# Patient Record
Sex: MALE | Race: WHITE | NOT HISPANIC OR LATINO | Employment: OTHER | URBAN - METROPOLITAN AREA
[De-identification: names, ages, dates, MRNs, and addresses within clinical notes are randomized per-mention and may not be internally consistent; named-entity substitution may affect disease eponyms.]

---

## 2022-09-23 ENCOUNTER — HOSPITAL ENCOUNTER (OUTPATIENT)
Facility: HOSPITAL | Age: 72
Discharge: HOME | End: 2022-09-27
Attending: EMERGENCY MEDICINE | Admitting: INTERNAL MEDICINE
Payer: MEDICARE

## 2022-09-23 DIAGNOSIS — R07.9 CHEST PAIN: ICD-10-CM

## 2022-09-23 DIAGNOSIS — D61.818 PANCYTOPENIA: ICD-10-CM

## 2022-09-23 DIAGNOSIS — R06.02 SOB (SHORTNESS OF BREATH): Primary | ICD-10-CM

## 2022-09-23 DIAGNOSIS — R59.9 ENLARGEMENT OF LYMPH NODE: ICD-10-CM

## 2022-09-23 DIAGNOSIS — D64.9 ANEMIA, UNSPECIFIED TYPE: ICD-10-CM

## 2022-09-23 DIAGNOSIS — R60.9 1+ PITTING EDEMA: ICD-10-CM

## 2022-09-23 DIAGNOSIS — R06.02 SHORTNESS OF BREATH: ICD-10-CM

## 2022-09-23 LAB
ALBUMIN SERPL BCP-MCNC: 2.6 G/DL (ref 3.5–5.2)
ALP SERPL-CCNC: 141 U/L (ref 55–135)
ALT SERPL W/O P-5'-P-CCNC: 56 U/L (ref 10–44)
ANION GAP SERPL CALC-SCNC: 9 MMOL/L (ref 8–16)
ANISOCYTOSIS BLD QL SMEAR: SLIGHT
AST SERPL-CCNC: 43 U/L (ref 10–40)
BASOPHILS # BLD AUTO: 0 K/UL (ref 0–0.2)
BASOPHILS NFR BLD: 0 % (ref 0–1.9)
BILIRUB SERPL-MCNC: 0.8 MG/DL (ref 0.1–1)
BNP SERPL-MCNC: 161 PG/ML (ref 0–99)
BUN SERPL-MCNC: 21 MG/DL (ref 8–23)
CALCIUM SERPL-MCNC: 9.1 MG/DL (ref 8.7–10.5)
CHLORIDE SERPL-SCNC: 100 MMOL/L (ref 95–110)
CO2 SERPL-SCNC: 21 MMOL/L (ref 23–29)
CREAT SERPL-MCNC: 1.1 MG/DL (ref 0.5–1.4)
D DIMER PPP IA.FEU-MCNC: 9.38 MG/L FEU
DIFFERENTIAL METHOD: ABNORMAL
EOSINOPHIL # BLD AUTO: 0 K/UL (ref 0–0.5)
EOSINOPHIL NFR BLD: 0.3 % (ref 0–8)
ERYTHROCYTE [DISTWIDTH] IN BLOOD BY AUTOMATED COUNT: 16.1 % (ref 11.5–14.5)
EST. GFR  (NO RACE VARIABLE): >60 ML/MIN/1.73 M^2
GLUCOSE SERPL-MCNC: 95 MG/DL (ref 70–110)
HCT VFR BLD AUTO: 21.1 % (ref 40–54)
HGB BLD-MCNC: 7.2 G/DL (ref 14–18)
IMM GRANULOCYTES # BLD AUTO: 0.02 K/UL (ref 0–0.04)
IMM GRANULOCYTES NFR BLD AUTO: 0.5 % (ref 0–0.5)
INFLUENZA A, MOLECULAR: NEGATIVE
INFLUENZA B, MOLECULAR: NEGATIVE
LYMPHOCYTES # BLD AUTO: 0.7 K/UL (ref 1–4.8)
LYMPHOCYTES NFR BLD: 16.6 % (ref 18–48)
MCH RBC QN AUTO: 29 PG (ref 27–31)
MCHC RBC AUTO-ENTMCNC: 34.1 G/DL (ref 32–36)
MCV RBC AUTO: 85 FL (ref 82–98)
MONOCYTES # BLD AUTO: 0.2 K/UL (ref 0.3–1)
MONOCYTES NFR BLD: 6.1 % (ref 4–15)
NEUTROPHILS # BLD AUTO: 3 K/UL (ref 1.8–7.7)
NEUTROPHILS NFR BLD: 76.5 % (ref 38–73)
NRBC BLD-RTO: 0 /100 WBC
PLATELET # BLD AUTO: 100 K/UL (ref 150–450)
PLATELET BLD QL SMEAR: ABNORMAL
PMV BLD AUTO: 12.4 FL (ref 9.2–12.9)
POTASSIUM SERPL-SCNC: 3.9 MMOL/L (ref 3.5–5.1)
PROT SERPL-MCNC: 7.6 G/DL (ref 6–8.4)
RBC # BLD AUTO: 2.48 M/UL (ref 4.6–6.2)
SARS-COV-2 RDRP RESP QL NAA+PROBE: NEGATIVE
SODIUM SERPL-SCNC: 130 MMOL/L (ref 136–145)
SPECIMEN SOURCE: NORMAL
TROPONIN I SERPL DL<=0.01 NG/ML-MCNC: 0.04 NG/ML (ref 0–0.03)
WBC # BLD AUTO: 3.91 K/UL (ref 3.9–12.7)

## 2022-09-23 PROCEDURE — 93010 EKG 12-LEAD: ICD-10-PCS | Mod: 76,,, | Performed by: INTERNAL MEDICINE

## 2022-09-23 PROCEDURE — 85379 FIBRIN DEGRADATION QUANT: CPT | Performed by: EMERGENCY MEDICINE

## 2022-09-23 PROCEDURE — 93010 ELECTROCARDIOGRAM REPORT: CPT | Mod: ,,, | Performed by: INTERNAL MEDICINE

## 2022-09-23 PROCEDURE — 25000003 PHARM REV CODE 250: Performed by: NURSE PRACTITIONER

## 2022-09-23 PROCEDURE — 96374 THER/PROPH/DIAG INJ IV PUSH: CPT

## 2022-09-23 PROCEDURE — 83880 ASSAY OF NATRIURETIC PEPTIDE: CPT | Performed by: EMERGENCY MEDICINE

## 2022-09-23 PROCEDURE — 99285 EMERGENCY DEPT VISIT HI MDM: CPT | Mod: 25

## 2022-09-23 PROCEDURE — 84484 ASSAY OF TROPONIN QUANT: CPT | Performed by: EMERGENCY MEDICINE

## 2022-09-23 PROCEDURE — 96372 THER/PROPH/DIAG INJ SC/IM: CPT | Performed by: NURSE PRACTITIONER

## 2022-09-23 PROCEDURE — 99900035 HC TECH TIME PER 15 MIN (STAT)

## 2022-09-23 PROCEDURE — 93005 ELECTROCARDIOGRAM TRACING: CPT

## 2022-09-23 PROCEDURE — 63600175 PHARM REV CODE 636 W HCPCS: Performed by: EMERGENCY MEDICINE

## 2022-09-23 PROCEDURE — 94761 N-INVAS EAR/PLS OXIMETRY MLT: CPT

## 2022-09-23 PROCEDURE — G0378 HOSPITAL OBSERVATION PER HR: HCPCS

## 2022-09-23 PROCEDURE — 80053 COMPREHEN METABOLIC PANEL: CPT | Performed by: EMERGENCY MEDICINE

## 2022-09-23 PROCEDURE — 25500020 PHARM REV CODE 255: Performed by: INTERNAL MEDICINE

## 2022-09-23 PROCEDURE — 87502 INFLUENZA DNA AMP PROBE: CPT | Performed by: EMERGENCY MEDICINE

## 2022-09-23 PROCEDURE — 85025 COMPLETE CBC W/AUTO DIFF WBC: CPT | Performed by: EMERGENCY MEDICINE

## 2022-09-23 PROCEDURE — 25000003 PHARM REV CODE 250: Performed by: EMERGENCY MEDICINE

## 2022-09-23 PROCEDURE — 63600175 PHARM REV CODE 636 W HCPCS: Performed by: NURSE PRACTITIONER

## 2022-09-23 PROCEDURE — U0002 COVID-19 LAB TEST NON-CDC: HCPCS | Performed by: EMERGENCY MEDICINE

## 2022-09-23 RX ORDER — SODIUM CHLORIDE 0.9 % (FLUSH) 0.9 %
10 SYRINGE (ML) INJECTION
Status: DISCONTINUED | OUTPATIENT
Start: 2022-09-23 | End: 2022-09-27 | Stop reason: HOSPADM

## 2022-09-23 RX ORDER — TALC
6 POWDER (GRAM) TOPICAL NIGHTLY PRN
Status: DISCONTINUED | OUTPATIENT
Start: 2022-09-23 | End: 2022-09-27 | Stop reason: HOSPADM

## 2022-09-23 RX ORDER — ENOXAPARIN SODIUM 100 MG/ML
40 INJECTION SUBCUTANEOUS EVERY 24 HOURS
Status: DISCONTINUED | OUTPATIENT
Start: 2022-09-23 | End: 2022-09-27 | Stop reason: HOSPADM

## 2022-09-23 RX ORDER — CARVEDILOL 3.12 MG/1
3.12 TABLET ORAL
Status: COMPLETED | OUTPATIENT
Start: 2022-09-23 | End: 2022-09-23

## 2022-09-23 RX ORDER — ACETAMINOPHEN 325 MG/1
650 TABLET ORAL EVERY 4 HOURS PRN
Status: DISCONTINUED | OUTPATIENT
Start: 2022-09-23 | End: 2022-09-27 | Stop reason: HOSPADM

## 2022-09-23 RX ORDER — FUROSEMIDE 10 MG/ML
40 INJECTION INTRAMUSCULAR; INTRAVENOUS
Status: COMPLETED | OUTPATIENT
Start: 2022-09-23 | End: 2022-09-23

## 2022-09-23 RX ORDER — ONDANSETRON 2 MG/ML
4 INJECTION INTRAMUSCULAR; INTRAVENOUS EVERY 8 HOURS PRN
Status: DISCONTINUED | OUTPATIENT
Start: 2022-09-23 | End: 2022-09-27 | Stop reason: HOSPADM

## 2022-09-23 RX ADMIN — FUROSEMIDE 40 MG: 10 INJECTION, SOLUTION INTRAVENOUS at 02:09

## 2022-09-23 RX ADMIN — ENOXAPARIN SODIUM 40 MG: 100 INJECTION SUBCUTANEOUS at 11:09

## 2022-09-23 RX ADMIN — Medication 6 MG: at 10:09

## 2022-09-23 RX ADMIN — CARVEDILOL 3.12 MG: 3.12 TABLET, FILM COATED ORAL at 02:09

## 2022-09-23 RX ADMIN — ACETAMINOPHEN 650 MG: 325 TABLET, FILM COATED ORAL at 10:09

## 2022-09-23 RX ADMIN — IOHEXOL 100 ML: 350 INJECTION, SOLUTION INTRAVENOUS at 05:09

## 2022-09-23 NOTE — PHARMACY MED REC
"    [facility]           Pharmacy  Admission Medication History     The home medication history was taken by Maranda Hinton.      Medication history obtained from Medications listed below were obtained from: Patient/family PATIENT STATES HE IS NOT TAKING ANY MEDICATIONS    Based on information gathered for medication list, you may go to "Admission" then "Reconcile Home Medications" tabs to review and/or act upon those items.     The home medication list has been updated by the Pharmacy department.   Please read ALL comments highlighted in yellow.   Please address this information as you see fit.    Feel free to contact us if you have any questions or require assistance.      No current facility-administered medications on file prior to encounter.     No current outpatient medications on file prior to encounter.       Please address this information as you see fit.  Feel free to contact us if you have any questions or require assistance.    Maranda Hinton  [phone]              .        "

## 2022-09-23 NOTE — ED NOTES
Patient reports he feels less weak and was able to transfer self to bedside commode and then walked to sink to wash hands and back to bed. Patient informed to please call nurse whenever attempting to transfer due to safety precautions. Patient verbalized understanding. Patient back in bed which is locked and in lowest position. Side rails upx1 per patient request and call light within reach.

## 2022-09-23 NOTE — NURSING
Patient admitted to room 479 from the ED for SOB. Report given to RN charge nurse from [facility] ED RN. Vitals stable, on room air. Safety maintained, bed in lowest position, bed alarm on, bed wheels locked, call light within reach. Will continue to monitor.

## 2022-09-23 NOTE — ED NOTES
Dr. Phillips notified of elevated D-Dimer. Dr. Linares also notified of elevated D-Dimer and PE study ordered by fredo COPELAND.

## 2022-09-23 NOTE — ED NOTES
Called to give report and no answer after call transferred. On hold for 5 minutes. ED charge nurse notified.

## 2022-09-23 NOTE — ED PROVIDER NOTES
"Encounter Date: 9/23/2022    SCRIBE #1 NOTE: I, Olu Green, am scribing for, and in the presence of,  Gerber Rebolledo MD. I have scribed the following portions of the note - Other sections scribed: HPI, ROS, Physical Exam.     History     Chief Complaint   Patient presents with    Shortness of Breath     C/o SOB x few days, decrease in appetite, increased urination, severe dyspnea on exertion, reports hasn't been to doctor in 40 years so unknown medical history, denies chest pain, states "feels like I got feathers in my throat"     This is a 71 y.o. male who has no past medical history on file.     The patient presents to the Emergency Department with worsening shortness of breath.   Pt reports symptoms started "a few days ago".  Symptoms are associated with leg swelling and fatigue.  Pt denies abdominal pain.   No aggravating or relieving factors reported.  Patient reports that he has not been to a doctor in 40 years.      The history is provided by the patient. No  was used.   Review of patient's allergies indicates:  No Known Allergies  No past medical history on file.  No past surgical history on file.  No family history on file.     Review of Systems   Constitutional:  Positive for fatigue. Negative for chills and fever.   HENT:  Negative for sore throat.    Eyes:  Negative for redness.   Respiratory:  Positive for shortness of breath.    Cardiovascular:  Positive for leg swelling. Negative for chest pain.   Gastrointestinal:  Negative for abdominal pain, diarrhea, nausea and vomiting.   Genitourinary:  Negative for dysuria and hematuria.   Musculoskeletal:  Negative for back pain.   Skin:  Negative for rash.   Neurological:  Negative for headaches.     Physical Exam     Initial Vitals [09/23/22 1251]   BP Pulse Resp Temp SpO2   (!) 173/67 (!) 113 (!) 25 99 °F (37.2 °C) 100 %      MAP       --         Physical Exam    Nursing note and vitals reviewed.  Constitutional: He appears " well-developed and well-nourished. He is not diaphoretic. He is Obese . No distress.   HENT:   Head: Normocephalic and atraumatic.   Mouth/Throat: Oropharynx is clear and moist.   Eyes: Conjunctivae are normal.   Neck: No hepatojugular reflux and no JVD present.   Cardiovascular:  An irregular rhythm present.   Tachycardia present.         Pulmonary/Chest: No respiratory distress. He has rales (bilateral bases).   Coarse breath sounds bilateral upper lung fields.   Abdominal: Abdomen is soft. There is no abdominal tenderness.   Musculoskeletal:      Right lower le+ Edema present.      Left lower le+ Edema present.      Comments: Pitting edema bilateral lower extremities up to tibial tuberosity.      Neurological: He is alert and oriented to person, place, and time.   Skin: Skin is warm and dry. Capillary refill takes less than 2 seconds. No rash noted. No pallor.   Psychiatric: He has a normal mood and affect.       ED Course   Procedures  Labs Reviewed   CBC W/ AUTO DIFFERENTIAL - Abnormal; Notable for the following components:       Result Value    RBC 2.48 (*)     Hemoglobin 7.2 (*)     Hematocrit 21.1 (*)     RDW 16.1 (*)     Platelets 100 (*)     Lymph # 0.7 (*)     Mono # 0.2 (*)     Gran % 76.5 (*)     Lymph % 16.6 (*)     Platelet Estimate Decreased (*)     All other components within normal limits   COMPREHENSIVE METABOLIC PANEL - Abnormal; Notable for the following components:    Sodium 130 (*)     CO2 21 (*)     Albumin 2.6 (*)     Alkaline Phosphatase 141 (*)     AST 43 (*)     ALT 56 (*)     All other components within normal limits   TROPONIN I - Abnormal; Notable for the following components:    Troponin I 0.045 (*)     All other components within normal limits   B-TYPE NATRIURETIC PEPTIDE - Abnormal; Notable for the following components:     (*)     All other components within normal limits   D DIMER, QUANTITATIVE - Abnormal; Notable for the following components:    D-Dimer 9.38 (*)      All other components within normal limits   INFLUENZA A & B BY MOLECULAR   SARS-COV-2 RNA AMPLIFICATION, QUAL     EKG Readings: (Independently Interpreted)   Previous EKG Date: N/A.   EKG: Sinus tachycardia with PAC at 109 bpm, left axis deviation, right bundle branch block, no ST-T changes as read by me (Dr. Rebolledo).   No prior EKG available.  Impression: abnormal.   ECG Results              EKG 12-lead (In process)  Result time 09/23/22 15:07:10      In process by Interface, Lab In [facility] (09/23/22 15:07:10)                   Narrative:    Test Reason : R06.02,    Vent. Rate : 101 BPM     Atrial Rate : 101 BPM     P-R Int : 178 ms          QRS Dur : 136 ms      QT Int : 374 ms       P-R-T Axes : 062 -77 035 degrees     QTc Int : 484 ms    Sinus tachycardia with Premature atrial complexes  Left axis deviation  Right bundle branch block  Possible Lateral infarct ,age undetermined  Abnormal ECG  No previous ECGs available    Referred By: [email or web address]   SELF           Confirmed By:                                      EKG 12-lead (In process)  Result time 09/23/22 15:07:24      In process by Interface, Lab In [facility] (09/23/22 15:07:24)                   Narrative:    Test Reason : R06.02,    Vent. Rate : 109 BPM     Atrial Rate : 109 BPM     P-R Int : 166 ms          QRS Dur : 144 ms      QT Int : 388 ms       P-R-T Axes : 062 -81 054 degrees     QTc Int : 522 ms    Sinus tachycardia  Left axis deviation  Right bundle branch block  Abnormal ECG  No previous ECGs available    Referred By: [email or web address]   SELF           Confirmed By:                                   Imaging Results               CTA Chest Non-Coronary (PE Studies) (Final result)  Result time 09/23/22 17:43:53      Final result by Tobin Seymour MD (09/23/22 17:43:53)                   Impression:      1. No evidence of pulmonary embolism  2. Mild nonspecific adenopathy involving the mediastinum, bilateral hilum, bilateral axilla and upper abdomen.   Lymphoma or metastatic disease would be a consideration.  Follow-up recommended.  3. Hepatosplenomegaly.  4.  This report was flagged in Epic as abnormal.      Electronically signed by: Tobin Stevens  Date:    09/23/2022  Time:    17:43               Narrative:    EXAMINATION:  CTA CHEST NON CORONARY (PE STUDIES)    CLINICAL HISTORY:  Pulmonary embolism (PE) suspected, high prob;    TECHNIQUE:  Low dose axial images, sagittal and coronal reformations were obtained from the thoracic inlet to the lung bases following the IV administration of 100 mL of Omnipaque 350.  Contrast timing was optimized to evaluate the pulmonary arteries.  MIP images were performed.    COMPARISON:  None    FINDINGS:  Pulmonary arteries:Pulmonary arteries are adequately enhanced.No filling defects to indicate pulmonary embolism.    Thoracic soft tissues: Unremarkable.    Aorta: Left-sided aortic arch.  No aneurysm or dissection.    Heart: Normal size. No effusion.    Stephanie/Mediastinum: Mild mediastinal adenopathy the paratracheal region, AP window and subcarinal regions.  Mild bilateral hilar adenopathy.    Bilateral mild axillary adenopathy.    Airways: Patent.    Lungs/Pleura: Clear lungs. No pleural effusion or thickening.  Few small scattered emphysematous blebs.    Esophagus: Unremarkable.    Upper Abdomen: The spleen is enlarged measuring greater than 18 cm.  The liver is likely enlarged though incompletely visualized.    Mild upper mesenteric adenopathy    Bones: No acute fracture. No suspicious lytic or sclerotic lesions.                                       X-Ray Chest AP Portable (Final result)  Result time 09/23/22 14:42:34      Final result by Brooks Maddox MD (09/23/22 14:42:34)                   Impression:      No acute chest disease identified.      Electronically signed by: Brooks Maddox MD  Date:    09/23/2022  Time:    14:42               Narrative:    EXAMINATION:  XR CHEST AP PORTABLE    CLINICAL  HISTORY:  CHF;    TECHNIQUE:  Single frontal view of the chest was performed.    COMPARISON:  None    FINDINGS:  The heart is not enlarged.  Atherosclerotic calcification is present within the aortic arch.  Superior mediastinal structures are unremarkable.  Pulmonary vasculature is within normal limits.  The lungs are free of focal consolidations.  There is no evidence for pneumothorax or large pleural effusions.  Bony structures appear intact.                                    X-Rays:   Independently Interpreted Readings:   Chest X-Ray: Normal heart size.  No infiltrates.  No acute abnormalities.   Medications   sodium chloride 0.9% flush 10 mL (has no administration in time range)   acetaminophen tablet 650 mg (650 mg Oral Given 9/24/22 0832)   melatonin tablet 6 mg (6 mg Oral Given 9/23/22 2217)   ondansetron injection 4 mg (has no administration in time range)   enoxaparin injection 40 mg (40 mg Subcutaneous Given 9/23/22 2339)   0.9%  NaCl infusion (for blood administration) (has no administration in time range)   furosemide injection 40 mg (40 mg Intravenous Given 9/23/22 1400)   carvediloL tablet 3.125 mg (3.125 mg Oral Given 9/23/22 1400)   iohexoL (OMNIPAQUE 350) injection 100 mL (100 mLs Intravenous Given 9/23/22 1705)     Medical Decision Making:   Clinical Tests:   Lab Tests: Ordered and Reviewed  Radiological Study: Ordered and Reviewed  Medical Tests: Ordered and Reviewed        Scribe Attestation:   Scribe #1: I performed the above scribed service and the documentation accurately describes the services I performed. I attest to the accuracy of the note.    Attending Attestation:             Attending ED Notes:   Unclear etiology of patient's dyspnea at this time, however patient's overall status is fair, with significant tachypnea.  Patient does not appear comfortable a and I do not believe presents as a stable discharge plan. Other considerations include lung mass/cancer, PE and further workup may be  warranted including advanced imaging of the chest, ECHO. I have discussed with Hospital Medicine for admission and my MDM.      ED Course as of 09/24/22 1131   Fri Sep 23, 2022   1345 I, Dr. Gerber Rebolledo, personally performed the services described in this documentation. All medical record entries made by the scribe were at my direction and in my presence. I have reviewed the chart and agree that the record is accurate and complete.   Gerber Rebolledo MD.   [NP]   1345 This is an emergent evaluation of a 71 y.o.male patient with presentation of shortness of breath.  Says noted elevated blood pressure, tachypnea, tachycardia, irregular heartbeat with PACs, rales on exam, lower extremity edema.     Initial differentials include but are not limited to:  CHF, pneumonia, COPD, pleural effusion, less likely consider PE, pneumothorax.     Plan:  CBC, CMP, BNP, troponin, chest x-ray, EKG, cardiac monitoring, oxygen as needed for comfort, Lasix, carvedilol  [NP]   1429 Influenza A, Molecular: Negative [NP]   1429 Influenza B, Molecular: Negative [NP]   1429 SARS-CoV-2 RNA, Amplification, Qual: Negative [NP]   1432 Sodium(!): 130 [NP]   1432 CO2(!): 21 [NP]   1433 Potassium: 3.9 [NP]   1433 Albumin(!): 2.6 [NP]   1503 Troponin I(!): 0.045 [NP]   1521 Spoke with hospital medicine and they will admit to their service. [GS]      ED Course User Index  [GS] Olu Green  [NP] Gerber Rebolledo MD                 Clinical Impression:   Final diagnoses:  [R06.02] SOB (shortness of breath) (Primary)  [R06.02] Shortness of breath        ED Disposition Condition    Observation                 Gerber Rebolledo MD  09/24/22 1131

## 2022-09-24 PROBLEM — R59.1 LYMPHADENOPATHY: Status: ACTIVE | Noted: 2022-09-24

## 2022-09-24 PROBLEM — R59.9 ENLARGEMENT OF LYMPH NODE: Status: ACTIVE | Noted: 2022-09-24

## 2022-09-24 PROBLEM — D61.818 PANCYTOPENIA: Status: ACTIVE | Noted: 2022-09-24

## 2022-09-24 PROBLEM — D64.9 ANEMIA: Status: ACTIVE | Noted: 2022-09-24

## 2022-09-24 LAB
ABO GROUP BLD: NORMAL
ALBUMIN SERPL BCP-MCNC: 2.4 G/DL (ref 3.5–5.2)
ALP SERPL-CCNC: 120 U/L (ref 55–135)
ALT SERPL W/O P-5'-P-CCNC: 45 U/L (ref 10–44)
ANION GAP SERPL CALC-SCNC: 10 MMOL/L (ref 8–16)
ANISOCYTOSIS BLD QL SMEAR: SLIGHT
AST SERPL-CCNC: 35 U/L (ref 10–40)
BASOPHILS NFR BLD: 0 % (ref 0–1.9)
BILIRUB SERPL-MCNC: 0.6 MG/DL (ref 0.1–1)
BLD GP AB SCN CELLS X3 SERPL QL: NORMAL
BLD PROD TYP BPU: NORMAL
BLOOD UNIT EXPIRATION DATE: NORMAL
BLOOD UNIT TYPE CODE: 8400
BLOOD UNIT TYPE: NORMAL
BUN SERPL-MCNC: 23 MG/DL (ref 8–23)
CALCIUM SERPL-MCNC: 9 MG/DL (ref 8.7–10.5)
CHLORIDE SERPL-SCNC: 102 MMOL/L (ref 95–110)
CO2 SERPL-SCNC: 22 MMOL/L (ref 23–29)
CODING SYSTEM: NORMAL
CREAT SERPL-MCNC: 1.1 MG/DL (ref 0.5–1.4)
DIFFERENTIAL METHOD: ABNORMAL
DISPENSE STATUS: NORMAL
EOSINOPHIL NFR BLD: 0 % (ref 0–8)
ERYTHROCYTE [DISTWIDTH] IN BLOOD BY AUTOMATED COUNT: 16 % (ref 11.5–14.5)
EST. GFR  (NO RACE VARIABLE): >60 ML/MIN/1.73 M^2
ESTIMATED AVG GLUCOSE: 108 MG/DL (ref 68–131)
FERRITIN SERPL-MCNC: 866 NG/ML (ref 20–300)
FOLATE SERPL-MCNC: 12.6 NG/ML (ref 4–24)
GLUCOSE SERPL-MCNC: 115 MG/DL (ref 70–110)
HAPTOGLOB SERPL-MCNC: 61 MG/DL (ref 30–250)
HBA1C MFR BLD: 5.4 % (ref 4–5.6)
HCT VFR BLD AUTO: 19.2 % (ref 40–54)
HGB BLD-MCNC: 6.5 G/DL (ref 14–18)
HGB BLD-MCNC: 6.6 G/DL (ref 14–18)
HYPOCHROMIA BLD QL SMEAR: ABNORMAL
IMM GRANULOCYTES # BLD AUTO: ABNORMAL K/UL (ref 0–0.04)
IMM GRANULOCYTES NFR BLD AUTO: ABNORMAL % (ref 0–0.5)
IRON SERPL-MCNC: 137 UG/DL (ref 45–160)
LYMPHOCYTES NFR BLD: 13 % (ref 18–48)
MAGNESIUM SERPL-MCNC: 2 MG/DL (ref 1.6–2.6)
MCH RBC QN AUTO: 29.3 PG (ref 27–31)
MCHC RBC AUTO-ENTMCNC: 34.4 G/DL (ref 32–36)
MCV RBC AUTO: 85 FL (ref 82–98)
MONOCYTES NFR BLD: 6 % (ref 4–15)
NEUTROPHILS NFR BLD: 80 % (ref 38–73)
NEUTS BAND NFR BLD MANUAL: 1 %
NRBC BLD-RTO: 0 /100 WBC
PLATELET # BLD AUTO: 74 K/UL (ref 150–450)
PLATELET BLD QL SMEAR: ABNORMAL
PMV BLD AUTO: 12.4 FL (ref 9.2–12.9)
POTASSIUM SERPL-SCNC: 3.6 MMOL/L (ref 3.5–5.1)
PROT SERPL-MCNC: 6.9 G/DL (ref 6–8.4)
RBC # BLD AUTO: 2.25 M/UL (ref 4.6–6.2)
RETICS/RBC NFR AUTO: 0.3 % (ref 0.4–2)
RH BLD: NORMAL
SATURATED IRON: 71 % (ref 20–50)
SODIUM SERPL-SCNC: 134 MMOL/L (ref 136–145)
TOTAL IRON BINDING CAPACITY: 192 UG/DL (ref 250–450)
TRANS ERYTHROCYTES VOL PATIENT: NORMAL ML
TRANSFERRIN SERPL-MCNC: 130 MG/DL (ref 200–375)
TROPONIN I SERPL DL<=0.01 NG/ML-MCNC: 0.04 NG/ML (ref 0–0.03)
VIT B12 SERPL-MCNC: 344 PG/ML (ref 210–950)
WBC # BLD AUTO: 2.96 K/UL (ref 3.9–12.7)

## 2022-09-24 PROCEDURE — 86901 BLOOD TYPING SEROLOGIC RH(D): CPT | Performed by: INTERNAL MEDICINE

## 2022-09-24 PROCEDURE — 86870 RBC ANTIBODY IDENTIFICATION: CPT | Performed by: INTERNAL MEDICINE

## 2022-09-24 PROCEDURE — 85007 BL SMEAR W/DIFF WBC COUNT: CPT | Mod: NCS | Performed by: NURSE PRACTITIONER

## 2022-09-24 PROCEDURE — 85018 HEMOGLOBIN: CPT | Mod: 59 | Performed by: INTERNAL MEDICINE

## 2022-09-24 PROCEDURE — 85045 AUTOMATED RETICULOCYTE COUNT: CPT | Performed by: INTERNAL MEDICINE

## 2022-09-24 PROCEDURE — 85027 COMPLETE CBC AUTOMATED: CPT | Performed by: NURSE PRACTITIONER

## 2022-09-24 PROCEDURE — 82728 ASSAY OF FERRITIN: CPT | Performed by: INTERNAL MEDICINE

## 2022-09-24 PROCEDURE — 86920 COMPATIBILITY TEST SPIN: CPT | Mod: 59 | Performed by: INTERNAL MEDICINE

## 2022-09-24 PROCEDURE — 99204 OFFICE O/P NEW MOD 45 MIN: CPT | Mod: ,,, | Performed by: INTERNAL MEDICINE

## 2022-09-24 PROCEDURE — 99220 PR INITIAL OBSERVATION CARE,LEVL III: ICD-10-PCS | Mod: 25,,, | Performed by: INTERNAL MEDICINE

## 2022-09-24 PROCEDURE — G0378 HOSPITAL OBSERVATION PER HR: HCPCS

## 2022-09-24 PROCEDURE — 99900035 HC TECH TIME PER 15 MIN (STAT)

## 2022-09-24 PROCEDURE — 63600175 PHARM REV CODE 636 W HCPCS: Performed by: NURSE PRACTITIONER

## 2022-09-24 PROCEDURE — 99204 PR OFFICE/OUTPT VISIT, NEW, LEVL IV, 45-59 MIN: ICD-10-PCS | Mod: ,,, | Performed by: INTERNAL MEDICINE

## 2022-09-24 PROCEDURE — 86850 RBC ANTIBODY SCREEN: CPT | Performed by: INTERNAL MEDICINE

## 2022-09-24 PROCEDURE — 96372 THER/PROPH/DIAG INJ SC/IM: CPT | Performed by: NURSE PRACTITIONER

## 2022-09-24 PROCEDURE — 86900 BLOOD TYPING SEROLOGIC ABO: CPT | Performed by: INTERNAL MEDICINE

## 2022-09-24 PROCEDURE — 36415 COLL VENOUS BLD VENIPUNCTURE: CPT | Performed by: NURSE PRACTITIONER

## 2022-09-24 PROCEDURE — 36415 COLL VENOUS BLD VENIPUNCTURE: CPT | Performed by: INTERNAL MEDICINE

## 2022-09-24 PROCEDURE — P9021 RED BLOOD CELLS UNIT: HCPCS | Performed by: INTERNAL MEDICINE

## 2022-09-24 PROCEDURE — 84484 ASSAY OF TROPONIN QUANT: CPT | Performed by: INTERNAL MEDICINE

## 2022-09-24 PROCEDURE — 83010 ASSAY OF HAPTOGLOBIN QUANT: CPT | Performed by: INTERNAL MEDICINE

## 2022-09-24 PROCEDURE — 63600175 PHARM REV CODE 636 W HCPCS: Performed by: INTERNAL MEDICINE

## 2022-09-24 PROCEDURE — 82607 VITAMIN B-12: CPT | Performed by: INTERNAL MEDICINE

## 2022-09-24 PROCEDURE — 80053 COMPREHEN METABOLIC PANEL: CPT | Performed by: NURSE PRACTITIONER

## 2022-09-24 PROCEDURE — 36430 TRANSFUSION BLD/BLD COMPNT: CPT

## 2022-09-24 PROCEDURE — 94761 N-INVAS EAR/PLS OXIMETRY MLT: CPT

## 2022-09-24 PROCEDURE — 83735 ASSAY OF MAGNESIUM: CPT | Performed by: NURSE PRACTITIONER

## 2022-09-24 PROCEDURE — 86922 COMPATIBILITY TEST ANTIGLOB: CPT | Performed by: INTERNAL MEDICINE

## 2022-09-24 PROCEDURE — 25000003 PHARM REV CODE 250: Performed by: NURSE PRACTITIONER

## 2022-09-24 PROCEDURE — 82746 ASSAY OF FOLIC ACID SERUM: CPT | Performed by: INTERNAL MEDICINE

## 2022-09-24 PROCEDURE — 84466 ASSAY OF TRANSFERRIN: CPT | Performed by: INTERNAL MEDICINE

## 2022-09-24 PROCEDURE — 99220 PR INITIAL OBSERVATION CARE,LEVL III: CPT | Mod: 25,,, | Performed by: INTERNAL MEDICINE

## 2022-09-24 PROCEDURE — 96376 TX/PRO/DX INJ SAME DRUG ADON: CPT | Mod: 59

## 2022-09-24 PROCEDURE — 83036 HEMOGLOBIN GLYCOSYLATED A1C: CPT | Performed by: NURSE PRACTITIONER

## 2022-09-24 RX ORDER — HYDROCODONE BITARTRATE AND ACETAMINOPHEN 500; 5 MG/1; MG/1
TABLET ORAL
Status: DISCONTINUED | OUTPATIENT
Start: 2022-09-24 | End: 2022-09-26 | Stop reason: SDUPTHER

## 2022-09-24 RX ORDER — FUROSEMIDE 10 MG/ML
20 INJECTION INTRAMUSCULAR; INTRAVENOUS ONCE
Status: COMPLETED | OUTPATIENT
Start: 2022-09-24 | End: 2022-09-24

## 2022-09-24 RX ADMIN — ACETAMINOPHEN 650 MG: 325 TABLET, FILM COATED ORAL at 08:09

## 2022-09-24 RX ADMIN — FUROSEMIDE 20 MG: 10 INJECTION, SOLUTION INTRAMUSCULAR; INTRAVENOUS at 09:09

## 2022-09-24 RX ADMIN — Medication 6 MG: at 08:09

## 2022-09-24 RX ADMIN — ENOXAPARIN SODIUM 40 MG: 100 INJECTION SUBCUTANEOUS at 05:09

## 2022-09-24 NOTE — PLAN OF CARE
VN note: Patient chart, labs, and vitals reviewed. VN to continue to be available as needed.   Problem: Adult Inpatient Plan of Care  Goal: Plan of Care Review  Outcome: Ongoing, Progressing  Goal: Patient-Specific Goal (Individualized)  Outcome: Ongoing, Progressing  Goal: Absence of Hospital-Acquired Illness or Injury  Outcome: Ongoing, Progressing  Goal: Optimal Comfort and Wellbeing  Outcome: Ongoing, Progressing  Goal: Readiness for Transition of Care  Outcome: Ongoing, Progressing

## 2022-09-24 NOTE — SUBJECTIVE & OBJECTIVE
Interval History: Drop in H/H with no obvious source of bleeding. CTA chest showed mild nonspecific adenopathy involving the mediastinum, bilateral hilum, bilateral axilla and upper abdomen concerning for lymphoma or metastatic disease. Pt has noticed recent night sweats x 3 weeks. Denies unintentional weight loss.     Review of Systems   Constitutional:  Positive for fatigue. Negative for unexpected weight change.   HENT:  Negative for congestion.    Eyes:  Negative for visual disturbance.   Respiratory:  Negative for shortness of breath.    Cardiovascular:  Positive for leg swelling. Negative for chest pain and palpitations.   Gastrointestinal:  Negative for vomiting.   Endocrine: Negative for polydipsia and polyuria.   Genitourinary:  Negative for difficulty urinating.   Musculoskeletal:  Negative for arthralgias and gait problem.   Skin:  Negative for color change and rash.   Allergic/Immunologic: Negative for food allergies.   Neurological:  Positive for weakness. Negative for dizziness.   Psychiatric/Behavioral:  Negative for agitation.    Objective:     Vital Signs (Most Recent):  Temp: 98 °F (36.7 °C) (09/24/22 1702)  Pulse: 91 (09/24/22 1702)  Resp: 20 (09/24/22 1702)  BP: (!) 131/58 (09/24/22 1702)  SpO2: 96 % (09/24/22 1702)   Vital Signs (24h Range):  Temp:  [98 °F (36.7 °C)-99 °F (37.2 °C)] 98 °F (36.7 °C)  Pulse:  [78-94] 91  Resp:  [16-20] 20  SpO2:  [94 %-100 %] 96 %  BP: ()/(48-62) 131/58     Weight: 112.5 kg (248 lb)  Body mass index is 32.72 kg/m².  No intake or output data in the 24 hours ending 09/24/22 1705   Physical Exam  HENT:      Head: Atraumatic.      Mouth/Throat:      Mouth: Mucous membranes are moist.   Cardiovascular:      Rate and Rhythm: Normal rate. Rhythm irregular.   Pulmonary:      Breath sounds: Rales present.   Abdominal:      General: Abdomen is flat.   Musculoskeletal:      Right lower leg: Edema present.      Left lower leg: Edema present.      Comments: Pitting edema  bilateral lower extremities up to tibial tuberosity.     Skin:     General: Skin is warm and dry.      Capillary Refill: Capillary refill takes less than 2 seconds.   Neurological:      General: No focal deficit present.      Mental Status: He is alert and oriented to person, place, and time. Mental status is at baseline.   Psychiatric:         Mood and Affect: Mood normal.       Significant Labs: All pertinent labs within the past 24 hours have been reviewed.    Significant Imaging: I have reviewed all pertinent imaging results/findings within the past 24 hours.

## 2022-09-24 NOTE — PROGRESS NOTES
"Subjective:    [facility]  HEMATOLOGY/ONCOLOGY  Progress Note   Patient ID: Author Rafael Lawson is a 71 y.o. male.  REASON FOR CONSULT: Pancytopenia, enlarged lymph nodes  This is a Televisit   Chief Complaint: Shortness of Breath (C/o SOB x few days, decrease in appetite, increased urination, severe dyspnea on exertion, reports hasn't been to doctor in 40 years so unknown medical history, denies chest pain, states "feels like I got feathers in my throat")    HPIHe presented to the ED [facility] with a shortness of breath X 2 months that worsened over the past few days. Associated symptoms include leg swelling and fatigue  EKG revealed sinus tachycardia with no ST-T  CTA chest on 9/23/2022 revealed "No evidence of pulmonary embolism. Mild nonspecific adenopathy involving the mediastinum, bilateral hilum, bilateral axilla and upper abdomen.  Lymphoma or metastatic disease would be a consideration.  Follow-up recommended. Hepatosplenomegaly.    CT abdomen/pelvis today reveals Splenomegaly with prominent retroperitoneal and upper abdominal adenopathy.  Prominent periaortic and bilateral iliac and inguinal lymph nodes visualized.  Findings are concerning for lymphoma.  He notes that he has been been very weak and has not been eating well and has been dizzy with daily fever X 2 months, He has also not been sleeping well. He lives by himself.   He notes that he has not taken care of himself and has smoked all his life, and also drinks.   He keeps sleeping during the visit.  Currently receiving blood transfusion in his hospital room    Review of Systems   Constitutional:  Positive for activity change, appetite change, fatigue and fever.   Respiratory:  Positive for shortness of breath and wheezing.    Gastrointestinal:  Positive for nausea.   Neurological:  Positive for weakness and headaches.   Psychiatric/Behavioral:  Positive for sleep disturbance.        Objective:      Physical Exam  not done as televist "     LABS:  WBC   Date Value Ref Range Status   09/24/2022 2.96 (L) 3.90 - 12.70 K/uL Final     Hemoglobin   Date Value Ref Range Status   09/24/2022 6.5 (L) 14.0 - 18.0 g/dL Final     Hematocrit   Date Value Ref Range Status   09/24/2022 19.2 (LL) 40.0 - 54.0 % Final     Comment:     H&H critical result(s) called and verbal readback obtained from   Oriana Thomas RN by BM6 09/24/2022 10:33       Platelets   Date Value Ref Range Status   09/24/2022 74 (L) 150 - 450 K/uL Final     Gran # (ANC)   Date Value Ref Range Status   09/23/2022 3.0 1.8 - 7.7 K/uL Final     Gran %   Date Value Ref Range Status   09/24/2022 80.0 (H) 38.0 - 73.0 % Corrected     Comment:     CORRECTED RESULT; previously reported as 68.6 on 09/24/2022 at 10:14.     CMP: Albumin 2.4, rest normal   Vitamin B12: 344  Folate: 12.6  Iron   Date Value Ref Range Status   09/24/2022 137 45 - 160 ug/dL Final     Ferritin   Date Value Ref Range Status   09/24/2022 866 (H) 20.0 - 300.0 ng/mL Final      D-Dimer: 9.38  Haptoglobin: 61  Reticulocytes: 0.3    Assessment:       Problem List Items Addressed This Visit       * (Principal) SOB (shortness of breath) - Primary    Relevant Orders    EKG 12-lead (Completed)    Place in Observation (Completed)    Vital signs    Notify Physician    Insert saline lock    Pulse Oximetry Q4H    Full code    Comprehensive metabolic panel (Completed)    Magnesium (Completed)    CBC auto differential (Completed)    Hemoglobin A1c (Completed)     Other Visit Diagnoses       Shortness of breath        Relevant Orders    EKG 12-lead (Completed)    1+ pitting edema        Relevant Orders    Echo              PANCYTOPENIA, GENERALIZED LAD  Plan:         71 year old make presents with weakness, LAD, pancytopenia, fever,   Reviewed with patient that he has general lymphadenopathy noted on CT scans  Symptoms and scansare concerning for lymphoma, reviewed he needs a biopsy (excisional biopsy) of the lymph node for further evaluation. He  does not want any surgeries or procedures done on him.   He understands the reason we are recommending but he does not want to the biopsy  Check LDH, Uric acid as well   Will benefit from palliative consult     Above care plan was discussed with patient and all questions were addressed to their satisfaction

## 2022-09-24 NOTE — H&P
"[facility] Medicine  History & Physical    Patient Name: Author Rafael Lawson  MRN: [id number]  Patient Class: OP- Observation  Admission Date: 9/23/2022  Attending Physician: Lukas Linares MD   Primary Care Provider: No primary care provider on file.         Patient information was obtained from past medical records and ER records.     Subjective:     Principal Problem:SOB (shortness of breath)    Chief Complaint:   Chief Complaint   Patient presents with    Shortness of Breath     C/o SOB x few days, decrease in appetite, increased urination, severe dyspnea on exertion, reports hasn't been to doctor in 40 years so unknown medical history, denies chest pain, states "feels like I got feathers in my throat"        HPI: Author Hall is a 72 yo male with an unremarkable PMHx. He presented to the ED at [facility] with a cc of shortness of breath that worsened over the past few days. Associated symptoms include leg swelling and fatigue. Patient denies chest pain, abdominal pain, nausea, and vomiting.     ED workup revealed /67, , R25, EKG revealed sinus tachycardia with no ST-T changes. Na 130, CO2 21, Albumin 2.6, Troponin 0.045, D Dimer 9.38, , Hgb 7.2, Hct 21.1, Platelets 100, CXR unremarkable, CTA with no evidence of pulmonary embolism. Admitted to hospital medicine for further evaluation and treatment.       No past medical history on file.    No past surgical history on file.    Review of patient's allergies indicates:  No Known Allergies    No current facility-administered medications on file prior to encounter.     No current outpatient medications on file prior to encounter.     Family History    None       Tobacco Use    Smoking status: Not on file    Smokeless tobacco: Not on file   Substance and Sexual Activity    Alcohol use: Not on file    Drug use: Not on file    Sexual activity: Not on file     Review of Systems   Constitutional:  Positive for fatigue. Negative " for unexpected weight change.   HENT:  Negative for congestion.    Eyes:  Negative for visual disturbance.   Respiratory:  Positive for shortness of breath.    Cardiovascular:  Positive for leg swelling. Negative for chest pain and palpitations.   Gastrointestinal:  Negative for vomiting.   Endocrine: Negative for polydipsia and polyuria.   Genitourinary:  Negative for difficulty urinating.   Musculoskeletal:  Negative for arthralgias and gait problem.   Skin:  Negative for color change and rash.   Allergic/Immunologic: Negative for food allergies.   Neurological:  Positive for weakness. Negative for dizziness.   Psychiatric/Behavioral:  Negative for agitation.    Objective:     Vital Signs (Most Recent):  Temp: 98.2 °F (36.8 °C) (09/23/22 2032)  Pulse: 82 (09/23/22 2032)  Resp: 16 (09/23/22 2032)  BP: (!) 101/48 (09/23/22 2032)  SpO2: 96 % (09/23/22 2032)   Vital Signs (24h Range):  Temp:  [98 °F (36.7 °C)-99 °F (37.2 °C)] 98.2 °F (36.8 °C)  Pulse:  [] 82  Resp:  [16-31] 16  SpO2:  [94 %-100 %] 96 %  BP: ()/(48-74) 101/48     Weight: 112.5 kg (248 lb 0.3 oz)  Body mass index is 32.72 kg/m².    Physical Exam  HENT:      Head: Atraumatic.      Mouth/Throat:      Mouth: Mucous membranes are moist.   Cardiovascular:      Rate and Rhythm: Normal rate. Rhythm irregular.   Pulmonary:      Breath sounds: Rales present.   Abdominal:      General: Abdomen is flat.   Musculoskeletal:      Right lower leg: Edema present.      Left lower leg: Edema present.      Comments: Pitting edema bilateral lower extremities up to tibial tuberosity.     Skin:     General: Skin is warm and dry.      Capillary Refill: Capillary refill takes less than 2 seconds.   Neurological:      General: No focal deficit present.      Mental Status: He is alert and oriented to person, place, and time. Mental status is at baseline.   Psychiatric:         Mood and Affect: Mood normal.           Significant Labs: All pertinent labs within the  past 24 hours have been reviewed.    Significant Imaging: I have reviewed all pertinent imaging results/findings within the past 24 hours.    Assessment/Plan:     * SOB (shortness of breath)  He presented toED cc of shortness of breath, leg swelling and fatigue  EKG, sinus tachy  Albumin 2.6, Troponin 0.045, D Dimer 9.38,   CXR unremarkable, CTA with no evidence of pulmonary embolism  TTE pending  Cardiology consulted       VTE Risk Mitigation (From admission, onward)         Ordered     enoxaparin injection 40 mg  Daily         09/23/22 5445                   Renetta Caruso, NP  Department of [facility] Medicine   [facility]

## 2022-09-24 NOTE — HPI
Author Rafael is a 72 yo male with an unremarkable PMHx. He presented to the ED at [facility] with a cc of shortness of breath that worsened over the past few days. Associated symptoms include leg swelling and fatigue. Patient denies chest pain, abdominal pain, nausea, and vomiting.     ED workup revealed /67, , R25, EKG revealed sinus tachycardia with no ST-T changes. Na 130, CO2 21, Albumin 2.6, Troponin 0.045, D Dimer 9.38, , Hgb 7.2, Hct 21.1, Platelets 100, CXR unremarkable, CTA with no evidence of pulmonary embolism. Admitted to hospital medicine for further evaluation and treatment.

## 2022-09-24 NOTE — PROGRESS NOTES
[facility] Medicine  Progress Note    Patient Name: Author Rafael Lawson  MRN: [id number]  Patient Class: OP- Observation   Admission Date: 9/23/2022  Length of Stay: 0 days  Attending Physician: Lukas Linares MD  Primary Care Provider: No primary care provider on file.        Subjective:     Principal Problem:SOB (shortness of breath)        HPI:  Author Hall is a 70 yo male with an unremarkable PMHx. He presented to the ED at [facility] with a cc of shortness of breath that worsened over the past few days. Associated symptoms include leg swelling and fatigue. Patient denies chest pain, abdominal pain, nausea, and vomiting.     ED workup revealed /67, , R25, EKG revealed sinus tachycardia with no ST-T changes. Na 130, CO2 21, Albumin 2.6, Troponin 0.045, D Dimer 9.38, , Hgb 7.2, Hct 21.1, Platelets 100, CXR unremarkable, CTA with no evidence of pulmonary embolism. Admitted to hospital medicine for further evaluation and treatment.       Overview/Hospital Course:  No notes on file    Interval History: Drop in H/H with no obvious source of bleeding. CTA chest showed mild nonspecific adenopathy involving the mediastinum, bilateral hilum, bilateral axilla and upper abdomen concerning for lymphoma or metastatic disease. Pt has noticed recent night sweats x 3 weeks. Denies unintentional weight loss.     Review of Systems   Constitutional:  Positive for fatigue. Negative for unexpected weight change.   HENT:  Negative for congestion.    Eyes:  Negative for visual disturbance.   Respiratory:  Negative for shortness of breath.    Cardiovascular:  Positive for leg swelling. Negative for chest pain and palpitations.   Gastrointestinal:  Negative for vomiting.   Endocrine: Negative for polydipsia and polyuria.   Genitourinary:  Negative for difficulty urinating.   Musculoskeletal:  Negative for arthralgias and gait problem.   Skin:  Negative for color change and rash.    Allergic/Immunologic: Negative for food allergies.   Neurological:  Positive for weakness. Negative for dizziness.   Psychiatric/Behavioral:  Negative for agitation.    Objective:     Vital Signs (Most Recent):  Temp: 98 °F (36.7 °C) (09/24/22 1702)  Pulse: 91 (09/24/22 1702)  Resp: 20 (09/24/22 1702)  BP: (!) 131/58 (09/24/22 1702)  SpO2: 96 % (09/24/22 1702)   Vital Signs (24h Range):  Temp:  [98 °F (36.7 °C)-99 °F (37.2 °C)] 98 °F (36.7 °C)  Pulse:  [78-94] 91  Resp:  [16-20] 20  SpO2:  [94 %-100 %] 96 %  BP: ()/(48-62) 131/58     Weight: 112.5 kg (248 lb)  Body mass index is 32.72 kg/m².  No intake or output data in the 24 hours ending 09/24/22 1705   Physical Exam  HENT:      Head: Atraumatic.      Mouth/Throat:      Mouth: Mucous membranes are moist.   Cardiovascular:      Rate and Rhythm: Normal rate. Rhythm irregular.   Pulmonary:      Breath sounds: Rales present.   Abdominal:      General: Abdomen is flat.   Musculoskeletal:      Right lower leg: Edema present.      Left lower leg: Edema present.      Comments: Pitting edema bilateral lower extremities up to tibial tuberosity.     Skin:     General: Skin is warm and dry.      Capillary Refill: Capillary refill takes less than 2 seconds.   Neurological:      General: No focal deficit present.      Mental Status: He is alert and oriented to person, place, and time. Mental status is at baseline.   Psychiatric:         Mood and Affect: Mood normal.       Significant Labs: All pertinent labs within the past 24 hours have been reviewed.    Significant Imaging: I have reviewed all pertinent imaging results/findings within the past 24 hours.      Assessment/Plan:      * SOB (shortness of breath)  He presented to ED cc of shortness of breath, leg swelling and fatigue  EKG, sinus tachy  Albumin 2.6, Troponin 0.045, D Dimer 9.38,   CXR unremarkable, CTA with no evidence of pulmonary embolism  Cardiology consulted   Etiology possibly CHF vs symptomatic  anemia  TTE pending  Give dose of lasix  Transfuse prbc    Anemia  Symptomatic anemia  Transfuse 1u pRBC  Trend H/H  Anemia workup      Lymphadenopathy  Diffuse lymphadenopathy on imaging, concerning for lymphoma  Heme/Onc consult        VTE Risk Mitigation (From admission, onward)         Ordered     enoxaparin injection 40 mg  Daily         09/23/22 2231                Discharge Planning   ANUSHA:      Code Status: Full Code   Is the patient medically ready for discharge?:     Reason for patient still in hospital (select all that apply): Patient trending condition, Treatment and Consult recommendations                     Lukas Linares MD  Department of [facility] Medicine   [facility]

## 2022-09-24 NOTE — ASSESSMENT & PLAN NOTE
He presented toED cc of shortness of breath, leg swelling and fatigue  EKG, sinus tachy  Albumin 2.6, Troponin 0.045, D Dimer 9.38,   CXR unremarkable, CTA with no evidence of pulmonary embolism  TTE pending  Cardiology consulted

## 2022-09-24 NOTE — SUBJECTIVE & OBJECTIVE
No past medical history on file.    No past surgical history on file.    Review of patient's allergies indicates:  No Known Allergies    No current facility-administered medications on file prior to encounter.     No current outpatient medications on file prior to encounter.     Family History    None       Tobacco Use    Smoking status: Not on file    Smokeless tobacco: Not on file   Substance and Sexual Activity    Alcohol use: Not on file    Drug use: Not on file    Sexual activity: Not on file     Review of Systems   Constitutional:  Positive for fatigue. Negative for unexpected weight change.   HENT:  Negative for congestion.    Eyes:  Negative for visual disturbance.   Respiratory:  Positive for shortness of breath.    Cardiovascular:  Positive for leg swelling. Negative for chest pain and palpitations.   Gastrointestinal:  Negative for vomiting.   Endocrine: Negative for polydipsia and polyuria.   Genitourinary:  Negative for difficulty urinating.   Musculoskeletal:  Negative for arthralgias and gait problem.   Skin:  Negative for color change and rash.   Allergic/Immunologic: Negative for food allergies.   Neurological:  Positive for weakness. Negative for dizziness.   Psychiatric/Behavioral:  Negative for agitation.    Objective:     Vital Signs (Most Recent):  Temp: 98.2 °F (36.8 °C) (09/23/22 2032)  Pulse: 82 (09/23/22 2032)  Resp: 16 (09/23/22 2032)  BP: (!) 101/48 (09/23/22 2032)  SpO2: 96 % (09/23/22 2032)   Vital Signs (24h Range):  Temp:  [98 °F (36.7 °C)-99 °F (37.2 °C)] 98.2 °F (36.8 °C)  Pulse:  [] 82  Resp:  [16-31] 16  SpO2:  [94 %-100 %] 96 %  BP: ()/(48-74) 101/48     Weight: 112.5 kg (248 lb 0.3 oz)  Body mass index is 32.72 kg/m².    Physical Exam  HENT:      Head: Atraumatic.      Mouth/Throat:      Mouth: Mucous membranes are moist.   Cardiovascular:      Rate and Rhythm: Normal rate. Rhythm irregular.   Pulmonary:      Breath sounds: Rales present.   Abdominal:      General:  Abdomen is flat.   Musculoskeletal:      Right lower leg: Edema present.      Left lower leg: Edema present.      Comments: Pitting edema bilateral lower extremities up to tibial tuberosity.     Skin:     General: Skin is warm and dry.      Capillary Refill: Capillary refill takes less than 2 seconds.   Neurological:      General: No focal deficit present.      Mental Status: He is alert and oriented to person, place, and time. Mental status is at baseline.   Psychiatric:         Mood and Affect: Mood normal.           Significant Labs: All pertinent labs within the past 24 hours have been reviewed.    Significant Imaging: I have reviewed all pertinent imaging results/findings within the past 24 hours.

## 2022-09-24 NOTE — CONSULTS
[facility] - Telemetry  Cardiology  Consult Note    Patient Name: Author Rafael Lawson  MRN: [id number]  Admission Date: 9/23/2022  Hospital Length of Stay: 0 days  Code Status: Full Code   Attending Provider: Lukas Linares MD   Consulting Provider: Serena Skaggs MD  Primary Care Physician: No primary care provider on file.  Principal Problem:SOB (shortness of breath)    Patient information was obtained from patient and ER records.     [facility]  Consult performed by: Serena Skaggs MD  Consult ordered by: Renetta Caruso NP      Subjective:     Chief Complaint:  SOB     HPI  70 yo male with no known cardiac history (reports last seen by a physician 4 years back)  Presented for worsening SOB for which cardiology has been consulted    States SOB started suddenly about 2 weeks back. Endorses associated LE swelling, fatigue. Denies any h/o CP.    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    Troponin 0.045 (one reading available).      No past medical history on file.    No past surgical history on file.    Review of patient's allergies indicates:  No Known Allergies    No current facility-administered medications on file prior to encounter.     No current outpatient medications on file prior to encounter.     Family History    None       Tobacco Use    Smoking status: Not on file    Smokeless tobacco: Not on file   Substance and Sexual Activity    Alcohol use: Not on file    Drug use: Not on file    Sexual activity: Not on file     Review of Systems   Constitutional: Negative for fever.   HENT:  Negative for nosebleeds.    Cardiovascular:  Negative for chest pain.        As above   Respiratory:  Negative for hemoptysis.    Hematologic/Lymphatic: Negative for bleeding problem.   Skin:  Negative for poor wound healing.   Gastrointestinal:  Negative for hematochezia.   Genitourinary:  Negative for hematuria.   Allergic/Immunologic: Negative for persistent infections.   Objective:      Vital Signs (Most Recent):  Temp: 99 °F (37.2 °C) (09/24/22 0838)  Pulse: 94 (09/24/22 0838)  Resp: 18 (09/24/22 0838)  BP: (!) 144/55 (09/24/22 0838)  SpO2: 99 % (09/24/22 0846)   Vital Signs (24h Range):  Temp:  [98 °F (36.7 °C)-99 °F (37.2 °C)] 99 °F (37.2 °C)  Pulse:  [] 94  Resp:  [16-31] 18  SpO2:  [94 %-100 %] 99 %  BP: ()/(48-74) 144/55     Weight: 112.5 kg (248 lb 0.3 oz)  Body mass index is 32.72 kg/m².    SpO2: 99 %  O2 Device (Oxygen Therapy): room air      Intake/Output Summary (Last 24 hours) at 9/24/2022 1046  Last data filed at 9/23/2022 1700  Gross per 24 hour   Intake --   Output 1050 ml   Net -1050 ml       Lines/Drains/Airways       Peripheral Intravenous Line  Duration                  Peripheral IV - Single Lumen 09/23/22 1350 20 G Left Antecubital <1 day                    Physical Exam  Constitutional:       General: He is not in acute distress.     Appearance: He is well-developed. He is not diaphoretic.   HENT:      Head: Normocephalic.   Neck:      Vascular: No JVD.   Cardiovascular:      Rate and Rhythm: Normal rate and regular rhythm.      Heart sounds: No murmur heard.    No friction rub. No gallop.   Pulmonary:      Effort: Pulmonary effort is normal. No respiratory distress.      Breath sounds: Normal breath sounds.   Abdominal:      Palpations: Abdomen is soft.      Tenderness: There is no abdominal tenderness.   Musculoskeletal:         General: Swelling present.      Cervical back: Normal range of motion.   Skin:     General: Skin is warm.   Neurological:      Mental Status: He is alert.   Psychiatric:         Mood and Affect: Mood normal.       Significant Labs: CMP   Recent Labs   Lab 09/23/22  1402 09/24/22  0158   * 134*   K 3.9 3.6    102   CO2 21* 22*   GLU 95 115*   BUN 21 23   CREATININE 1.1 1.1   CALCIUM 9.1 9.0   PROT 7.6 6.9   ALBUMIN 2.6* 2.4*   BILITOT 0.8 0.6   ALKPHOS 141* 120   AST 43* 35   ALT 56* 45*   ANIONGAP 9 10   , CBC   Recent  "Labs   Lab 09/23/22  1402 09/24/22  0158   WBC 3.91 2.96*   HGB 7.2* 6.6*   HCT 21.1* 19.2*   * 74*   , and Troponin   Recent Labs   Lab 09/23/22  1402   TROPONINI 0.045*     CTA negative for PE    EKG personally reviewed. My interpretation  9/23/22: Stac 100s, PACs. LAD. RBBB. PRWP. Qtc 484    Assessment and Plan:     SOB  - Unclear if he had a possible MI 2 weeks back  - Echocardiogram  - Trend troponin  - Monitor I/Os, daily weights  - Anemic- unclear etiology. Possibly contributing to his SOB  - Based on echocardiogram results, consider furosemide trial    Check for RFs (DM, lipid profile)    CTA Chest noted "Mild nonspecific adenopathy involving the mediastinum, bilateral hilum, bilateral axilla and upper abdomen.  Lymphoma or metastatic disease would be a consideration.  Follow-up recommended."      Active Diagnoses:    Diagnosis Date Noted POA    PRINCIPAL PROBLEM:  SOB (shortness of breath) [R06.02] 09/23/2022 Yes      Problems Resolved During this Admission:       VTE Risk Mitigation (From admission, onward)           Ordered     enoxaparin injection 40 mg  Daily         09/23/22 6124                    Thank you for your consult. I will follow-up with patient. Please contact us if you have any additional questions.    Serena Skaggs MD  Cardiology   [facility] - Telemetry        "

## 2022-09-24 NOTE — ASSESSMENT & PLAN NOTE
He presented to ED cc of shortness of breath, leg swelling and fatigue  EKG, sinus tachy  Albumin 2.6, Troponin 0.045, D Dimer 9.38,   CXR unremarkable, CTA with no evidence of pulmonary embolism  Cardiology consulted   Etiology possibly CHF vs symptomatic anemia  TTE pending  Give dose of lasix  Transfuse prbc

## 2022-09-24 NOTE — PLAN OF CARE
Problem: Adult Inpatient Plan of Care  Goal: Plan of Care Review  Outcome: Ongoing, Progressing  Goal: Patient-Specific Goal (Individualized)  Outcome: Ongoing, Progressing  Goal: Absence of Hospital-Acquired Illness or Injury  Outcome: Ongoing, Progressing  Goal: Optimal Comfort and Wellbeing  Outcome: Ongoing, Progressing  Goal: Readiness for Transition of Care  Outcome: Ongoing, Progressing     Problem: Fall Injury Risk  Goal: Absence of Fall and Fall-Related Injury  Outcome: Ongoing, Progressing     Problem: Fatigue  Goal: Improved Activity Tolerance  Outcome: Ongoing, Progressing     Problem: Breathing Pattern Ineffective  Goal: Effective Breathing Pattern  Outcome: Ongoing, Progressing     Problem: Gas Exchange Impaired  Goal: Optimal Gas Exchange  Outcome: Ongoing, Progressing

## 2022-09-24 NOTE — PLAN OF CARE
VN cued into room to complete admit assessment. VIP model introduced; VN working alongside bedside treatment team.  Plan of care reviewed with patient. Patient informed of fall risk, fall precautions, call light within reach, side rails x2 elevated. Patient notified to ask staff for assistance. Patient verbalized complete understanding. Time allowed for questions. Will continue to monitor and intervene as needed. Chart reviewed.

## 2022-09-25 LAB
ANISOCYTOSIS BLD QL SMEAR: SLIGHT
AORTIC ROOT ANNULUS: 3.53 CM
AV INDEX (PROSTH): 0.45
AV MEAN GRADIENT: 15 MMHG
AV PEAK GRADIENT: 25 MMHG
AV VALVE AREA: 2.05 CM2
AV VELOCITY RATIO: 0.51
BASOPHILS NFR BLD: 0 % (ref 0–1.9)
BLD PROD TYP BPU: NORMAL
BLOOD UNIT EXPIRATION DATE: NORMAL
BLOOD UNIT TYPE CODE: 6200
BLOOD UNIT TYPE: NORMAL
BSA FOR ECHO PROCEDURE: 2.41 M2
CODING SYSTEM: NORMAL
CV ECHO LV RWT: 0.5 CM
DIFFERENTIAL METHOD: ABNORMAL
DISPENSE STATUS: NORMAL
DOP CALC AO PEAK VEL: 2.51 M/S
DOP CALC AO VTI: 60 CM
DOP CALC LVOT AREA: 4.6 CM2
DOP CALC LVOT DIAMETER: 2.41 CM
DOP CALC LVOT PEAK VEL: 1.28 M/S
DOP CALC LVOT STROKE VOLUME: 123.1 CM3
DOP CALC MV VTI: 29.2 CM
DOP CALCLVOT PEAK VEL VTI: 27 CM
E WAVE DECELERATION TIME: 151.55 MSEC
E/A RATIO: 1.18
ECHO LV POSTERIOR WALL: 1.27 CM (ref 0.6–1.1)
EJECTION FRACTION: 60 %
EOSINOPHIL NFR BLD: 0 % (ref 0–8)
ERYTHROCYTE [DISTWIDTH] IN BLOOD BY AUTOMATED COUNT: 15.8 % (ref 11.5–14.5)
FRACTIONAL SHORTENING: 37 % (ref 28–44)
HCT VFR BLD AUTO: 20.1 % (ref 40–54)
HGB BLD-MCNC: 6.7 G/DL (ref 14–18)
HGB BLD-MCNC: 7.1 G/DL (ref 14–18)
HYPOCHROMIA BLD QL SMEAR: ABNORMAL
IMM GRANULOCYTES # BLD AUTO: ABNORMAL K/UL (ref 0–0.04)
IMM GRANULOCYTES NFR BLD AUTO: ABNORMAL % (ref 0–0.5)
INTERVENTRICULAR SEPTUM: 1.59 CM (ref 0.6–1.1)
IVC DIAMETER: 2.78 CM
IVRT: 88.49 MSEC
LA MAJOR: 5.99 CM
LA MINOR: 5.28 CM
LDH SERPL L TO P-CCNC: 260 U/L (ref 110–260)
LEFT ATRIUM SIZE: 3.92 CM
LEFT ATRIUM VOLUME INDEX MOD: 36 ML/M2
LEFT ATRIUM VOLUME MOD: 85.05 CM3
LEFT INTERNAL DIMENSION IN SYSTOLE: 3.2 CM (ref 2.1–4)
LEFT VENTRICLE DIASTOLIC VOLUME INDEX: 52.73 ML/M2
LEFT VENTRICLE DIASTOLIC VOLUME: 124.45 ML
LEFT VENTRICLE MASS INDEX: 132 G/M2
LEFT VENTRICLE SYSTOLIC VOLUME INDEX: 17.4 ML/M2
LEFT VENTRICLE SYSTOLIC VOLUME: 40.97 ML
LEFT VENTRICULAR INTERNAL DIMENSION IN DIASTOLE: 5.11 CM (ref 3.5–6)
LEFT VENTRICULAR MASS: 310.79 G
LVOT MG: 4.23 MMHG
LVOT MV: 0.99 CM/S
LYMPHOCYTES NFR BLD: 15 % (ref 18–48)
MCH RBC QN AUTO: 28.6 PG (ref 27–31)
MCHC RBC AUTO-ENTMCNC: 33.3 G/DL (ref 32–36)
MCV RBC AUTO: 86 FL (ref 82–98)
MONOCYTES NFR BLD: 8 % (ref 4–15)
MV A" WAVE DURATION": 145.58 MSEC
MV MEAN GRADIENT: 3 MMHG
MV PEAK A VEL: 1.05 M/S
MV PEAK E VEL: 1.24 M/S
MV PEAK GRADIENT: 5 MMHG
MV VALVE AREA BY CONTINUITY EQUATION: 4.22 CM2
NEUTROPHILS NFR BLD: 77 % (ref 38–73)
NRBC BLD-RTO: 0 /100 WBC
OB PNL STL: NEGATIVE
PISA TR MAX VEL: 2.03 M/S
PLATELET # BLD AUTO: 117 K/UL (ref 150–450)
PLATELET BLD QL SMEAR: ABNORMAL
PMV BLD AUTO: 11.2 FL (ref 9.2–12.9)
PULM VEIN S/D RATIO: 0.82
PV PEAK D VEL: 0.84 M/S
PV PEAK S VEL: 0.69 M/S
RA MAJOR: 5.5 CM
RA PRESSURE: 8 MMHG
RBC # BLD AUTO: 2.34 M/UL (ref 4.6–6.2)
RIGHT VENTRICULAR END-DIASTOLIC DIMENSION: 2.63 CM
TR MAX PG: 16 MMHG
TRANS ERYTHROCYTES VOL PATIENT: NORMAL ML
TV REST PULMONARY ARTERY PRESSURE: 24 MMHG
URATE SERPL-MCNC: 6.8 MG/DL (ref 3.4–7)
WBC # BLD AUTO: 3.25 K/UL (ref 3.9–12.7)

## 2022-09-25 PROCEDURE — 83615 LACTATE (LD) (LDH) ENZYME: CPT | Performed by: INTERNAL MEDICINE

## 2022-09-25 PROCEDURE — 96372 THER/PROPH/DIAG INJ SC/IM: CPT | Performed by: NURSE PRACTITIONER

## 2022-09-25 PROCEDURE — 85027 COMPLETE CBC AUTOMATED: CPT | Performed by: INTERNAL MEDICINE

## 2022-09-25 PROCEDURE — 85018 HEMOGLOBIN: CPT | Performed by: INTERNAL MEDICINE

## 2022-09-25 PROCEDURE — 36415 COLL VENOUS BLD VENIPUNCTURE: CPT | Performed by: INTERNAL MEDICINE

## 2022-09-25 PROCEDURE — 82272 OCCULT BLD FECES 1-3 TESTS: CPT | Performed by: INTERNAL MEDICINE

## 2022-09-25 PROCEDURE — P9021 RED BLOOD CELLS UNIT: HCPCS | Performed by: INTERNAL MEDICINE

## 2022-09-25 PROCEDURE — 94761 N-INVAS EAR/PLS OXIMETRY MLT: CPT

## 2022-09-25 PROCEDURE — 99900035 HC TECH TIME PER 15 MIN (STAT)

## 2022-09-25 PROCEDURE — 85007 BL SMEAR W/DIFF WBC COUNT: CPT | Mod: NCS | Performed by: INTERNAL MEDICINE

## 2022-09-25 PROCEDURE — 25000003 PHARM REV CODE 250: Performed by: NURSE PRACTITIONER

## 2022-09-25 PROCEDURE — G0378 HOSPITAL OBSERVATION PER HR: HCPCS

## 2022-09-25 PROCEDURE — 84550 ASSAY OF BLOOD/URIC ACID: CPT | Performed by: INTERNAL MEDICINE

## 2022-09-25 PROCEDURE — 63600175 PHARM REV CODE 636 W HCPCS: Performed by: NURSE PRACTITIONER

## 2022-09-25 PROCEDURE — 94760 N-INVAS EAR/PLS OXIMETRY 1: CPT

## 2022-09-25 PROCEDURE — 36430 TRANSFUSION BLD/BLD COMPNT: CPT

## 2022-09-25 RX ORDER — HYDROCODONE BITARTRATE AND ACETAMINOPHEN 500; 5 MG/1; MG/1
TABLET ORAL
Status: DISCONTINUED | OUTPATIENT
Start: 2022-09-25 | End: 2022-09-26 | Stop reason: SDUPTHER

## 2022-09-25 RX ADMIN — ACETAMINOPHEN 650 MG: 325 TABLET, FILM COATED ORAL at 11:09

## 2022-09-25 RX ADMIN — Medication 6 MG: at 09:09

## 2022-09-25 RX ADMIN — ENOXAPARIN SODIUM 40 MG: 100 INJECTION SUBCUTANEOUS at 05:09

## 2022-09-25 NOTE — PLAN OF CARE
Patient on room air, no distress noted, and will continue to monitor.    Split-Thickness Skin Graft Text: The defect edges were debeveled with a #15 scalpel blade.  Given the location of the defect, shape of the defect and the proximity to free margins a split thickness skin graft was deemed most appropriate.  Using a sterile surgical marker, the primary defect shape was transferred to the donor site. The split thickness graft was then harvested.  The skin graft was then placed in the primary defect and oriented appropriately.

## 2022-09-25 NOTE — PLAN OF CARE
Problem: Adult Inpatient Plan of Care  Goal: Plan of Care Review  Outcome: Ongoing, Progressing  Goal: Patient-Specific Goal (Individualized)  Outcome: Ongoing, Progressing  Goal: Absence of Hospital-Acquired Illness or Injury  Outcome: Ongoing, Progressing  Goal: Optimal Comfort and Wellbeing  Outcome: Ongoing, Progressing  Goal: Readiness for Transition of Care  Outcome: Ongoing, Progressing     Problem: Fall Injury Risk  Goal: Absence of Fall and Fall-Related Injury  Outcome: Ongoing, Progressing     Problem: Fatigue  Goal: Improved Activity Tolerance  Outcome: Ongoing, Progressing     Problem: Breathing Pattern Ineffective  Goal: Effective Breathing Pattern  Outcome: Ongoing, Progressing

## 2022-09-25 NOTE — ASSESSMENT & PLAN NOTE
Diffuse lymphadenopathy on imaging, concerning for lymphoma  Heme/Onc consulted  Will require excisional biopsy. Pt unsure if he is willing to have biopsy performed despite extensive discussion regarding risks/benefits.

## 2022-09-25 NOTE — ASSESSMENT & PLAN NOTE
He presented to ED cc of shortness of breath, leg swelling and fatigue  EKG, sinus tachy  Albumin 2.6, Troponin 0.045, D Dimer 9.38,   CXR unremarkable, CTA with no evidence of pulmonary embolism  Cardiology consulted   Etiology likely symptomatic anemia  TTE with normal function  Transfuse prbc

## 2022-09-25 NOTE — SUBJECTIVE & OBJECTIVE
Interval History: No acute events. Hb <7 despite transfusion yesterday. No signs of bleeding. CT chest/abd/pelvis showing diffuse LAD concerning for lymphoma. Pt not sure if he's willing to have biopsy done. States SOB has improved but still gets fatigued easily.    Review of Systems   Constitutional:  Positive for fatigue. Negative for unexpected weight change.   HENT:  Negative for congestion.    Eyes:  Negative for visual disturbance.   Respiratory:  Negative for shortness of breath.    Cardiovascular:  Positive for leg swelling. Negative for chest pain and palpitations.   Gastrointestinal:  Negative for vomiting.   Endocrine: Negative for polydipsia and polyuria.   Genitourinary:  Negative for difficulty urinating.   Musculoskeletal:  Negative for arthralgias and gait problem.   Skin:  Negative for color change and rash.   Allergic/Immunologic: Negative for food allergies.   Neurological:  Positive for weakness. Negative for dizziness.   Psychiatric/Behavioral:  Negative for agitation.    Objective:     Vital Signs (Most Recent):  Temp: 96.7 °F (35.9 °C) (09/25/22 1233)  Pulse: 77 (09/25/22 1233)  Resp: 18 (09/25/22 1233)  BP: (!) 106/52 (09/25/22 1233)  SpO2: 99 % (09/25/22 1233)   Vital Signs (24h Range):  Temp:  [96.7 °F (35.9 °C)-98.3 °F (36.8 °C)] 96.7 °F (35.9 °C)  Pulse:  [74-91] 77  Resp:  [18-20] 18  SpO2:  [94 %-100 %] 99 %  BP: (106-136)/(52-63) 106/52     Weight: 112.6 kg (248 lb 3.8 oz)  Body mass index is 32.75 kg/m².    Intake/Output Summary (Last 24 hours) at 9/25/2022 1317  Last data filed at 9/25/2022 0549  Gross per 24 hour   Intake 547.5 ml   Output 600 ml   Net -52.5 ml        Physical Exam  HENT:      Head: Atraumatic.      Mouth/Throat:      Mouth: Mucous membranes are moist.   Cardiovascular:      Rate and Rhythm: Normal rate. Rhythm irregular.   Pulmonary:      Breath sounds: Rales present.   Abdominal:      General: Abdomen is flat.   Musculoskeletal:      Right lower leg: Edema  present.      Left lower leg: Edema present.      Comments: Pitting edema bilateral lower extremities up to tibial tuberosity.     Skin:     General: Skin is warm and dry.      Capillary Refill: Capillary refill takes less than 2 seconds.   Neurological:      General: No focal deficit present.      Mental Status: He is alert and oriented to person, place, and time. Mental status is at baseline.   Psychiatric:         Mood and Affect: Mood normal.       Significant Labs: All pertinent labs within the past 24 hours have been reviewed.    Significant Imaging: I have reviewed all pertinent imaging results/findings within the past 24 hours.

## 2022-09-25 NOTE — PROGRESS NOTES
[facility] Medicine  Progress Note    Patient Name: Author Rafael Lawson  MRN: [id number]  Patient Class: OP- Observation   Admission Date: 9/23/2022  Length of Stay: 0 days  Attending Physician: Lukas Linares MD  Primary Care Provider: No primary care provider on file.        Subjective:     Principal Problem:Anemia        HPI:  Author Hall is a 72 yo male with an unremarkable PMHx. He presented to the ED at [facility] with a cc of shortness of breath that worsened over the past few days. Associated symptoms include leg swelling and fatigue. Patient denies chest pain, abdominal pain, nausea, and vomiting.     ED workup revealed /67, , R25, EKG revealed sinus tachycardia with no ST-T changes. Na 130, CO2 21, Albumin 2.6, Troponin 0.045, D Dimer 9.38, , Hgb 7.2, Hct 21.1, Platelets 100, CXR unremarkable, CTA with no evidence of pulmonary embolism. Admitted to hospital medicine for further evaluation and treatment.       Overview/Hospital Course:  No notes on file    Interval History: No acute events. Hb <7 despite transfusion yesterday. No signs of bleeding. CT chest/abd/pelvis showing diffuse LAD concerning for lymphoma. Pt not sure if he's willing to have biopsy done. States SOB has improved but still gets fatigued easily.    Review of Systems   Constitutional:  Positive for fatigue. Negative for unexpected weight change.   HENT:  Negative for congestion.    Eyes:  Negative for visual disturbance.   Respiratory:  Negative for shortness of breath.    Cardiovascular:  Positive for leg swelling. Negative for chest pain and palpitations.   Gastrointestinal:  Negative for vomiting.   Endocrine: Negative for polydipsia and polyuria.   Genitourinary:  Negative for difficulty urinating.   Musculoskeletal:  Negative for arthralgias and gait problem.   Skin:  Negative for color change and rash.   Allergic/Immunologic: Negative for food allergies.   Neurological:  Positive for weakness.  Negative for dizziness.   Psychiatric/Behavioral:  Negative for agitation.    Objective:     Vital Signs (Most Recent):  Temp: 96.7 °F (35.9 °C) (09/25/22 1233)  Pulse: 77 (09/25/22 1233)  Resp: 18 (09/25/22 1233)  BP: (!) 106/52 (09/25/22 1233)  SpO2: 99 % (09/25/22 1233)   Vital Signs (24h Range):  Temp:  [96.7 °F (35.9 °C)-98.3 °F (36.8 °C)] 96.7 °F (35.9 °C)  Pulse:  [74-91] 77  Resp:  [18-20] 18  SpO2:  [94 %-100 %] 99 %  BP: (106-136)/(52-63) 106/52     Weight: 112.6 kg (248 lb 3.8 oz)  Body mass index is 32.75 kg/m².    Intake/Output Summary (Last 24 hours) at 9/25/2022 1317  Last data filed at 9/25/2022 0549  Gross per 24 hour   Intake 547.5 ml   Output 600 ml   Net -52.5 ml        Physical Exam  HENT:      Head: Atraumatic.      Mouth/Throat:      Mouth: Mucous membranes are moist.   Cardiovascular:      Rate and Rhythm: Normal rate. Rhythm irregular.   Pulmonary:      Breath sounds: Rales present.   Abdominal:      General: Abdomen is flat.   Musculoskeletal:      Right lower leg: Edema present.      Left lower leg: Edema present.      Comments: Pitting edema bilateral lower extremities up to tibial tuberosity.     Skin:     General: Skin is warm and dry.      Capillary Refill: Capillary refill takes less than 2 seconds.   Neurological:      General: No focal deficit present.      Mental Status: He is alert and oriented to person, place, and time. Mental status is at baseline.   Psychiatric:         Mood and Affect: Mood normal.       Significant Labs: All pertinent labs within the past 24 hours have been reviewed.    Significant Imaging: I have reviewed all pertinent imaging results/findings within the past 24 hours.      Assessment/Plan:      * Anemia  Symptomatic anemia  Anemia workup showing AOCD  No signs of bleeding  Transfuse another 1u pRBC (2 total)  Trend H/H        Pancytopenia        Enlargement of lymph node  Diffuse lymphadenopathy on imaging, concerning for lymphoma  Heme/Onc consulted  Will  require excisional biopsy. Pt unsure if he is willing to have biopsy performed despite extensive discussion regarding risks/benefits.      SOB (shortness of breath)  He presented to ED cc of shortness of breath, leg swelling and fatigue  EKG, sinus tachy  Albumin 2.6, Troponin 0.045, D Dimer 9.38,   CXR unremarkable, CTA with no evidence of pulmonary embolism  Cardiology consulted   Etiology likely symptomatic anemia  TTE with normal function  Transfuse prbc      VTE Risk Mitigation (From admission, onward)         Ordered     enoxaparin injection 40 mg  Daily         09/23/22 2231                Discharge Planning   ANUSHA:      Code Status: Full Code   Is the patient medically ready for discharge?:     Reason for patient still in hospital (select all that apply): Patient trending condition, Treatment and Consult recommendations                     Lukas Linares MD  Department of Hospital Medicine   [facility]

## 2022-09-25 NOTE — ASSESSMENT & PLAN NOTE
Symptomatic anemia  Anemia workup showing AOCD  No signs of bleeding  Transfuse another 1u pRBC (2 total)  Trend H/H

## 2022-09-26 LAB
BASOPHILS NFR BLD: 0 % (ref 0–1.9)
BLD PROD TYP BPU: NORMAL
BLOOD GROUP ANTIBODIES SERPL: NORMAL
BLOOD UNIT EXPIRATION DATE: NORMAL
BLOOD UNIT TYPE CODE: 6200
BLOOD UNIT TYPE: NORMAL
CODING SYSTEM: NORMAL
DIFFERENTIAL METHOD: ABNORMAL
DISPENSE STATUS: NORMAL
EOSINOPHIL NFR BLD: 1 % (ref 0–8)
ERYTHROCYTE [DISTWIDTH] IN BLOOD BY AUTOMATED COUNT: 15.5 % (ref 11.5–14.5)
HCT VFR BLD AUTO: 21.8 % (ref 40–54)
HGB BLD-MCNC: 7.2 G/DL (ref 14–18)
IMM GRANULOCYTES # BLD AUTO: ABNORMAL K/UL (ref 0–0.04)
IMM GRANULOCYTES NFR BLD AUTO: ABNORMAL % (ref 0–0.5)
LYMPHOCYTES NFR BLD: 13 % (ref 18–48)
MCH RBC QN AUTO: 28.5 PG (ref 27–31)
MCHC RBC AUTO-ENTMCNC: 33 G/DL (ref 32–36)
MCV RBC AUTO: 86 FL (ref 82–98)
MONOCYTES NFR BLD: 4 % (ref 4–15)
NEUTROPHILS NFR BLD: 80 % (ref 38–73)
NEUTS BAND NFR BLD MANUAL: 2 %
NRBC BLD-RTO: 0 /100 WBC
NUM UNITS TRANS PACKED RBC: NORMAL
PLATELET # BLD AUTO: 115 K/UL (ref 150–450)
PLATELET BLD QL SMEAR: ABNORMAL
PMV BLD AUTO: 10.6 FL (ref 9.2–12.9)
RBC # BLD AUTO: 2.53 M/UL (ref 4.6–6.2)
TROPONIN I SERPL DL<=0.01 NG/ML-MCNC: 0.03 NG/ML (ref 0–0.03)
WBC # BLD AUTO: 3.24 K/UL (ref 3.9–12.7)

## 2022-09-26 PROCEDURE — 36415 COLL VENOUS BLD VENIPUNCTURE: CPT | Performed by: INTERNAL MEDICINE

## 2022-09-26 PROCEDURE — 85007 BL SMEAR W/DIFF WBC COUNT: CPT | Mod: NCS | Performed by: INTERNAL MEDICINE

## 2022-09-26 PROCEDURE — 25000003 PHARM REV CODE 250: Performed by: NURSE PRACTITIONER

## 2022-09-26 PROCEDURE — 63600175 PHARM REV CODE 636 W HCPCS: Performed by: NURSE PRACTITIONER

## 2022-09-26 PROCEDURE — G0378 HOSPITAL OBSERVATION PER HR: HCPCS

## 2022-09-26 PROCEDURE — 94761 N-INVAS EAR/PLS OXIMETRY MLT: CPT

## 2022-09-26 PROCEDURE — 93010 ELECTROCARDIOGRAM REPORT: CPT | Mod: ,,, | Performed by: INTERNAL MEDICINE

## 2022-09-26 PROCEDURE — 93005 ELECTROCARDIOGRAM TRACING: CPT

## 2022-09-26 PROCEDURE — 96372 THER/PROPH/DIAG INJ SC/IM: CPT | Performed by: NURSE PRACTITIONER

## 2022-09-26 PROCEDURE — 84484 ASSAY OF TROPONIN QUANT: CPT | Performed by: INTERNAL MEDICINE

## 2022-09-26 PROCEDURE — 85027 COMPLETE CBC AUTOMATED: CPT | Performed by: INTERNAL MEDICINE

## 2022-09-26 PROCEDURE — 99900035 HC TECH TIME PER 15 MIN (STAT)

## 2022-09-26 PROCEDURE — P9016 RBC LEUKOCYTES REDUCED: HCPCS | Performed by: INTERNAL MEDICINE

## 2022-09-26 PROCEDURE — 93010 EKG 12-LEAD: ICD-10-PCS | Mod: ,,, | Performed by: INTERNAL MEDICINE

## 2022-09-26 PROCEDURE — 94760 N-INVAS EAR/PLS OXIMETRY 1: CPT

## 2022-09-26 RX ORDER — HYDROCODONE BITARTRATE AND ACETAMINOPHEN 500; 5 MG/1; MG/1
TABLET ORAL
Status: DISCONTINUED | OUTPATIENT
Start: 2022-09-26 | End: 2022-09-27 | Stop reason: HOSPADM

## 2022-09-26 RX ADMIN — ACETAMINOPHEN 650 MG: 325 TABLET, FILM COATED ORAL at 11:09

## 2022-09-26 RX ADMIN — ACETAMINOPHEN 650 MG: 325 TABLET, FILM COATED ORAL at 06:09

## 2022-09-26 RX ADMIN — ENOXAPARIN SODIUM 40 MG: 100 INJECTION SUBCUTANEOUS at 04:09

## 2022-09-26 NOTE — DISCHARGE SUMMARY
[facility] Medicine  Discharge Summary      Patient Name: Author Rafael Lawson  MRN: [id number]  Patient Class: OP- Observation  Admission Date: 9/23/2022  Hospital Length of Stay: 0 days  Discharge Date and Time: No discharge date for patient encounter.  Attending Physician: Sara Linares MD   Discharging Provider: Sara Linares MD  Primary Care Provider: No primary care provider on file.      HPI:   Author Hall is a 70 yo male with an unremarkable PMHx. He presented to the ED at [facility] with a cc of shortness of breath that worsened over the past few days. Associated symptoms include leg swelling and fatigue. Patient denies chest pain, abdominal pain, nausea, and vomiting.     ED workup revealed /67, , R25, EKG revealed sinus tachycardia with no ST-T changes. Na 130, CO2 21, Albumin 2.6, Troponin 0.045, D Dimer 9.38, , Hgb 7.2, Hct 21.1, Platelets 100, CXR unremarkable, CTA with no evidence of pulmonary embolism. Admitted to hospital medicine for further evaluation and treatment.       * No surgery found *      Hospital Course:   See individual problem list.       Goals of Care Treatment Preferences:  Code Status: Full Code      Consults:   Consults (From admission, onward)        Status Ordering Provider     Inpatient consult to Hematology/Oncology  Once        Provider:  (Not yet assigned)    Completed SARA LINARES     Inpatient consult to Cardiology-[facility]  Once        Provider:  (Not yet assigned)    Completed SNICOLE          * Anemia  Symptomatic anemia  Anemia workup showing AOCD, possibly 2/2 lymphoma  No signs of bleeding  Transfuse another 1u pRBC (3 total)  Discharge home after with PCP and heme/onc follow up        Pancytopenia        Enlargement of lymph node  Diffuse lymphadenopathy on imaging, concerning for lymphoma  Heme/Onc consulted  Will require excisional biopsy. Pt unsure if he is willing to have biopsy performed despite extensive  discussion regarding risks/benefits.  Outpatient follow up with heme/onc      SOB (shortness of breath)  He presented to ED cc of shortness of breath, leg swelling and fatigue  EKG, sinus tachy  Albumin 2.6, Troponin 0.045, D Dimer 9.38,   CXR unremarkable, CTA with no evidence of pulmonary embolism  Cardiology consulted   Etiology likely symptomatic anemia  TTE with normal function  Transfuse additional unit prbc (3 total)      Final Active Diagnoses:    Diagnosis Date Noted POA    PRINCIPAL PROBLEM:  Anemia [D64.9] 09/24/2022 Yes    Enlargement of lymph node [R59.9] 09/24/2022 Yes    Pancytopenia [D61.818] 09/24/2022 Yes    SOB (shortness of breath) [R06.02] 09/23/2022 Yes      Problems Resolved During this Admission:       Discharged Condition: good    Disposition: Home or Self Care    Follow Up:    Patient Instructions:      Ambulatory referral/consult to Hematology / Oncology   Standing Status: Future   Referral Priority: Routine Referral Type: Consultation   Referral Reason: Specialty Services Required   Requested Specialty: Hematology and Oncology   Number of Visits Requested: 1     Ambulatory referral/consult to Internal Medicine   Standing Status: Future   Referral Priority: Routine Referral Type: Consultation   Referral Reason: Specialty Services Required   Requested Specialty: Internal Medicine   Number of Visits Requested: 1     Diet Adult Regular     Notify your health care provider if you experience any of the following:  temperature >100.4     Notify your health care provider if you experience any of the following:  difficulty breathing or increased cough     Notify your health care provider if you experience any of the following:  persistent dizziness, light-headedness, or visual disturbances     Notify your health care provider if you experience any of the following:  increased confusion or weakness     Activity as tolerated       Significant Diagnostic Studies: Labs: All labs within the  past 24 hours have been reviewed    Pending Diagnostic Studies:     Procedure Component Value Units Date/Time    EKG 12-lead [[id number]]     Order Status: Sent Lab Status: No result          Medications:  Reconciled Home Medications:      Medication List      You have not been prescribed any medications.         Indwelling Lines/Drains at time of discharge:   Lines/Drains/Airways     None                 Time spent on the discharge of patient: 35 minutes         Lukas Linares MD  Department of Hospital Medicine  [facility]

## 2022-09-26 NOTE — PLAN OF CARE
SW met with pt at bedside to complete assessment. Pt is AxO 3 and able to verbally answer assessment questions. Pt is able to confirm demographic information and reports no PCP. Pt is to be scheduled at [facility] for hospital follow up and will then establish care with a physician. Pt reports having 2 children who live in [place] but  being estranged and would like staff to respect pt wishes. Pt reports receiving SSI and working as a  for [employer]. Pt reports currently living at [facility] on [address] [facility] in [place]. Pt reports being interested in a group home or assisted living and understands he would have to pay money. Pt reports ability to drive and reports drove self to ED. At time of discharge pt to drive self back to [facility]. Pt reports to be independent of ADLs and no DME in use. Pt reports on no medications currently. Pt is pleasant, open to assistance, but poor historian. SW updated whiteboard with [facility] name and contact information. SW confirmed pt understanding of Observation unit and expected discharge plan. SW will continue to follow pt throughout care and assist with any discharge needs.         09/26/22 1200   Discharge Planning   Assessment Type Discharge Planning Brief Assessment   Support Systems None   Equipment Currently Used at Home none   Current Living Arrangements home/apartment/condo   Patient/Family Anticipates Transition to home   Patient/Family Anticipated Services at Transition none   DME Needed Upon Discharge  none   Discharge Plan A Home   Discharge Plan B Group home     Future Appointments   Date Time Provider Department Center   10/11/2022  2:00 PM Licha Franco MD [facility] Case Management  [phone]

## 2022-09-26 NOTE — PLAN OF CARE
Problem: Adult Inpatient Plan of Care  Goal: Plan of Care Review  Outcome: Ongoing, Progressing  Chart and care plan reviewed

## 2022-09-26 NOTE — ASSESSMENT & PLAN NOTE
He presented to ED cc of shortness of breath, leg swelling and fatigue  EKG, sinus tachy  Albumin 2.6, Troponin 0.045, D Dimer 9.38,   CXR unremarkable, CTA with no evidence of pulmonary embolism  Cardiology consulted   Etiology likely symptomatic anemia  TTE with normal function  Transfuse additional unit prbc (3 total)

## 2022-09-26 NOTE — ASSESSMENT & PLAN NOTE
Symptomatic anemia  Anemia workup showing AOCD, possibly 2/2 lymphoma  No signs of bleeding  Transfuse another 1u pRBC (3 total)  Discharge home after with PCP and heme/onc follow up

## 2022-09-26 NOTE — PLAN OF CARE
VN note: VN completed AVS and attachments and notified bedside nurseCherie. Will cont to be available and intervene prn.

## 2022-09-26 NOTE — HOSPITAL COURSE
"See individual problem list.    BP (!) 115/56 (Patient Position: Lying)   Pulse 94   Temp 98.8 °F (37.1 °C) (Oral)   Resp 17   Ht 6' 1" (1.854 m)   Wt 112.6 kg (248 lb 3.8 oz)   SpO2 97%   BMI 32.75 kg/m²   Physical Exam  HENT:      Head: Atraumatic.      Mouth/Throat:      Mouth: Mucous membranes are moist.   Cardiovascular:      Rate and Rhythm: Normal rate. Rhythm irregular.   Pulmonary:      Breath sounds: Rales present.   Abdominal:      General: Abdomen is flat.   Musculoskeletal:      Right lower leg: Edema present.      Left lower leg: Edema present.      Comments: Pitting edema bilateral lower extremities up to tibial tuberosity.     Skin:     General: Skin is warm and dry.      Capillary Refill: Capillary refill takes less than 2 seconds.   Neurological:      General: No focal deficit present.      Mental Status: He is alert and oriented to person, place, and time. Mental status is at baseline.   Psychiatric:         Mood and Affect: Mood normal.  Joint of  "

## 2022-09-26 NOTE — TREATMENT PLAN
[facility] met with [facility] regarding [facility] hospital follow up upon discharge. Pt agreeable to hospital follow up .RN informed pt of scheduled appointment and that appointment will also appear on d/c AVS. Patient informed to bring portable home O2 if used and all medication bottles to [facility] follow up appointment.  [facility] information handout, appointment card and folder provided to patient. Pt states he will drive himself to appointment.    Patient Contact info: [phone]    Person providing transportation contact info: self    Barriers to attending [facility] visit: none noted    Future Appointments   Date Time Provider Department Center   10/11/2022  2:00 PM Licha Franco MD [facility] RAYRAY [place]ne

## 2022-09-26 NOTE — CONSULTS
This note is to link the consult order to Dr. Green's consult note dated 9/24/22.    Fransisco Raines M.D.  Hematology/Oncology  [facility] - [address], [address]  [place] [address]  Phone: [phone]  Fax: [phone]

## 2022-09-26 NOTE — ASSESSMENT & PLAN NOTE
Diffuse lymphadenopathy on imaging, concerning for lymphoma  Heme/Onc consulted  Will require excisional biopsy. Pt unsure if he is willing to have biopsy performed despite extensive discussion regarding risks/benefits.  Outpatient follow up with heme/onc

## 2022-09-26 NOTE — CARE UPDATE
Seen by Dr. Skaggs for consultation of SOB with mild troponin rise. Initial troponin .045. CTA with no evidence of PE and mild adenopathy to mediastinum, bilateral hilum, bilateral axilla and upper abdomen concern for lymphoma or metastatic disease. Hem Onc consulted with concern for lymphoma as well with recommendation for biopsy however patient expressed desire to avoid surgery or procedures. Troponin repeated and down to .043. Echocardiogram with normal LVEF. Mild troponin rise felt to be related to demand etiology in setting of anemia (Hgb 6-7). At this point, SOB and mild troponin rise likely not related to major cardiac etiology and recommend continued management per primary team and Hem Onc recs. Cardiology to sign off for now

## 2022-09-27 VITALS
OXYGEN SATURATION: 96 % | HEART RATE: 84 BPM | DIASTOLIC BLOOD PRESSURE: 58 MMHG | HEIGHT: 73 IN | TEMPERATURE: 98 F | BODY MASS INDEX: 32.9 KG/M2 | WEIGHT: 248.25 LBS | RESPIRATION RATE: 17 BRPM | SYSTOLIC BLOOD PRESSURE: 118 MMHG

## 2022-09-27 PROCEDURE — 25000003 PHARM REV CODE 250: Performed by: HOSPITALIST

## 2022-09-27 PROCEDURE — 99900035 HC TECH TIME PER 15 MIN (STAT)

## 2022-09-27 PROCEDURE — 96376 TX/PRO/DX INJ SAME DRUG ADON: CPT

## 2022-09-27 PROCEDURE — G0378 HOSPITAL OBSERVATION PER HR: HCPCS

## 2022-09-27 PROCEDURE — 63600175 PHARM REV CODE 636 W HCPCS: Performed by: HOSPITALIST

## 2022-09-27 PROCEDURE — 94761 N-INVAS EAR/PLS OXIMETRY MLT: CPT

## 2022-09-27 RX ORDER — LIDOCAINE 50 MG/G
2 PATCH TOPICAL
Status: DISCONTINUED | OUTPATIENT
Start: 2022-09-27 | End: 2022-09-27 | Stop reason: HOSPADM

## 2022-09-27 RX ORDER — FUROSEMIDE 10 MG/ML
20 INJECTION INTRAMUSCULAR; INTRAVENOUS ONCE
Status: COMPLETED | OUTPATIENT
Start: 2022-09-27 | End: 2022-09-27

## 2022-09-27 RX ORDER — DICLOFENAC SODIUM 10 MG/G
4 GEL TOPICAL DAILY
Status: DISCONTINUED | OUTPATIENT
Start: 2022-09-27 | End: 2022-09-27 | Stop reason: HOSPADM

## 2022-09-27 RX ORDER — DICLOFENAC SODIUM 10 MG/G
4 GEL TOPICAL DAILY
Qty: 50 G | Refills: 3 | Status: ON HOLD | OUTPATIENT
Start: 2022-09-27 | End: 2022-10-11 | Stop reason: HOSPADM

## 2022-09-27 RX ADMIN — FUROSEMIDE 20 MG: 10 INJECTION, SOLUTION INTRAMUSCULAR; INTRAVENOUS at 08:09

## 2022-09-27 RX ADMIN — LIDOCAINE 2 PATCH: 50 PATCH TOPICAL at 08:09

## 2022-09-27 NOTE — ASSESSMENT & PLAN NOTE
He presented to ED cc of shortness of breath, leg swelling and fatigue  EKG, sinus tachy  Albumin 2.6, Troponin 0.045, D Dimer 9.38,   CXR unremarkable, CTA with no evidence of pulmonary embolism  Cardiology consulted   Etiology likely symptomatic anemia  TTE with normal function  Transfuse additional unit prbc (3 total)  -given dose of IV Lasix with transfusion with improvement

## 2022-09-27 NOTE — PLAN OF CARE
D/C recs noted. Pt to have follow up with PCC and Hema/onc. Pt also has resources added to AVS to include group home resource. Pt also added shelter resource added to AVS for hygiene and placement support. Pharmacist will go over home medications and reasons for medications. VN and bedside nurse to reiterate final discharge instructions.       At time of discharge pt will be transported back to [facility] by himself due to van in parking lot.    DME at discharge: none  Home Health: none    Pt has follow up appointments added to AVS.         09/27/22 1523   Final Note   Assessment Type Final Discharge Note   Anticipated Discharge Disposition Home   What phone number can be called within the next 1-3 days to see how you are doing after discharge? [phone]   Hospital Resources/Appts/Education Provided Appointments scheduled and added to AVS   Post-Acute Status   Discharge Delays None known at this time     Future Appointments   Date Time Provider Department Center   10/11/2022  2:00 PM Licha Franco MD [facility] IMPRI Sharonda Harperi   10/13/2022 11:20 AM Fransisco Raines MD [facility] HEM ONC [facility] Case Management  [phone]

## 2022-09-27 NOTE — DISCHARGE SUMMARY
"[facility]  Discharge Summary      Patient Name: Author Rafael Lawson  MRN: [id number]  Patient Class: OP- Observation  Admission Date: 9/23/2022  Hospital Length of Stay: 0 days  Discharge Date and Time:  09/27/2022 11:26 AM  Attending Physician: Dayne Roman, *   Discharging Provider: Dayne Roman MD  Primary Care Provider: No primary care provider on file.      HPI:   Author Hall is a 70 yo male with an unremarkable PMHx. He presented to the ED at [facility] with a cc of shortness of breath that worsened over the past few days. Associated symptoms include leg swelling and fatigue. Patient denies chest pain, abdominal pain, nausea, and vomiting.     ED workup revealed /67, , R25, EKG revealed sinus tachycardia with no ST-T changes. Na 130, CO2 21, Albumin 2.6, Troponin 0.045, D Dimer 9.38, , Hgb 7.2, Hct 21.1, Platelets 100, CXR unremarkable, CTA with no evidence of pulmonary embolism. Admitted to hospital medicine for further evaluation and treatment.       * No surgery found *      Hospital Course:   See individual problem list.    BP (!) 115/56 (Patient Position: Lying)   Pulse 94   Temp 98.8 °F (37.1 °C) (Oral)   Resp 17   Ht 6' 1" (1.854 m)   Wt 112.6 kg (248 lb 3.8 oz)   SpO2 97%   BMI 32.75 kg/m²   Physical Exam  HENT:      Head: Atraumatic.      Mouth/Throat:      Mouth: Mucous membranes are moist.   Cardiovascular:      Rate and Rhythm: Normal rate. Rhythm irregular.   Pulmonary:      Breath sounds: Rales present.   Abdominal:      General: Abdomen is flat.   Musculoskeletal:      Right lower leg: Edema present.      Left lower leg: Edema present.      Comments: Pitting edema bilateral lower extremities up to tibial tuberosity.     Skin:     General: Skin is warm and dry.      Capillary Refill: Capillary refill takes less than 2 seconds.   Neurological:      General: No focal deficit present.      Mental Status: He is alert and " oriented to person, place, and time. Mental status is at baseline.   Psychiatric:         Mood and Affect: Mood normal.  Joint of       Goals of Care Treatment Preferences:  Code Status: Full Code      Consults:   Consults (From admission, onward)        Status Ordering Provider     Inpatient consult to Hematology/Oncology  Once        Provider:  (Not yet assigned)    Completed SARA FORTE     Inpatient consult to Cardiology-[facility]  Once        Provider:  (Not yet assigned)    Completed NICOLE MILLIGAN          * Anemia  Symptomatic anemia  Anemia workup showing AOCD, possibly 2/2 lymphoma  No signs of bleeding  Transfuse another 1u pRBC (3 total)  Discharge home after with PCP and heme/onc follow up        Pancytopenia  -see above   -Heme-Onc workup      Enlargement of lymph node  Diffuse lymphadenopathy on imaging, concerning for lymphoma  Heme/Onc consulted  Will require excisional biopsy. Pt unsure if he is willing to have biopsy performed despite extensive discussion regarding risks/benefits.  Outpatient follow up with heme/onc      SOB (shortness of breath)  He presented to ED cc of shortness of breath, leg swelling and fatigue  EKG, sinus tachy  Albumin 2.6, Troponin 0.045, D Dimer 9.38,   CXR unremarkable, CTA with no evidence of pulmonary embolism  Cardiology consulted   Etiology likely symptomatic anemia  TTE with normal function  Transfuse additional unit prbc (3 total)  -given dose of IV Lasix with transfusion with improvement      Final Active Diagnoses:    Diagnosis Date Noted POA    PRINCIPAL PROBLEM:  Anemia [D64.9] 09/24/2022 Yes    Enlargement of lymph node [R59.9] 09/24/2022 Yes    Pancytopenia [D61.818] 09/24/2022 Yes    SOB (shortness of breath) [R06.02] 09/23/2022 Yes      Problems Resolved During this Admission:       Discharged Condition: fair    Disposition: Home or Self Care    Follow Up:   Follow-up Information     [facility] [place]. Go to.    Why: As needed for hygiene  and shower stations, housing assistance, and other resources.  Contact information:  [address] [place] [address]           [facility]. Call.    Why: As needed for assistance with housing and group home entrance planning.  Contact information:  [phone]                     Patient Instructions:      Ambulatory referral/consult to Hematology / Oncology   Standing Status: Future   Referral Priority: Routine Referral Type: Consultation   Referral Reason: Specialty Services Required   Requested Specialty: Hematology and Oncology   Number of Visits Requested: 1     Ambulatory referral/consult to Internal Medicine   Standing Status: Future   Referral Priority: Routine Referral Type: Consultation   Referral Reason: Specialty Services Required   Requested Specialty: Internal Medicine   Number of Visits Requested: 1     Diet Adult Regular     Diet Adult Regular     Notify your health care provider if you experience any of the following:  temperature >100.4     Notify your health care provider if you experience any of the following:  difficulty breathing or increased cough     Notify your health care provider if you experience any of the following:  persistent dizziness, light-headedness, or visual disturbances     Notify your health care provider if you experience any of the following:  increased confusion or weakness     Notify your health care provider if you experience any of the following:  temperature >100.4     Notify your health care provider if you experience any of the following:  persistent nausea and vomiting or diarrhea     Notify your health care provider if you experience any of the following:  severe uncontrolled pain     Notify your health care provider if you experience any of the following:  difficulty breathing or increased cough     Notify your health care provider if you experience any of the following:  severe persistent headache     Notify your health care provider  if you experience any of the following:  persistent dizziness, light-headedness, or visual disturbances     Notify your health care provider if you experience any of the following:  increased confusion or weakness     Activity as tolerated     Activity as tolerated       Significant Diagnostic Studies:  See above    Pending Diagnostic Studies:     None         Medications:  Reconciled Home Medications:      Medication List      START taking these medications    diclofenac sodium 1 % Gel  Commonly known as: VOLTAREN  Apply 4 g topically once daily.            Indwelling Lines/Drains at time of discharge:   Lines/Drains/Airways     None                 Time spent on the discharge of patient: 32 minutes         Dayne Roman MD  Department of Hospital Medicine  [facility]

## 2022-09-27 NOTE — PLAN OF CARE
VN note: VN completed AVS and attachments and notified bedside nurse, Alivia. Will cont to be available and intervene prn.

## 2022-09-27 NOTE — PLAN OF CARE
AVS and educational attachments shared with patient via [employer]. Discussed thoroughly. Patient verbalized clear understanding using teach back method. Notified bedside nurse of completion of education. At present no distress noted. Patient to be discharged via w/c with escort service with all of patient's belonings. Will cont to be available to patient and intervene prn.

## 2022-10-05 ENCOUNTER — HOSPITAL ENCOUNTER (INPATIENT)
Facility: HOSPITAL | Age: 72
LOS: 6 days | Discharge: NURSING, REHAB OR LONG-TERM CARE | DRG: 682 | End: 2022-10-12
Attending: EMERGENCY MEDICINE | Admitting: INTERNAL MEDICINE
Payer: MEDICARE

## 2022-10-05 DIAGNOSIS — R55 SYNCOPE AND COLLAPSE: ICD-10-CM

## 2022-10-05 DIAGNOSIS — Z91.89 AT RISK FOR LONG QT SYNDROME: ICD-10-CM

## 2022-10-05 DIAGNOSIS — I65.01 OCCLUSION AND STENOSIS OF RIGHT VERTEBRAL ARTERY: ICD-10-CM

## 2022-10-05 DIAGNOSIS — E86.1 INTRAVASCULAR VOLUME DEPLETION: ICD-10-CM

## 2022-10-05 DIAGNOSIS — R41.82 AMS (ALTERED MENTAL STATUS): ICD-10-CM

## 2022-10-05 DIAGNOSIS — R55 SYNCOPE, CARDIOGENIC: ICD-10-CM

## 2022-10-05 DIAGNOSIS — R40.0 SOMNOLENCE: ICD-10-CM

## 2022-10-05 DIAGNOSIS — N17.9 AKI (ACUTE KIDNEY INJURY): Primary | ICD-10-CM

## 2022-10-05 DIAGNOSIS — R00.0 TACHYCARDIA: ICD-10-CM

## 2022-10-05 DIAGNOSIS — R06.02 SHORTNESS OF BREATH: ICD-10-CM

## 2022-10-05 PROBLEM — E87.1 HYPONATREMIA: Status: ACTIVE | Noted: 2022-10-05

## 2022-10-05 PROBLEM — Z75.8 DISCHARGE PLANNING ISSUES: Status: ACTIVE | Noted: 2022-10-05

## 2022-10-05 PROBLEM — R74.01 TRANSAMINITIS: Status: ACTIVE | Noted: 2022-10-05

## 2022-10-05 LAB
ABO GROUP BLD: NORMAL
ALBUMIN SERPL BCP-MCNC: 2.4 G/DL (ref 3.5–5.2)
ALP SERPL-CCNC: 133 U/L (ref 55–135)
ALT SERPL W/O P-5'-P-CCNC: 42 U/L (ref 10–44)
AMPHET+METHAMPHET UR QL: NEGATIVE
ANION GAP SERPL CALC-SCNC: 13 MMOL/L (ref 8–16)
ANISOCYTOSIS BLD QL SMEAR: SLIGHT
AST SERPL-CCNC: 45 U/L (ref 10–40)
BACTERIA #/AREA URNS HPF: NORMAL /HPF
BARBITURATES UR QL SCN>200 NG/ML: NEGATIVE
BASOPHILS NFR BLD: 0 % (ref 0–1.9)
BENZODIAZ UR QL SCN>200 NG/ML: NEGATIVE
BILIRUB SERPL-MCNC: 2.6 MG/DL (ref 0.1–1)
BILIRUB UR QL STRIP: NEGATIVE
BLD GP AB SCN CELLS X3 SERPL QL: ABNORMAL
BLOOD GROUP ANTIBODIES SERPL: NORMAL
BNP SERPL-MCNC: 63 PG/ML (ref 0–99)
BUN SERPL-MCNC: 51 MG/DL (ref 8–23)
BZE UR QL SCN: NEGATIVE
CALCIUM SERPL-MCNC: 9.7 MG/DL (ref 8.7–10.5)
CANNABINOIDS UR QL SCN: NEGATIVE
CHLORIDE SERPL-SCNC: 103 MMOL/L (ref 95–110)
CK SERPL-CCNC: 134 U/L (ref 20–200)
CLARITY UR: CLEAR
CO2 SERPL-SCNC: 18 MMOL/L (ref 23–29)
COLOR UR: YELLOW
CREAT SERPL-MCNC: 1.6 MG/DL (ref 0.5–1.4)
CREAT UR-MCNC: 115.8 MG/DL (ref 23–375)
CREAT UR-MCNC: 116.3 MG/DL (ref 23–375)
DAT IGG-SP REAG RBC-IMP: NORMAL
DIFFERENTIAL METHOD: ABNORMAL
EOSINOPHIL NFR BLD: 0 % (ref 0–8)
ERYTHROCYTE [DISTWIDTH] IN BLOOD BY AUTOMATED COUNT: 15.8 % (ref 11.5–14.5)
EST. GFR  (NO RACE VARIABLE): 45 ML/MIN/1.73 M^2
ETHANOL SERPL-MCNC: <10 MG/DL
GLUCOSE SERPL-MCNC: 147 MG/DL (ref 70–110)
GLUCOSE UR QL STRIP: NEGATIVE
HCT VFR BLD AUTO: 23 % (ref 40–54)
HGB BLD-MCNC: 7.9 G/DL (ref 14–18)
HGB UR QL STRIP: NEGATIVE
HYALINE CASTS #/AREA URNS LPF: 0 /LPF
HYPOCHROMIA BLD QL SMEAR: ABNORMAL
IMM GRANULOCYTES # BLD AUTO: ABNORMAL K/UL (ref 0–0.04)
IMM GRANULOCYTES NFR BLD AUTO: ABNORMAL % (ref 0–0.5)
INFLUENZA A, MOLECULAR: NEGATIVE
INFLUENZA B, MOLECULAR: NEGATIVE
KETONES UR QL STRIP: NEGATIVE
LEUKOCYTE ESTERASE UR QL STRIP: NEGATIVE
LYMPHOCYTES NFR BLD: 9 % (ref 18–48)
MAGNESIUM SERPL-MCNC: 2.4 MG/DL (ref 1.6–2.6)
MCH RBC QN AUTO: 29.2 PG (ref 27–31)
MCHC RBC AUTO-ENTMCNC: 34.3 G/DL (ref 32–36)
MCV RBC AUTO: 85 FL (ref 82–98)
METHADONE UR QL SCN>300 NG/ML: NEGATIVE
MICROSCOPIC COMMENT: NORMAL
MONOCYTES NFR BLD: 6 % (ref 4–15)
NEUTROPHILS NFR BLD: 81 % (ref 38–73)
NEUTS BAND NFR BLD MANUAL: 4 %
NITRITE UR QL STRIP: NEGATIVE
NRBC BLD-RTO: 0 /100 WBC
OPIATES UR QL SCN: NEGATIVE
PCP UR QL SCN>25 NG/ML: NEGATIVE
PH UR STRIP: 5 [PH] (ref 5–8)
PLATELET # BLD AUTO: ABNORMAL K/UL (ref 150–450)
PLATELET BLD QL SMEAR: ABNORMAL
PMV BLD AUTO: 12.1 FL (ref 9.2–12.9)
POTASSIUM SERPL-SCNC: 4.5 MMOL/L (ref 3.5–5.1)
PROT SERPL-MCNC: 7.5 G/DL (ref 6–8.4)
PROT UR QL STRIP: ABNORMAL
PROT UR-MCNC: 32 MG/DL (ref 0–15)
PROT/CREAT UR: 0.28 MG/G{CREAT} (ref 0–0.2)
RBC # BLD AUTO: 2.71 M/UL (ref 4.6–6.2)
RBC #/AREA URNS HPF: 2 /HPF (ref 0–4)
RH BLD: NORMAL
SARS-COV-2 RDRP RESP QL NAA+PROBE: NEGATIVE
SODIUM SERPL-SCNC: 134 MMOL/L (ref 136–145)
SODIUM SERPL-SCNC: 137 MMOL/L (ref 136–145)
SODIUM SERPL-SCNC: 138 MMOL/L (ref 136–145)
SODIUM UR-SCNC: <20 MMOL/L (ref 20–250)
SP GR UR STRIP: 1.02 (ref 1–1.03)
SPECIMEN SOURCE: NORMAL
TOXICOLOGY INFORMATION: NORMAL
TROPONIN I SERPL DL<=0.01 NG/ML-MCNC: 0.02 NG/ML (ref 0–0.03)
TROPONIN I SERPL DL<=0.01 NG/ML-MCNC: 0.03 NG/ML (ref 0–0.03)
TROPONIN I SERPL DL<=0.01 NG/ML-MCNC: 0.03 NG/ML (ref 0–0.03)
TROPONIN I SERPL DL<=0.01 NG/ML-MCNC: 0.04 NG/ML (ref 0–0.03)
TSH SERPL DL<=0.005 MIU/L-ACNC: 1.75 UIU/ML (ref 0.4–4)
URN SPEC COLLECT METH UR: ABNORMAL
UROBILINOGEN UR STRIP-ACNC: ABNORMAL EU/DL
WBC # BLD AUTO: 5.89 K/UL (ref 3.9–12.7)
WBC #/AREA URNS HPF: 2 /HPF (ref 0–5)

## 2022-10-05 PROCEDURE — 36415 COLL VENOUS BLD VENIPUNCTURE: CPT | Performed by: INTERNAL MEDICINE

## 2022-10-05 PROCEDURE — 86901 BLOOD TYPING SEROLOGIC RH(D): CPT | Performed by: EMERGENCY MEDICINE

## 2022-10-05 PROCEDURE — 99285 EMERGENCY DEPT VISIT HI MDM: CPT | Mod: 25

## 2022-10-05 PROCEDURE — 84295 ASSAY OF SERUM SODIUM: CPT | Performed by: INTERNAL MEDICINE

## 2022-10-05 PROCEDURE — 84484 ASSAY OF TROPONIN QUANT: CPT | Mod: 91 | Performed by: INTERNAL MEDICINE

## 2022-10-05 PROCEDURE — 86880 COOMBS TEST DIRECT: CPT | Performed by: EMERGENCY MEDICINE

## 2022-10-05 PROCEDURE — 93005 ELECTROCARDIOGRAM TRACING: CPT

## 2022-10-05 PROCEDURE — 86870 RBC ANTIBODY IDENTIFICATION: CPT | Performed by: EMERGENCY MEDICINE

## 2022-10-05 PROCEDURE — 25000003 PHARM REV CODE 250: Performed by: EMERGENCY MEDICINE

## 2022-10-05 PROCEDURE — 83880 ASSAY OF NATRIURETIC PEPTIDE: CPT | Performed by: EMERGENCY MEDICINE

## 2022-10-05 PROCEDURE — 86922 COMPATIBILITY TEST ANTIGLOB: CPT | Performed by: NURSE PRACTITIONER

## 2022-10-05 PROCEDURE — 63600175 PHARM REV CODE 636 W HCPCS: Performed by: INTERNAL MEDICINE

## 2022-10-05 PROCEDURE — 82077 ASSAY SPEC XCP UR&BREATH IA: CPT | Performed by: INTERNAL MEDICINE

## 2022-10-05 PROCEDURE — U0002 COVID-19 LAB TEST NON-CDC: HCPCS | Performed by: EMERGENCY MEDICINE

## 2022-10-05 PROCEDURE — 84156 ASSAY OF PROTEIN URINE: CPT | Performed by: INTERNAL MEDICINE

## 2022-10-05 PROCEDURE — 82550 ASSAY OF CK (CPK): CPT | Performed by: EMERGENCY MEDICINE

## 2022-10-05 PROCEDURE — 85025 COMPLETE CBC W/AUTO DIFF WBC: CPT | Performed by: EMERGENCY MEDICINE

## 2022-10-05 PROCEDURE — 84484 ASSAY OF TROPONIN QUANT: CPT | Performed by: EMERGENCY MEDICINE

## 2022-10-05 PROCEDURE — G0378 HOSPITAL OBSERVATION PER HR: HCPCS

## 2022-10-05 PROCEDURE — 86850 RBC ANTIBODY SCREEN: CPT | Performed by: EMERGENCY MEDICINE

## 2022-10-05 PROCEDURE — 84443 ASSAY THYROID STIM HORMONE: CPT | Performed by: EMERGENCY MEDICINE

## 2022-10-05 PROCEDURE — 80053 COMPREHEN METABOLIC PANEL: CPT | Performed by: EMERGENCY MEDICINE

## 2022-10-05 PROCEDURE — 87502 INFLUENZA DNA AMP PROBE: CPT | Performed by: EMERGENCY MEDICINE

## 2022-10-05 PROCEDURE — 84300 ASSAY OF URINE SODIUM: CPT | Performed by: INTERNAL MEDICINE

## 2022-10-05 PROCEDURE — 81000 URINALYSIS NONAUTO W/SCOPE: CPT | Mod: 59 | Performed by: EMERGENCY MEDICINE

## 2022-10-05 PROCEDURE — 93010 ELECTROCARDIOGRAM REPORT: CPT | Mod: ,,, | Performed by: INTERNAL MEDICINE

## 2022-10-05 PROCEDURE — 83735 ASSAY OF MAGNESIUM: CPT | Performed by: EMERGENCY MEDICINE

## 2022-10-05 PROCEDURE — 25000003 PHARM REV CODE 250: Performed by: INTERNAL MEDICINE

## 2022-10-05 PROCEDURE — 83930 ASSAY OF BLOOD OSMOLALITY: CPT | Performed by: INTERNAL MEDICINE

## 2022-10-05 PROCEDURE — 80307 DRUG TEST PRSMV CHEM ANLYZR: CPT | Performed by: EMERGENCY MEDICINE

## 2022-10-05 PROCEDURE — 93010 EKG 12-LEAD: ICD-10-PCS | Mod: ,,, | Performed by: INTERNAL MEDICINE

## 2022-10-05 RX ORDER — ONDANSETRON 8 MG/1
8 TABLET, ORALLY DISINTEGRATING ORAL EVERY 8 HOURS PRN
Status: DISCONTINUED | OUTPATIENT
Start: 2022-10-05 | End: 2022-10-12 | Stop reason: HOSPADM

## 2022-10-05 RX ORDER — IBUPROFEN 200 MG
24 TABLET ORAL
Status: DISCONTINUED | OUTPATIENT
Start: 2022-10-05 | End: 2022-10-12 | Stop reason: HOSPADM

## 2022-10-05 RX ORDER — DIAZEPAM 10 MG/2ML
2.5 INJECTION INTRAMUSCULAR ONCE
Status: COMPLETED | OUTPATIENT
Start: 2022-10-05 | End: 2022-10-05

## 2022-10-05 RX ORDER — SIMETHICONE 80 MG
1 TABLET,CHEWABLE ORAL 4 TIMES DAILY PRN
Status: DISCONTINUED | OUTPATIENT
Start: 2022-10-05 | End: 2022-10-12 | Stop reason: HOSPADM

## 2022-10-05 RX ORDER — SODIUM CHLORIDE, SODIUM LACTATE, POTASSIUM CHLORIDE, CALCIUM CHLORIDE 600; 310; 30; 20 MG/100ML; MG/100ML; MG/100ML; MG/100ML
INJECTION, SOLUTION INTRAVENOUS CONTINUOUS
Status: DISCONTINUED | OUTPATIENT
Start: 2022-10-05 | End: 2022-10-06

## 2022-10-05 RX ORDER — BISACODYL 10 MG
10 SUPPOSITORY, RECTAL RECTAL DAILY PRN
Status: DISCONTINUED | OUTPATIENT
Start: 2022-10-05 | End: 2022-10-12 | Stop reason: HOSPADM

## 2022-10-05 RX ORDER — ACETAMINOPHEN 325 MG/1
650 TABLET ORAL EVERY 8 HOURS PRN
Status: DISCONTINUED | OUTPATIENT
Start: 2022-10-05 | End: 2022-10-12 | Stop reason: HOSPADM

## 2022-10-05 RX ORDER — GLUCAGON 1 MG
1 KIT INJECTION
Status: DISCONTINUED | OUTPATIENT
Start: 2022-10-05 | End: 2022-10-12 | Stop reason: HOSPADM

## 2022-10-05 RX ORDER — IPRATROPIUM BROMIDE AND ALBUTEROL SULFATE 2.5; .5 MG/3ML; MG/3ML
3 SOLUTION RESPIRATORY (INHALATION) EVERY 4 HOURS PRN
Status: DISCONTINUED | OUTPATIENT
Start: 2022-10-05 | End: 2022-10-12 | Stop reason: HOSPADM

## 2022-10-05 RX ORDER — HYDROCODONE BITARTRATE AND ACETAMINOPHEN 5; 325 MG/1; MG/1
1 TABLET ORAL EVERY 6 HOURS PRN
Status: DISCONTINUED | OUTPATIENT
Start: 2022-10-05 | End: 2022-10-09

## 2022-10-05 RX ORDER — NALOXONE HCL 0.4 MG/ML
0.02 VIAL (ML) INJECTION
Status: DISCONTINUED | OUTPATIENT
Start: 2022-10-05 | End: 2022-10-12 | Stop reason: HOSPADM

## 2022-10-05 RX ORDER — PROCHLORPERAZINE EDISYLATE 5 MG/ML
5 INJECTION INTRAMUSCULAR; INTRAVENOUS EVERY 6 HOURS PRN
Status: DISCONTINUED | OUTPATIENT
Start: 2022-10-05 | End: 2022-10-12 | Stop reason: HOSPADM

## 2022-10-05 RX ORDER — LORAZEPAM 2 MG/ML
2 INJECTION INTRAMUSCULAR EVERY 4 HOURS PRN
Status: DISCONTINUED | OUTPATIENT
Start: 2022-10-05 | End: 2022-10-06

## 2022-10-05 RX ORDER — FOLIC ACID 1 MG/1
1 TABLET ORAL DAILY
Status: DISCONTINUED | OUTPATIENT
Start: 2022-10-06 | End: 2022-10-12 | Stop reason: HOSPADM

## 2022-10-05 RX ORDER — TALC
6 POWDER (GRAM) TOPICAL NIGHTLY PRN
Status: DISCONTINUED | OUTPATIENT
Start: 2022-10-05 | End: 2022-10-12 | Stop reason: HOSPADM

## 2022-10-05 RX ORDER — SODIUM CHLORIDE 0.9 % (FLUSH) 0.9 %
2 SYRINGE (ML) INJECTION EVERY 12 HOURS PRN
Status: DISCONTINUED | OUTPATIENT
Start: 2022-10-05 | End: 2022-10-12 | Stop reason: HOSPADM

## 2022-10-05 RX ORDER — THIAMINE HCL 100 MG
100 TABLET ORAL DAILY
Status: DISCONTINUED | OUTPATIENT
Start: 2022-10-06 | End: 2022-10-11

## 2022-10-05 RX ORDER — POLYETHYLENE GLYCOL 3350 17 G/17G
17 POWDER, FOR SOLUTION ORAL 3 TIMES DAILY PRN
Status: DISCONTINUED | OUTPATIENT
Start: 2022-10-05 | End: 2022-10-12 | Stop reason: HOSPADM

## 2022-10-05 RX ORDER — IBUPROFEN 200 MG
16 TABLET ORAL
Status: DISCONTINUED | OUTPATIENT
Start: 2022-10-05 | End: 2022-10-12 | Stop reason: HOSPADM

## 2022-10-05 RX ADMIN — SODIUM CHLORIDE, SODIUM LACTATE, POTASSIUM CHLORIDE, AND CALCIUM CHLORIDE: .6; .31; .03; .02 INJECTION, SOLUTION INTRAVENOUS at 09:10

## 2022-10-05 RX ADMIN — SODIUM CHLORIDE 1000 ML: 0.9 INJECTION, SOLUTION INTRAVENOUS at 03:10

## 2022-10-05 RX ADMIN — DIAZEPAM 2.5 MG: 10 INJECTION, SOLUTION INTRAMUSCULAR; INTRAVENOUS at 06:10

## 2022-10-05 RX ADMIN — MELATONIN TAB 3 MG 6 MG: 3 TAB at 09:10

## 2022-10-05 NOTE — ED PROVIDER NOTES
Encounter Date: 10/5/2022       History     Chief Complaint   Patient presents with    Altered Mental Status     Pt was found in hotel bathroom on the floor covered in urine and feces. Pt is alert and oriented to person and place but not time.      HPI  71-year-old male, homeless, unknown medical history, brought to the ED by EMS for altered mental status.  Patient is confused about his situation he alert and oriented to self and place.  Per nursing chart, patient was found in the hotel bathroom on the floor, covered in urine and feces.  Patient doesn't remember leading events, only know that he might have a fall and woke up in a hotel bed then EMS brought him in here. Reports shortness of breath, bilaterally lower extremities weakness, unable to move them without assistant.  Denies chest pain, abdominal pain, nausea/vomiting, no alcohol or drug uses.  Review of patient's allergies indicates:  No Known Allergies  No past medical history on file.  No past surgical history on file.  No family history on file.     Review of Systems   Constitutional:  Negative for chills and fever.   HENT:  Negative for congestion and sore throat.    Eyes:  Negative for pain and visual disturbance.   Respiratory:  Positive for shortness of breath. Negative for cough.    Cardiovascular:  Negative for chest pain.   Gastrointestinal:  Negative for abdominal pain, nausea and vomiting.   Genitourinary:  Negative for dysuria and flank pain.   Musculoskeletal:  Negative for back pain and neck pain.   Skin:  Negative for rash.   Neurological:  Negative for weakness and headaches.   Psychiatric/Behavioral:  Negative for confusion and decreased concentration.      Physical Exam     Initial Vitals [10/05/22 1148]   BP Pulse Resp Temp SpO2   (!) 200/160 109 17 98.7 °F (37.1 °C) 100 %      MAP       --         Physical Exam    Nursing note and vitals reviewed.  Constitutional: He appears well-developed. No distress.   HENT:   Head: Normocephalic and  atraumatic.   Nose: Nose normal.   Dry mucous membrane    Eyes: Conjunctivae and EOM are normal. Pupils are equal, round, and reactive to light.   Neck: No JVD present.   Normal range of motion.  Cardiovascular:  Normal rate, regular rhythm and normal heart sounds.           Pulmonary/Chest: Breath sounds normal. No respiratory distress.   Abdominal: Abdomen is soft. He exhibits no distension. There is no abdominal tenderness.   Musculoskeletal:         General: Edema (BLE non-pitting edema, no erythema, or tenderness) present. No tenderness. Normal range of motion.      Cervical back: Normal range of motion.      Comments: BLE: decreased ROM, no neurovascular deficit.      Neurological: He is alert and oriented to person, place, and time. He has normal strength. No cranial nerve deficit.   Skin: Skin is warm and dry. Capillary refill takes less than 2 seconds.       ED Course   Procedures  Labs Reviewed   COMPREHENSIVE METABOLIC PANEL - Abnormal; Notable for the following components:       Result Value    Sodium 134 (*)     CO2 18 (*)     Glucose 147 (*)     BUN 51 (*)     Creatinine 1.6 (*)     Albumin 2.4 (*)     Total Bilirubin 2.6 (*)     AST 45 (*)     eGFR 45 (*)     All other components within normal limits   URINALYSIS, REFLEX TO URINE CULTURE - Abnormal; Notable for the following components:    Protein, UA 1+ (*)     Urobilinogen, UA 4.0-6.0 (*)     All other components within normal limits    Narrative:     Specimen Source->Urine   CBC W/ AUTO DIFFERENTIAL - Abnormal; Notable for the following components:    RBC 2.71 (*)     Hemoglobin 7.9 (*)     Hematocrit 23.0 (*)     RDW 15.8 (*)     Gran % 81.0 (*)     Lymph % 9.0 (*)     Platelet Estimate Clumped (*)     All other components within normal limits   TROPONIN I - Abnormal; Notable for the following components:    Troponin I 0.027 (*)     All other components within normal limits   INDIRECT ANTIGLOBULIN TEST - Abnormal; Notable for the following  components:    Antibody Screen POS (*)     All other components within normal limits   INFLUENZA A & B BY MOLECULAR   TSH   MAGNESIUM   DRUG SCREEN PANEL, URINE EMERGENCY    Narrative:     Specimen Source->Urine   SARS-COV-2 RNA AMPLIFICATION, QUAL   TROPONIN I   B-TYPE NATRIURETIC PEPTIDE   CK   URINALYSIS MICROSCOPIC    Narrative:     Specimen Source->Urine   GROUP & RH   DIRECT ANTIGLOBULIN TEST   ANTIBODY IDENTIFICATION   POCT GLUCOSE MONITORING CONTINUOUS        ECG Results              EKG 12-lead (In process)  Result time 10/06/22 08:11:16      In process by Interface, Lab In [facility] (10/06/22 08:11:16)                   Narrative:    Test Reason : R41.82,    Vent. Rate : 106 BPM     Atrial Rate : 106 BPM     P-R Int : 160 ms          QRS Dur : 140 ms      QT Int : 368 ms       P-R-T Axes : 073 268 048 degrees     QTc Int : 488 ms    Sinus tachycardia  Right bundle branch block  Abnormal ECG  When compared with ECG of 26-SEP-2022 11:36,  Premature atrial complexes are no longer Present  Questionable change in QRS duration    Referred By: [email or web address]   SELF           Confirmed By:                                   Imaging Results              MRI Brain Without Contrast (Final result)  Result time 10/05/22 18:42:59      Final result by Mayank Ontiveros DO (10/05/22 18:42:59)                   Impression:      No acute intracranial abnormality.    Left frontoparietal encephalomalacia.      Electronically signed by: Mayank Ontiveros  Date:    10/05/2022  Time:    18:42               Narrative:    EXAMINATION:  MRI BRAIN WITHOUT CONTRAST    CLINICAL HISTORY:  AMS;    TECHNIQUE:  Multiplanar multisequence MR imaging of the brain was performed without intravenous contrast.    COMPARISON:  CT head from earlier the same date.    FINDINGS:  There is no evidence of restricted diffusion to suggest an acute infarction.    Ventricles are normal in size for age without evidence of hydrocephalus.  There is left frontoparietal  encephalomalacia, likely related to a remote infarction.  There are calcifications along the posterior falx.  There are T2/FLAIR hyperintensities in the supratentorial white matter, compatible with mild chronic microvascular ischemic changes.  No mass, hemorrhage, or recent or remote major vascular distribution infarct.  No extra-axial blood or fluid collections. Normal vascular flow voids.    Bone marrow signal intensity is normal. Paranasal sinuses and mastoid air cells are clear.                                       X-Ray Abdomen AP 1 View (Final result)  Result time 10/05/22 17:28:21      Final result by Mayank Ontiveros DO (10/05/22 17:28:21)                   Impression:      Diffusely distended loops of small bowel suspicious for ileus.  Partial obstruction not excluded.    Left nephrolithiasis.    No metallic foreign body.      Electronically signed by: Mayank Ontiveros  Date:    10/05/2022  Time:    17:28               Narrative:    EXAMINATION:  XR ABDOMEN AP 1 VIEW    CLINICAL HISTORY:  r/o metal for mri;    TECHNIQUE:  AP View(s) of the abdomen was performed.    COMPARISON:  CT abdomen and pelvis from 09/24/2022.    FINDINGS:  There are diffusely distended loops of small bowel suspicious for ileus.  Partial obstruction not entirely excluded.  Air and stool is seen within the large bowel and rectum.  Free air cannot be excluded on a supine film although none is definitively seen.  There are several calcifications in the left renal hilar region, concerning for nephrolithiasis.  There is no evidence of a metallic foreign body.  The visualized osseous structures demonstrate degenerative changes.                                       CT Head Without Contrast (Final result)  Result time 10/05/22 14:39:19      Final result by Aniceto Bob MD (10/05/22 14:39:19)                   Impression:      1. No acute intracranial findings.  2. Left frontoparietal encephalomalacia gliosis for which clinical  correlation recommended.      Electronically signed by: Aniceto Bob  Date:    10/05/2022  Time:    14:39               Narrative:    EXAMINATION:  CT HEAD WITHOUT CONTRAST    CLINICAL HISTORY:  Head trauma, minor (Age >= 65y);    TECHNIQUE:  Low dose axial images were obtained through the head.  Coronal and sagittal reformations were also performed. Contrast was not administered.    COMPARISON:  None.    FINDINGS:  Blood: No acute intracranial hemorrhage.    Parenchyma: No definite loss of gray-white differentiation to suggest acute or subacute transcortical infarct. Left frontal and parietal lobe encephalomalacia and gliosis.  Elsewhere, generalized pattern age-related volume loss.  Additional nonspecific areas of white matter hypoattenuation could relate to sequela of chronic small vessel ischemic disease.    Ventricles/Extra-axial spaces: No abnormal extra-axial fluid collection. Basal cisterns are patent.    Vessels: Atherosclerosis    Orbits: Grossly unremarkable.    Scalp: Question left posterolateral and right posterior scalp soft tissue contusions.    Skull: There are no depressed skull fractures or destructive bone lesions.    Sinuses and mastoids: Scattered relatively minor paranasal sinus mucosal thickening retention cysts.    Other findings: None                                       X-Ray Chest 1 View (Final result)  Result time 10/05/22 13:44:36      Final result by Dyllan Cardenas MD (10/05/22 13:44:36)                   Impression:      See above      Electronically signed by: Dyllan Cardenas MD  Date:    10/05/2022  Time:    13:44               Narrative:    EXAMINATION:  XR CHEST 1 VIEW    CLINICAL HISTORY:  Shortness of breath    TECHNIQUE:  Single frontal view of the chest was performed.    COMPARISON:  09/26/2022    FINDINGS:  Cardiac size remains normal.  Interstitial markings in the lungs have cleared some over the last 2 weeks although there is still slightly prominent.  No pleural  effusion is seen.                                       X-Ray Pelvis Routine AP (Final result)  Result time 10/05/22 13:45:20      Final result by Dyllan Cardenas MD (10/05/22 13:45:20)                   Impression:      See above      Electronically signed by: Dyllan Cardenas MD  Date:    10/05/2022  Time:    13:45               Narrative:    EXAMINATION:  XR PELVIS ROUTINE AP    CLINICAL HISTORY:  fall;    TECHNIQUE:  AP view of the pelvis was performed.    COMPARISON:  None.    FINDINGS:  Bones of the pelvis and hip appear to be intact.  No obvious fractures seen.                                       Medications   sodium chloride 0.9% flush 2 mL (has no administration in time range)   albuterol-ipratropium 2.5 mg-0.5 mg/3 mL nebulizer solution 3 mL (has no administration in time range)   melatonin tablet 6 mg (6 mg Oral Given 10/5/22 2107)   ondansetron disintegrating tablet 8 mg (8 mg Oral Given 10/6/22 1506)   prochlorperazine injection Soln 5 mg (has no administration in time range)   polyethylene glycol packet 17 g (has no administration in time range)   bisacodyL suppository 10 mg (has no administration in time range)   acetaminophen tablet 650 mg (has no administration in time range)   simethicone chewable tablet 80 mg (has no administration in time range)   HYDROcodone-acetaminophen 5-325 mg per tablet 1 tablet (has no administration in time range)   naloxone 0.4 mg/mL injection 0.02 mg (has no administration in time range)   glucose chewable tablet 16 g (has no administration in time range)   glucose chewable tablet 24 g (has no administration in time range)   glucagon (human recombinant) injection 1 mg (has no administration in time range)   dextrose 10% bolus 125 mL (has no administration in time range)   dextrose 10% bolus 250 mL (has no administration in time range)   folic acid tablet 1 mg (1 mg Oral Not Given 10/6/22 0858)   multivitamin tablet (1 tablet Oral Not Given 10/6/22 0858)    thiamine tablet 100 mg (100 mg Oral Not Given 10/6/22 0805)   LORazepam injection 1 mg (has no administration in time range)   lactated ringers infusion ( Intravenous New Bag 10/6/22 0900)   0.9%  NaCl infusion (for blood administration) (has no administration in time range)   sodium chloride 0.9% bolus 1,000 mL (0 mLs Intravenous Stopped 10/5/22 2109)   diazePAM injection 2.5 mg (2.5 mg Intravenous Given 10/5/22 1845)     Medical Decision Making:   Initial Assessment:   71-year-old male, presents to the ED for a fall with AMS.  Patient is confused but alert and oriented to self and place. No respiratory distress. Vital signs with hypertension and tachycardia, no hypoxia or fever.   From chart review, was recently discharged from the hospital on September 26 for shortness of breath.  His workup was significant for anemia, required transfusion, found to have diffuse lymphadenopathy on imaging, concerning for lymphedema, will require I by, patient was schedule follow-up with hematology oncology in the clinic. Patient was discharged to a hotel.   Differential Diagnosis:   Intracranial hemorrhage, anemia, malignancy, rhabdomyolysis, dehydration, pneumonia, CHF, pelvis injury.   Clinical Tests:   Lab Tests: Ordered and Reviewed  Radiological Study: Ordered and Reviewed  Medical Tests: Ordered and Reviewed          Attending Attestation:   Physician Attestation Statement for Resident:  As the supervising MD   Physician Attestation Statement: I have personally seen and examined this patient.   I agree with the above history.  -:   As the supervising MD I agree with the above PE.     As the supervising MD I agree with the above treatment, course, plan, and disposition.   - with the following exceptions:  I was personally present during the entire procedure.  I have reviewed and agree with the residents interpretation of the following: lab data, x-rays, CT scans, EKG and rhythm strips.  I have reviewed the following: old  records at this facility, old x-rays, old CTs, old EKGs, EKG reports, x-ray reports and CT reports.              ED Course as of 10/06/22 1807   Wed Oct 05, 2022   1510 WBC: 5.89 [NC]   1510 Hemoglobin(!): 7.9 [NC]   1510 Platelets: SEE COMMENT [NC]   1510 Sodium(!): 134 [NC]   1510 Potassium: 4.5 [NC]   1510 Glucose(!): 147 [NC]   1510 BUN(!): 51 [NC]   1510 Creatinine(!): 1.6  FRANK [NC]   1605 Consulted Hospital Medicine, they will admit for FRANK, neurology/cardiology follow up, PT/OT evaluation and  consultation for discharge planning [NC]      ED Course User Index  [NC] Los Wagner MD                 Clinical Impression:   Final diagnoses:  [R41.82] AMS (altered mental status)  [R06.02] Shortness of breath  [R55] Syncope, cardiogenic  [N17.9] FRANK (acute kidney injury) (Primary)        ED Disposition Condition    Observation                 Los Wagner MD  Resident  10/05/22 1612       Juliocesar Navas MD  10/06/22 1807

## 2022-10-05 NOTE — ASSESSMENT & PLAN NOTE
Patient with acute kidney injury likely due to IVVD/dehydration   Order urine stuides  IVF  Repeat chem  Limit nephrotoxins

## 2022-10-05 NOTE — HPI
71-year-old male, homeless, unknown medical history, brought to the ED by EMS for altered mental status.  Patient is confused about his situation he alert and oriented to self and place.  Per nursing chart, patient was found in the hotel bathroom on the floor, covered in urine and feces.  Patient doesn't remember leading events, denied chest pain or dizziness prior to, no n/v. Says that he might have a fallen and woke up in a hotel bed then EMS brought him in here. Reports shortness of breath, bilaterally lower extremities weakness, unable to move them without assistant.  Denies chest pain, abdominal pain, nausea/vomiting, no alcohol or drug uses. XOCHILT CATES MD.

## 2022-10-05 NOTE — H&P
[facility] Medicine  History & Physical    Patient Name: Author Rafael Lawson  MRN: [id number]  Patient Class: OP- Observation  Admission Date: 10/5/2022  Attending Physician: Lukas Linares MD   Primary Care Provider: No primary care provider on file.         Patient information was obtained from past medical records and ER records.     Subjective:     Principal Problem:Syncope, cardiogenic    Chief Complaint:   Chief Complaint   Patient presents with    Altered Mental Status     Pt was found in hotel bathroom on the floor covered in urine and feces. Pt is alert and oriented to person and place but not time.         HPI: 71-year-old male, homeless, unknown medical history, brought to the ED by EMS for altered mental status.  Patient is confused about his situation he alert and oriented to self and place.  Per nursing chart, patient was found in the hotel bathroom on the floor, covered in urine and feces.  Patient doesn't remember leading events, denied chest pain or dizziness prior to, no n/v. Says that he might have a fallen and woke up in a hotel bed then EMS brought him in here. Reports shortness of breath, bilaterally lower extremities weakness, unable to move them without assistant.  Denies chest pain, abdominal pain, nausea/vomiting, no alcohol or drug uses. [facility] ER MD.         No past medical history on file.    No past surgical history on file.    Review of patient's allergies indicates:  No Known Allergies    No current facility-administered medications on file prior to encounter.     Current Outpatient Medications on File Prior to Encounter   Medication Sig    diclofenac sodium (VOLTAREN) 1 % Gel Apply 4 g topically once daily.     Family History    None       Tobacco Use    Smoking status: Not on file    Smokeless tobacco: Not on file   Substance and Sexual Activity    Alcohol use: Not on file    Drug use: Not on file    Sexual activity: Not on file       Review of  Systems:  Unable to obtain due to AMS      Objective:     Vital Signs (Most Recent):  Temp: 98.7 °F (37.1 °C) (10/05/22 1148)  Pulse: 103 (10/05/22 1452)  Resp: 17 (10/05/22 1148)  BP: 120/70 (10/05/22 1452)  SpO2: 99 % (10/05/22 1452) Vital Signs (24h Range):  Temp:  [98.7 °F (37.1 °C)] 98.7 °F (37.1 °C)  Pulse:  [103-109] 103  Resp:  [17] 17  SpO2:  [99 %-100 %] 99 %  BP: (120-200)/() 120/70     Weight: 113.4 kg (250 lb)  Body mass index is 32.98 kg/m².    Physical Exam:  GEN:  No acute distress, in bed appears disheveled    HEENT:  Normocephalic, atraumatic, extra ocular movements intact mucous membranes sig dry  NECK:  Supple, good range of motion  RESPIRATORY:  Clear to auscultation, symmetrical chest rise  CARDIOVASCULAR:  Tachycardic, +s1/2  GI:  Soft, nontender, bowel sounds decreased  : No flank tenderness, normal genitalia  EXT: No edema, pulses palpated  SKIN: Warm, dry,   MUSCULOSKELETAL: Good range of motion, no apparent atrophy  NEURO:  Alert, answers some simple questions appropriately but mostly confused seems to confabulate   PSYCH:  Mood & affect appropriate, pleasant    Significant Labs:    A1C: No results for input(s): HGBA1C in the last 24 hours.  Blood Culture: No results for input(s): LABBLOO in the last 24 hours.  BMP/CMP:   Recent Labs   Lab 10/05/22  1305   *   K 4.5      CO2 18*   *   BUN 51*   CREATININE 1.6*   CALCIUM 9.7   PROT 7.5   ALBUMIN 2.4*   BILITOT 2.6*   ALKPHOS 133   AST 45*   ALT 42   ANIONGAP 13     CBC:   Recent Labs   Lab 10/05/22  1425   WBC 5.89   HGB 7.9*   HCT 23.0*   PLT SEE COMMENT     Cardiac Markers:   Recent Labs   Lab 10/05/22  1305   BNP 63     Coagulation: No results for input(s): PT, INR, APTT in the last 24 hours.  Lactic Acid: No results for input(s): LACTATE in the last 24 hours.  Lipid Panel: No results for input(s): CHOL, HDL, LDLCALC, TRIG, CHOLHDL in the last 24 hours.  Magnesium:   Recent Labs   Lab 10/05/22  1305   MG 2.4      POCT Glucose: No results for input(s): POCTGLUCOSE in the last 24 hours.  Troponin:   Recent Labs   Lab 10/05/22  1305   TROPONINI 0.019     TSH:   Recent Labs   Lab 10/05/22  1305   TSH 1.751     Urine Culture: No results for input(s): LABURIN in the last 24 hours.  Urine Studies:   Recent Labs   Lab 10/05/22  1451   COLORU Yellow   APPEARANCEUA Clear   PHUR 5.0   SPECGRAV 1.020   PROTEINUA 1+*   GLUCUA Negative   KETONESU Negative   BILIRUBINUA Negative   OCCULTUA Negative   NITRITE Negative   UROBILINOGEN 4.0-6.0*   LEUKOCYTESUR Negative   RBCUA 2   WBCUA 2   BACTERIA Rare   HYALINECASTS 0       Significant Imaging:     CT Head Without Contrast  Narrative: EXAMINATION:  CT HEAD WITHOUT CONTRAST    CLINICAL HISTORY:  Head trauma, minor (Age >= 65y);    TECHNIQUE:  Low dose axial images were obtained through the head.  Coronal and sagittal reformations were also performed. Contrast was not administered.    COMPARISON:  None.    FINDINGS:  Blood: No acute intracranial hemorrhage.    Parenchyma: No definite loss of gray-white differentiation to suggest acute or subacute transcortical infarct. Left frontal and parietal lobe encephalomalacia and gliosis.  Elsewhere, generalized pattern age-related volume loss.  Additional nonspecific areas of white matter hypoattenuation could relate to sequela of chronic small vessel ischemic disease.    Ventricles/Extra-axial spaces: No abnormal extra-axial fluid collection. Basal cisterns are patent.    Vessels: Atherosclerosis    Orbits: Grossly unremarkable.    Scalp: Question left posterolateral and right posterior scalp soft tissue contusions.    Skull: There are no depressed skull fractures or destructive bone lesions.    Sinuses and mastoids: Scattered relatively minor paranasal sinus mucosal thickening retention cysts.    Other findings: None  Impression: 1. No acute intracranial findings.  2. Left frontoparietal encephalomalacia gliosis for which clinical correlation  recommended.    Electronically signed by: Aniceto Bob  Date:    10/05/2022  Time:    14:39  X-Ray Pelvis Routine AP  Narrative: EXAMINATION:  XR PELVIS ROUTINE AP    CLINICAL HISTORY:  fall;    TECHNIQUE:  AP view of the pelvis was performed.    COMPARISON:  None.    FINDINGS:  Bones of the pelvis and hip appear to be intact.  No obvious fractures seen.  Impression: See above    Electronically signed by: Dyllan Cardenas MD  Date:    10/05/2022  Time:    13:45  X-Ray Chest 1 View  Narrative: EXAMINATION:  XR CHEST 1 VIEW    CLINICAL HISTORY:  Shortness of breath    TECHNIQUE:  Single frontal view of the chest was performed.    COMPARISON:  09/26/2022    FINDINGS:  Cardiac size remains normal.  Interstitial markings in the lungs have cleared some over the last 2 weeks although there is still slightly prominent.  No pleural effusion is seen.  Impression: See above    Electronically signed by: Dyllan Cardenas MD  Date:    10/05/2022  Time:    13:44            Assessment/Plan:     * Syncope, cardiogenic  Tele  Cycle CE  Had a recent echo 9/2022 HFpEF  Fall precautions     Discharge planning issues  Pt appeared disheveled at POC he's reported to be living in a hotel room and likely is abusing etoh.   May benefit from placement. SW for DC planning      Transaminitis  Mild elevation   AST elevated I suspect from etoh abuse   Repeat chem after ivf      Hyponatremia  ?beer potomania. Appears chronic do not suspect it's c/t his AMS  Mey and Osm  Serial Na while giving isotonic saline in case this is siadh which I highly doubt         FRANK (acute kidney injury)  Patient with acute kidney injury likely due to IVVD/dehydration   Order urine stuides  IVF  Repeat chem  Limit nephrotoxins     Intravascular volume depletion  Isotonic saline started   Monitor vol status       AMS (altered mental status)  Appears sig dehydrated but cannot tell us why he hasn't had a cup of water, he says in several days. Not sure of the validity  of this. I suspect Etoh abuse. Will order MRI w/o acute infarct. Neuro checks and neuro c/s  UDS and etoh level  PTOTSTSW      VTE Risk Mitigation (From admission, onward)         Ordered     IP VTE HIGH RISK PATIENT  Once         10/05/22 1609     Place sequential compression device  Until discontinued         10/05/22 1609                   Kevin Mcwilliams MD  Department of Hospital Medicine   [facility] - Emergency Dept

## 2022-10-05 NOTE — ASSESSMENT & PLAN NOTE
?beer potomania. Appears chronic do not suspect it's c/t his AMS  Mey and Osm  Serial Na while giving isotonic saline in case this is siadh which I highly doubt

## 2022-10-05 NOTE — ED NOTES
Lani patients friend called for update - [phone] - Lani spoke with Rain from [facility] staff   
Patient cleaned and linen changed. Patient stated with turning that he didn't care if he was dirty he didn't want to be turned. Mirapex applied to sacral wound stage 1.   
Spoke with [facility] staff (Shalonda [phone]) who found patient on hotel bathroom floor covered in feces and urine. She reports the patient has been staying at the hotel since August 21. She reports the patient will usually answer the phone in the room when she calls and got concerned that he did not answer today. She entered the room and found the patient under the sink in the bathroom. Shalonda reports she is attempting to contact family. She reports the patient has not been feeling well since last admission.   
Never

## 2022-10-05 NOTE — NURSING
PT arrived to floor AAOx2. Pt is unable to tell me what year he is in, but is oriented to self and place. Condom cath placed and skin assessed. Mepelex to sacrum, nonblanchable redness noted under dressing, and also to spine. Pt c/o being hungry and thirsty. Pt oriented to room and safety precautions initiated. Will continue to monitor.

## 2022-10-05 NOTE — SUBJECTIVE & OBJECTIVE
No past medical history on file.    No past surgical history on file.    Review of patient's allergies indicates:  No Known Allergies    No current facility-administered medications on file prior to encounter.     Current Outpatient Medications on File Prior to Encounter   Medication Sig    diclofenac sodium (VOLTAREN) 1 % Gel Apply 4 g topically once daily.     Family History    None       Tobacco Use    Smoking status: Not on file    Smokeless tobacco: Not on file   Substance and Sexual Activity    Alcohol use: Not on file    Drug use: Not on file    Sexual activity: Not on file       Review of Systems:  Unable to obtain due to AMS      Objective:     Vital Signs (Most Recent):  Temp: 98.7 °F (37.1 °C) (10/05/22 1148)  Pulse: 103 (10/05/22 1452)  Resp: 17 (10/05/22 1148)  BP: 120/70 (10/05/22 1452)  SpO2: 99 % (10/05/22 1452) Vital Signs (24h Range):  Temp:  [98.7 °F (37.1 °C)] 98.7 °F (37.1 °C)  Pulse:  [103-109] 103  Resp:  [17] 17  SpO2:  [99 %-100 %] 99 %  BP: (120-200)/() 120/70     Weight: 113.4 kg (250 lb)  Body mass index is 32.98 kg/m².    Physical Exam:  GEN:  No acute distress, in bed appears disheveled    HEENT:  Normocephalic, atraumatic, extra ocular movements intact mucous membranes sig dry  NECK:  Supple, good range of motion  RESPIRATORY:  Clear to auscultation, symmetrical chest rise  CARDIOVASCULAR:  Tachycardic, +s1/2  GI:  Soft, nontender, bowel sounds decreased  : No flank tenderness, normal genitalia  EXT: No edema, pulses palpated  SKIN: Warm, dry,   MUSCULOSKELETAL: Good range of motion, no apparent atrophy  NEURO:  Alert, answers some simple questions appropriately but mostly confused seems to confabulate   PSYCH:  Mood & affect appropriate, pleasant    Significant Labs:    A1C: No results for input(s): HGBA1C in the last 24 hours.  Blood Culture: No results for input(s): LABBLOO in the last 24 hours.  BMP/CMP:   Recent Labs   Lab 10/05/22  1305   *   K 4.5      CO2  18*   *   BUN 51*   CREATININE 1.6*   CALCIUM 9.7   PROT 7.5   ALBUMIN 2.4*   BILITOT 2.6*   ALKPHOS 133   AST 45*   ALT 42   ANIONGAP 13     CBC:   Recent Labs   Lab 10/05/22  1425   WBC 5.89   HGB 7.9*   HCT 23.0*   PLT SEE COMMENT     Cardiac Markers:   Recent Labs   Lab 10/05/22  1305   BNP 63     Coagulation: No results for input(s): PT, INR, APTT in the last 24 hours.  Lactic Acid: No results for input(s): LACTATE in the last 24 hours.  Lipid Panel: No results for input(s): CHOL, HDL, LDLCALC, TRIG, CHOLHDL in the last 24 hours.  Magnesium:   Recent Labs   Lab 10/05/22  1305   MG 2.4     POCT Glucose: No results for input(s): POCTGLUCOSE in the last 24 hours.  Troponin:   Recent Labs   Lab 10/05/22  1305   TROPONINI 0.019     TSH:   Recent Labs   Lab 10/05/22  1305   TSH 1.751     Urine Culture: No results for input(s): LABURIN in the last 24 hours.  Urine Studies:   Recent Labs   Lab 10/05/22  1451   COLORU Yellow   APPEARANCEUA Clear   PHUR 5.0   SPECGRAV 1.020   PROTEINUA 1+*   GLUCUA Negative   KETONESU Negative   BILIRUBINUA Negative   OCCULTUA Negative   NITRITE Negative   UROBILINOGEN 4.0-6.0*   LEUKOCYTESUR Negative   RBCUA 2   WBCUA 2   BACTERIA Rare   HYALINECASTS 0       Significant Imaging:     CT Head Without Contrast  Narrative: EXAMINATION:  CT HEAD WITHOUT CONTRAST    CLINICAL HISTORY:  Head trauma, minor (Age >= 65y);    TECHNIQUE:  Low dose axial images were obtained through the head.  Coronal and sagittal reformations were also performed. Contrast was not administered.    COMPARISON:  None.    FINDINGS:  Blood: No acute intracranial hemorrhage.    Parenchyma: No definite loss of gray-white differentiation to suggest acute or subacute transcortical infarct. Left frontal and parietal lobe encephalomalacia and gliosis.  Elsewhere, generalized pattern age-related volume loss.  Additional nonspecific areas of white matter hypoattenuation could relate to sequela of chronic small vessel  ischemic disease.    Ventricles/Extra-axial spaces: No abnormal extra-axial fluid collection. Basal cisterns are patent.    Vessels: Atherosclerosis    Orbits: Grossly unremarkable.    Scalp: Question left posterolateral and right posterior scalp soft tissue contusions.    Skull: There are no depressed skull fractures or destructive bone lesions.    Sinuses and mastoids: Scattered relatively minor paranasal sinus mucosal thickening retention cysts.    Other findings: None  Impression: 1. No acute intracranial findings.  2. Left frontoparietal encephalomalacia gliosis for which clinical correlation recommended.    Electronically signed by: Aniceto Bob  Date:    10/05/2022  Time:    14:39  X-Ray Pelvis Routine AP  Narrative: EXAMINATION:  XR PELVIS ROUTINE AP    CLINICAL HISTORY:  fall;    TECHNIQUE:  AP view of the pelvis was performed.    COMPARISON:  None.    FINDINGS:  Bones of the pelvis and hip appear to be intact.  No obvious fractures seen.  Impression: See above    Electronically signed by: Dyllan Cardenas MD  Date:    10/05/2022  Time:    13:45  X-Ray Chest 1 View  Narrative: EXAMINATION:  XR CHEST 1 VIEW    CLINICAL HISTORY:  Shortness of breath    TECHNIQUE:  Single frontal view of the chest was performed.    COMPARISON:  09/26/2022    FINDINGS:  Cardiac size remains normal.  Interstitial markings in the lungs have cleared some over the last 2 weeks although there is still slightly prominent.  No pleural effusion is seen.  Impression: See above    Electronically signed by: Dyllan Cardenas MD  Date:    10/05/2022  Time:    13:44

## 2022-10-05 NOTE — ASSESSMENT & PLAN NOTE
Appears sig dehydrated but cannot tell us why he hasn't had a cup of water, he says in several days. Not sure of the validity of this. I suspect Etoh abuse. Will order MRI w/o acute infarct. Neuro checks and neuro c/s  UDS and etoh level  PTOTSTSW

## 2022-10-05 NOTE — PHARMACY MED REC
"          [facility]           Pharmacy  Admission Medication History     The home medication history was taken by Maranda Hinton.      Medication history obtained from UNABLE TO ASSESS PATIENT    Based on information gathered for medication list, you may go to "Admission" then "Reconcile Home Medications" tabs to review and/or act upon those items.     The home medication list has been updated by the Pharmacy department.   Please read ALL comments highlighted in yellow.   Please address this information as you see fit.    Feel free to contact us if you have any questions or require assistance.          No current facility-administered medications on file prior to encounter.     Current Outpatient Medications on File Prior to Encounter   Medication Sig Dispense Refill    diclofenac sodium (VOLTAREN) 1 % Gel Apply 4 g topically once daily. 50 g 3       Please address this information as you see fit.  Feel free to contact us if you have any questions or require assistance.    Maranda Hinton  [phone]        .        "

## 2022-10-05 NOTE — ASSESSMENT & PLAN NOTE
Pt appeared disheveled at POC he's reported to be living in a hotel room and likely is abusing etoh.   May benefit from placement. SW for DC planning

## 2022-10-05 NOTE — NURSING
VN cued into the room to attempt to complete the admission assessment, staff changing the patient and nurse assessing the pt, request VN return after care is performed.

## 2022-10-06 LAB
ALBUMIN SERPL BCP-MCNC: 2.1 G/DL (ref 3.5–5.2)
ALLENS TEST: ABNORMAL
ALP SERPL-CCNC: 118 U/L (ref 55–135)
ALT SERPL W/O P-5'-P-CCNC: 33 U/L (ref 10–44)
AMMONIA PLAS-SCNC: 40 UMOL/L (ref 10–50)
ANION GAP SERPL CALC-SCNC: 11 MMOL/L (ref 8–16)
AST SERPL-CCNC: 37 U/L (ref 10–40)
BASOPHILS NFR BLD: 0 % (ref 0–1.9)
BILIRUB SERPL-MCNC: 2.4 MG/DL (ref 0.1–1)
BILIRUB UR QL STRIP: NEGATIVE
BUN SERPL-MCNC: 58 MG/DL (ref 8–23)
CALCIUM SERPL-MCNC: 8.9 MG/DL (ref 8.7–10.5)
CHLORIDE SERPL-SCNC: 109 MMOL/L (ref 95–110)
CLARITY UR: CLEAR
CO2 SERPL-SCNC: 20 MMOL/L (ref 23–29)
COLOR UR: YELLOW
CREAT SERPL-MCNC: 1.8 MG/DL (ref 0.5–1.4)
DELSYS: ABNORMAL
DIFFERENTIAL METHOD: ABNORMAL
EOSINOPHIL NFR BLD: 0 % (ref 0–8)
ERYTHROCYTE [DISTWIDTH] IN BLOOD BY AUTOMATED COUNT: 15.9 % (ref 11.5–14.5)
EST. GFR  (NO RACE VARIABLE): 39 ML/MIN/1.73 M^2
ETHANOL SERPL-MCNC: <10 MG/DL
FIO2: 21
GIANT PLATELETS BLD QL SMEAR: PRESENT
GLUCOSE SERPL-MCNC: 113 MG/DL (ref 70–110)
GLUCOSE UR QL STRIP: NEGATIVE
HCO3 UR-SCNC: 23 MMOL/L (ref 24–28)
HCT VFR BLD AUTO: 18 % (ref 40–54)
HGB BLD-MCNC: 6.1 G/DL (ref 14–18)
HGB UR QL STRIP: NEGATIVE
IMM GRANULOCYTES # BLD AUTO: ABNORMAL K/UL (ref 0–0.04)
IMM GRANULOCYTES NFR BLD AUTO: ABNORMAL % (ref 0–0.5)
KETONES UR QL STRIP: NEGATIVE
LEUKOCYTE ESTERASE UR QL STRIP: NEGATIVE
LYMPHOCYTES NFR BLD: 11 % (ref 18–48)
MAGNESIUM SERPL-MCNC: 2.4 MG/DL (ref 1.6–2.6)
MCH RBC QN AUTO: 28.8 PG (ref 27–31)
MCHC RBC AUTO-ENTMCNC: 33.9 G/DL (ref 32–36)
MCV RBC AUTO: 85 FL (ref 82–98)
MODE: ABNORMAL
MONOCYTES NFR BLD: 3 % (ref 4–15)
NEUTROPHILS NFR BLD: 83 % (ref 38–73)
NEUTS BAND NFR BLD MANUAL: 3 %
NITRITE UR QL STRIP: NEGATIVE
NRBC BLD-RTO: 0 /100 WBC
OSMOLALITY SERPL: 315 MOSM/KG (ref 280–300)
PCO2 BLDA: 32.9 MMHG (ref 35–45)
PH SMN: 7.45 [PH] (ref 7.35–7.45)
PH UR STRIP: 5 [PH] (ref 5–8)
PHOSPHATE SERPL-MCNC: 4.8 MG/DL (ref 2.7–4.5)
PLATELET # BLD AUTO: 60 K/UL (ref 150–450)
PLATELET BLD QL SMEAR: ABNORMAL
PMV BLD AUTO: 12.7 FL (ref 9.2–12.9)
PO2 BLDA: 70 MMHG (ref 80–100)
POC BE: -1 MMOL/L
POC SATURATED O2: 95 % (ref 95–100)
POC TCO2: 24 MMOL/L (ref 23–27)
POTASSIUM SERPL-SCNC: 3.9 MMOL/L (ref 3.5–5.1)
PROT SERPL-MCNC: 6.3 G/DL (ref 6–8.4)
PROT UR QL STRIP: ABNORMAL
RBC # BLD AUTO: 2.12 M/UL (ref 4.6–6.2)
RETICS/RBC NFR AUTO: 0.3 % (ref 0.4–2)
SAMPLE: ABNORMAL
SITE: ABNORMAL
SODIUM SERPL-SCNC: 138 MMOL/L (ref 136–145)
SODIUM SERPL-SCNC: 140 MMOL/L (ref 136–145)
SODIUM SERPL-SCNC: 140 MMOL/L (ref 136–145)
SODIUM SERPL-SCNC: 141 MMOL/L (ref 136–145)
SP GR UR STRIP: 1.02 (ref 1–1.03)
SP02: 96
TROPONIN I SERPL DL<=0.01 NG/ML-MCNC: 0.02 NG/ML (ref 0–0.03)
URN SPEC COLLECT METH UR: ABNORMAL
UROBILINOGEN UR STRIP-ACNC: ABNORMAL EU/DL
WBC # BLD AUTO: 3.85 K/UL (ref 3.9–12.7)

## 2022-10-06 PROCEDURE — 84100 ASSAY OF PHOSPHORUS: CPT | Performed by: INTERNAL MEDICINE

## 2022-10-06 PROCEDURE — P9021 RED BLOOD CELLS UNIT: HCPCS | Performed by: NURSE PRACTITIONER

## 2022-10-06 PROCEDURE — 85027 COMPLETE CBC AUTOMATED: CPT | Performed by: INTERNAL MEDICINE

## 2022-10-06 PROCEDURE — 99223 1ST HOSP IP/OBS HIGH 75: CPT | Mod: FS,,, | Performed by: INTERNAL MEDICINE

## 2022-10-06 PROCEDURE — 82140 ASSAY OF AMMONIA: CPT | Performed by: NURSE PRACTITIONER

## 2022-10-06 PROCEDURE — 99900035 HC TECH TIME PER 15 MIN (STAT)

## 2022-10-06 PROCEDURE — 87389 HIV-1 AG W/HIV-1&-2 AB AG IA: CPT | Performed by: NURSE PRACTITIONER

## 2022-10-06 PROCEDURE — 82077 ASSAY SPEC XCP UR&BREATH IA: CPT | Performed by: NURSE PRACTITIONER

## 2022-10-06 PROCEDURE — 99223 1ST HOSP IP/OBS HIGH 75: CPT | Mod: ,,, | Performed by: STUDENT IN AN ORGANIZED HEALTH CARE EDUCATION/TRAINING PROGRAM

## 2022-10-06 PROCEDURE — 99223 PR INITIAL HOSPITAL CARE,LEVL III: ICD-10-PCS | Mod: ,,, | Performed by: PSYCHIATRY & NEUROLOGY

## 2022-10-06 PROCEDURE — 83935 ASSAY OF URINE OSMOLALITY: CPT | Performed by: INTERNAL MEDICINE

## 2022-10-06 PROCEDURE — 99223 1ST HOSP IP/OBS HIGH 75: CPT | Mod: ,,, | Performed by: INTERNAL MEDICINE

## 2022-10-06 PROCEDURE — 99223 1ST HOSP IP/OBS HIGH 75: CPT | Mod: ,,, | Performed by: PSYCHIATRY & NEUROLOGY

## 2022-10-06 PROCEDURE — 85045 AUTOMATED RETICULOCYTE COUNT: CPT | Performed by: NURSE PRACTITIONER

## 2022-10-06 PROCEDURE — 63600175 PHARM REV CODE 636 W HCPCS: Performed by: NURSE PRACTITIONER

## 2022-10-06 PROCEDURE — 25000003 PHARM REV CODE 250: Performed by: INTERNAL MEDICINE

## 2022-10-06 PROCEDURE — 99223 PR INITIAL HOSPITAL CARE,LEVL III: ICD-10-PCS | Mod: ,,, | Performed by: STUDENT IN AN ORGANIZED HEALTH CARE EDUCATION/TRAINING PROGRAM

## 2022-10-06 PROCEDURE — 11000001 HC ACUTE MED/SURG PRIVATE ROOM

## 2022-10-06 PROCEDURE — 80053 COMPREHEN METABOLIC PANEL: CPT | Performed by: INTERNAL MEDICINE

## 2022-10-06 PROCEDURE — 36415 COLL VENOUS BLD VENIPUNCTURE: CPT | Performed by: INTERNAL MEDICINE

## 2022-10-06 PROCEDURE — 63600175 PHARM REV CODE 636 W HCPCS: Performed by: INTERNAL MEDICINE

## 2022-10-06 PROCEDURE — 99223 PR INITIAL HOSPITAL CARE,LEVL III: ICD-10-PCS | Mod: FS,,, | Performed by: INTERNAL MEDICINE

## 2022-10-06 PROCEDURE — 86592 SYPHILIS TEST NON-TREP QUAL: CPT | Performed by: NURSE PRACTITIONER

## 2022-10-06 PROCEDURE — 84484 ASSAY OF TROPONIN QUANT: CPT | Performed by: NURSE PRACTITIONER

## 2022-10-06 PROCEDURE — 36600 WITHDRAWAL OF ARTERIAL BLOOD: CPT

## 2022-10-06 PROCEDURE — 80321 ALCOHOLS BIOMARKERS 1OR 2: CPT | Performed by: NURSE PRACTITIONER

## 2022-10-06 PROCEDURE — 80307 DRUG TEST PRSMV CHEM ANLYZR: CPT | Performed by: NURSE PRACTITIONER

## 2022-10-06 PROCEDURE — 99223 PR INITIAL HOSPITAL CARE,LEVL III: ICD-10-PCS | Mod: ,,, | Performed by: INTERNAL MEDICINE

## 2022-10-06 PROCEDURE — 36430 TRANSFUSION BLD/BLD COMPNT: CPT

## 2022-10-06 PROCEDURE — 81003 URINALYSIS AUTO W/O SCOPE: CPT | Performed by: INTERNAL MEDICINE

## 2022-10-06 PROCEDURE — 36415 COLL VENOUS BLD VENIPUNCTURE: CPT | Performed by: NURSE PRACTITIONER

## 2022-10-06 PROCEDURE — 83735 ASSAY OF MAGNESIUM: CPT | Performed by: INTERNAL MEDICINE

## 2022-10-06 PROCEDURE — 84466 ASSAY OF TRANSFERRIN: CPT | Performed by: NURSE PRACTITIONER

## 2022-10-06 PROCEDURE — 84295 ASSAY OF SERUM SODIUM: CPT | Mod: 91 | Performed by: INTERNAL MEDICINE

## 2022-10-06 PROCEDURE — 85007 BL SMEAR W/DIFF WBC COUNT: CPT | Performed by: INTERNAL MEDICINE

## 2022-10-06 RX ORDER — SODIUM CHLORIDE, SODIUM LACTATE, POTASSIUM CHLORIDE, CALCIUM CHLORIDE 600; 310; 30; 20 MG/100ML; MG/100ML; MG/100ML; MG/100ML
INJECTION, SOLUTION INTRAVENOUS CONTINUOUS
Status: ACTIVE | OUTPATIENT
Start: 2022-10-06 | End: 2022-10-06

## 2022-10-06 RX ORDER — LORAZEPAM 2 MG/ML
1 INJECTION INTRAMUSCULAR EVERY 4 HOURS PRN
Status: DISCONTINUED | OUTPATIENT
Start: 2022-10-06 | End: 2022-10-09

## 2022-10-06 RX ORDER — HYDROCODONE BITARTRATE AND ACETAMINOPHEN 500; 5 MG/1; MG/1
TABLET ORAL
Status: DISCONTINUED | OUTPATIENT
Start: 2022-10-06 | End: 2022-10-12 | Stop reason: HOSPADM

## 2022-10-06 RX ADMIN — SODIUM CHLORIDE, SODIUM LACTATE, POTASSIUM CHLORIDE, AND CALCIUM CHLORIDE: .6; .31; .03; .02 INJECTION, SOLUTION INTRAVENOUS at 09:10

## 2022-10-06 RX ADMIN — LORAZEPAM 1 MG: 2 INJECTION INTRAMUSCULAR; INTRAVENOUS at 10:10

## 2022-10-06 RX ADMIN — ONDANSETRON 8 MG: 8 TABLET, ORALLY DISINTEGRATING ORAL at 03:10

## 2022-10-06 RX ADMIN — LORAZEPAM 2 MG: 2 INJECTION INTRAMUSCULAR; INTRAVENOUS at 12:10

## 2022-10-06 NOTE — ASSESSMENT & PLAN NOTE
Appears sig dehydrated but cannot tell us why he hasn't had a cup of water, he says in several days. Not sure of the validity of this. I suspect Etoh abuse.  MRI w/o acute infarct, but shows Left frontoparietal encephalomalacia. . Neuro checks and neuro c/s  UDS and etoh level  PT/OT/ST/SW  -neurology on board   - EEG to assess for nonepileptic seizure activity   -ammonia, RPR, HIV, ABG   -CIWA q 4 hours with PRN ativan if he is withdrawing from ETOH   -UA Pending   -will need CTA head when FRANK resolves

## 2022-10-06 NOTE — HPI
71 year-old male was admitted for syncope/encephalopathy/failure to thrive. Consult is for pancytopenia.

## 2022-10-06 NOTE — PT/OT/SLP PROGRESS
Physical Therapy Evaluation Attempt      Patient Name:  Author Rafael Lawson   MRN:  [id number]    Patient not seen today secondary to Patient fatigue, Other (Comment) (Pt asleep. Pt given medication for agitation per nsg. Low H&H also noted 6.1/18. Pt not appropriate to participate in therapy at this time). Will follow-up as able/appropriate.    10/6/2022

## 2022-10-06 NOTE — CONSULTS
[facility] - Telemetry  Cardiology  Consult Note    Patient Name: Author Rafael Lawson  MRN: [id number]  Admission Date: 10/5/2022  Hospital Length of Stay: 0 days  Code Status: Full Code   Attending Provider: Lukas Linares MD   Consulting Provider: Albert Haider NP  Primary Care Physician: No primary care provider on file.  Principal Problem:Syncope and collapse    Patient information was obtained from ER records.     [facility]  Consult performed by: Albert Haider NP  Consult ordered by: Sheree Palma NP        Subjective:     Chief Complaint:  AMS     HPI:   71-year-old male, homeless, pancytopenia, diffuse lymphadenopathy of unknown significance. Patient was brought to the ED by EMS for altered mental status.  Patient is noted difficult to arouse on exam.  Per chart review, patient was found in the hotel bathroom on the floor, covered in urine and feces.  Patient didn't remember leading events, denied chest pain or dizziness prior to, no n/v. Said that he might have a fallen and woke up in a hotel bed then EMS brought him in here.  Troponin mildly elevated and peaked on admission at 0.041  EKG ST; tele review SR/ST with frequent PACs  HH 6/18, plt 60K  Cr 1.8 (1.1 last week)   MRI brain- negative for acute findings   Abdominal XR- distended loops of small bowel suspicious for ileus.  Partial obstruction not entirely excluded.  Air and stool is seen within the large bowel and rectum.  Free air cannot be excluded on a supine film although none is definitively seen.  There are several calcifications in the left renal hilar region, concerning for nephrolithiasis  CXR and BNP unremarkable     TTE 09/24/2022   Concentric hypertrophy and normal systolic function.   The estimated ejection fraction is 60%.   Normal left ventricular diastolic function.   Normal right ventricular size with normal right ventricular systolic function.   Mild left atrial enlargement.   There is  mild aortic valve stenosis.   Aortic valve area is 2.05 cm2; peak velocity is 2.51 m/s; mean gradient is 15 mmHg.   Mild tricuspid regurgitation.   Intermediate central venous pressure (8 mmHg).   The estimated PA systolic pressure is 24 mmHg.   Trivial pericardial effusion.        No past medical history on file.    No past surgical history on file.    Review of patient's allergies indicates:  No Known Allergies    No current facility-administered medications on file prior to encounter.     Current Outpatient Medications on File Prior to Encounter   Medication Sig    diclofenac sodium (VOLTAREN) 1 % Gel Apply 4 g topically once daily.     Family History    None       Tobacco Use    Smoking status: Not on file    Smokeless tobacco: Not on file   Substance and Sexual Activity    Alcohol use: Not on file    Drug use: Not on file    Sexual activity: Not on file     Review of Systems   Unable to perform ROS: Mental status change   Objective:     Vital Signs (Most Recent):  Temp: 97.5 °F (36.4 °C) (10/06/22 1141)  Pulse: 93 (10/06/22 1205)  Resp: 14 (10/06/22 1141)  BP: (!) 106/56 (10/06/22 1141)  SpO2: 95 % (10/06/22 1205)   Vital Signs (24h Range):  Temp:  [96.6 °F (35.9 °C)-98.4 °F (36.9 °C)] 97.5 °F (36.4 °C)  Pulse:  [] 93  Resp:  [14-26] 14  SpO2:  [92 %-100 %] 95 %  BP: (104-126)/(56-70) 106/56     Weight: 113.4 kg (250 lb)  Body mass index is 32.98 kg/m².    SpO2: 95 %  O2 Device (Oxygen Therapy): room air      Intake/Output Summary (Last 24 hours) at 10/6/2022 1246  Last data filed at 10/6/2022 0652  Gross per 24 hour   Intake --   Output 600 ml   Net -600 ml       Lines/Drains/Airways       Peripheral Intravenous Line  Duration                  Peripheral IV - Single Lumen 10/05/22 1324 20 G;1 in Anterior;Distal;Right Forearm <1 day                    Physical Exam  Constitutional:       General: He is not in acute distress.  Eyes:      General:         Right eye: No discharge.         Left  eye: No discharge.   Cardiovascular:      Rate and Rhythm: Normal rate and regular rhythm.      Heart sounds: Murmur heard.   Pulmonary:      Effort: Pulmonary effort is normal.      Breath sounds: No rales.       Significant Labs: BMP:   Recent Labs   Lab 10/05/22  1305 10/05/22  1902 10/05/22  2211 10/06/22  0538   *  --   --  113*   * 137 138 140  140   K 4.5  --   --  3.9     --   --  109   CO2 18*  --   --  20*   BUN 51*  --   --  58*   CREATININE 1.6*  --   --  1.8*   CALCIUM 9.7  --   --  8.9   MG 2.4  --   --  2.4   , CMP   Recent Labs   Lab 10/05/22  1305 10/05/22  1902 10/05/22  2211 10/06/22  0538   * 137 138 140  140   K 4.5  --   --  3.9     --   --  109   CO2 18*  --   --  20*   *  --   --  113*   BUN 51*  --   --  58*   CREATININE 1.6*  --   --  1.8*   CALCIUM 9.7  --   --  8.9   PROT 7.5  --   --  6.3   ALBUMIN 2.4*  --   --  2.1*   BILITOT 2.6*  --   --  2.4*   ALKPHOS 133  --   --  118   AST 45*  --   --  37   ALT 42  --   --  33   ANIONGAP 13  --   --  11   , CBC   Recent Labs   Lab 10/05/22  1425 10/06/22  0538   WBC 5.89 3.85*   HGB 7.9* 6.1*   HCT 23.0* 18.0*   PLT SEE COMMENT 60*   , INR No results for input(s): INR, PROTIME in the last 48 hours., Lipid Panel No results for input(s): CHOL, HDL, LDLCALC, TRIG, CHOLHDL in the last 48 hours., Troponin   Recent Labs   Lab 10/05/22  1902 10/05/22  2210 10/06/22  0818   TROPONINI 0.026 0.041* 0.023   , and All pertinent lab results from the last 24 hours have been reviewed.    Significant Imaging: Echocardiogram: Transthoracic echo (TTE) complete ([facility] Only):   Results for orders placed or performed during the hospital encounter of 09/23/22   Echo   Result Value Ref Range    BSA 2.41 m2    IVC diameter 2.78 cm    Left Ventricular Outflow Tract Mean Velocity 0.99 cm/s    Left Ventricular Outflow Tract Mean Gradient 4.23 mmHg    LVIDd 5.11 3.5 - 6.0 cm    IVS 1.59 (A) 0.6 - 1.1 cm    Posterior Wall 1.27 (A) 0.6  "- 1.1 cm    Ao root annulus 3.53 cm    LVIDs 3.20 2.1 - 4.0 cm    FS 37 28 - 44 %    LV mass 310.79 g    LA size 3.92 cm    RVDD 2.63 cm    Left Ventricle Relative Wall Thickness 0.50 cm    AV mean gradient 15 mmHg    AV valve area 2.05 cm2    AV Velocity Ratio 0.51     AV index (prosthetic) 0.45     MV mean gradient 3 mmHg    MV valve area by continuity eq 4.22 cm2    E/A ratio 1.18     E wave deceleration time 151.55 msec    IVRT 88.49 msec    MV "A" wave duration [id number] msec    Pulm vein S/D ratio 0.82     LVOT diameter 2.41 cm    LVOT area 4.6 cm2    LVOT peak stephen 1.28 m/s    LVOT peak VTI 27.00 cm    Ao peak stephen 2.51 m/s    Ao VTI 60.0 cm    LVOT stroke volume 123.10 cm3    AV peak gradient 25 mmHg    MV peak gradient 5 mmHg    MV Peak E Stephen 1.24 m/s    TR Max Stephen 2.03 m/s    MV VTI 29.2 cm    MV Peak A Stephen 1.05 m/s    PV Peak S Stephen 0.69 m/s    PV Peak D Stephen 0.84 m/s    LV Systolic Volume 40.97 mL    LV Systolic Volume Index 17.4 mL/m2    LV Diastolic Volume 124.45 mL    LV Diastolic Volume Index 52.73 mL/m2    LV Mass Index 132 g/m2    RA Major Axis 5.50 cm    Left Atrium Minor Axis 5.28 cm    Left Atrium Major Axis 5.99 cm    Triscuspid Valve Regurgitation Peak Gradient 16 mmHg    LA Volume Index (Mod) 36.0 mL/m2    LA volume (mod) 85.05 cm3    Right Atrial Pressure (from IVC) 8 mmHg    EF 60 %    TV rest pulmonary artery pressure 24 mmHg    Narrative    · Concentric hypertrophy and normal systolic function.  · The estimated ejection fraction is 60%.  · Normal left ventricular diastolic function.  · Normal right ventricular size with normal right ventricular systolic   function.  · Mild left atrial enlargement.  · There is mild aortic valve stenosis.  · Aortic valve area is 2.05 cm2; peak velocity is 2.51 m/s; mean gradient   is 15 mmHg.  · Mild tricuspid regurgitation.  · Intermediate central venous pressure (8 mmHg).  · The estimated PA systolic pressure is 24 mmHg.  · Trivial pericardial " effusion.        Assessment and Plan:     * Syncope and collapse  Unclear if patient had syncopal event  Troponin mildly elevated and peaked on admission at 0.041  EKG ST; tele review SR/ST with frequent PACs  HH 6/18, plt 60K  Cr 1.8 (1.1 last week)   MRI brain- negative for acute findings   Abdominal XR- distended loops of small bowel suspicious for ileus.  Partial obstruction not entirely excluded.  Air and stool is seen within the large bowel and rectum.  Free air cannot be excluded on a supine film although none is definitively seen.  There are several calcifications in the left renal hilar region, concerning for nephrolithiasis  CXR and BNP unremarkable      TTE 09/24/2022   Concentric hypertrophy and normal systolic function.   The estimated ejection fraction is 60%.   Normal left ventricular diastolic function.   Normal right ventricular size with normal right ventricular systolic function.   Mild left atrial enlargement.   There is mild aortic valve stenosis.   Aortic valve area is 2.05 cm2; peak velocity is 2.51 m/s; mean gradient is 15 mmHg.   Mild tricuspid regurgitation.   Intermediate central venous pressure (8 mmHg).   The estimated PA systolic pressure is 24 mmHg.   Trivial pericardial effusion.    Recommend transfusing PRBCs which is expected to help with periods of tachycardia   No further cardiac work up is indicated at this time   Low suspicion for cardiac etiology             VTE Risk Mitigation (From admission, onward)         Ordered     IP VTE HIGH RISK PATIENT  Once         10/05/22 1609     Place sequential compression device  Until discontinued         10/05/22 1609                Thank you for your consult. I will sign off. Please contact us if you have any additional questions.    Albert Haider NP  Cardiology   [facility] - Telemetry

## 2022-10-06 NOTE — ASSESSMENT & PLAN NOTE
Tele  Cycle CE troponin elevated, however cardiology states Low suspicion for cardiac etiology      Had a recent echo 9/2022 HFpEF  Fall precautions

## 2022-10-06 NOTE — CONSULTS
"Consult Note  Palliative Care    Consult Requested By: Lukas Linares MD  Reason for Consult: Goals of care    SUBJECTIVE:     History of Present Illness:  Disease Process: Cancer    71M undomiciled with unknown PMH aside from recent (9/23/22) admit for SOB during which he was found to have widespread lymphadenopathy suspicious for lymphoma; was recommended for heme/onc f/u which did not occur.     Pt has no fixed address and has been staying in [facility] since 8/21/22. 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 Pt has no ACP documents nor have any living relatives been contacted to date; per collateral from a friend pt has an estranged son living in [place] whom CM is attempting to locate at this time.    Per pt's friend and apparent closest contact, "Shalonda", a hotel employee, pt had not been answering phone calls for two days and she found him underneath his hotel room sink late 10/5, confused and covered in excrement, reporting he had fallen out of bed and that his legs felt weak.     Arrived hypertensive with hypovolemic exam findings, Cr 1.6 (baseline 1.1), Na 135 (chronic). CBC sig for Hb 7.9 (a bit above baseline), PLT 60. CT head nonacute with age-indeterminate left frontoparietal encephalomalacia. Started on LR, thiamine, folate and admitted to  service.    Interval Hospital Course    10/6: Cards consulted for syncope workup, assesses as noncardiac 2/2 dehydration and anemia.     Palliative has been consulted for goals of care.    At time of writing pt is lying in bed in NAD, AAOx2 (name and situation), easily rouses to voice and is cooperative with most questions and commands though with severe dysarthria. Pt states he is aware of the concern for lymphoma from his last admission but does not elaborate. His principal complaint is stiffness and soreness of the right hip that improves with positional changes. Pt also " complains loudly of thirst and requests water. Noted able to reposition his leg by himself though with moderate discomfort. Moves all limbs.    Very difficult to communicate with pt other than binary yes/no questions due to degree of dysarthria.     No past medical history on file.  No past surgical history on file.  No family history on file.       Mental Status: Engaged, Disoriented    ECOG Performance Status Grade: 4 - Completely disabled    Review of Systems:  Positive for thirst and right hip pain.    Review of Symptoms      Symptom Assessment (ESAS 0-10 Scale)  Unable to complete assessment due to Mental status change     CAM / Delirium:  Positive  Constipation:  Negative  Diarrhea:  Negative      Pain Assessment in Advanced Demential Scale (PAINAD)   Breathing - Independent of vocalization:  0  Negative vocalization:  0  Facial expression:  2  Body language:  0  Consolability:  0  Total:  2    ECOG Performance Status rdGrdrrdarddrderd:rd rd3rd Living Arrangements:  Lives in apartment    Psychosocial/Cultural: Undomiciled and estranged from living relatives. Has been living in a hotel room cared for by staff on volunteer basis.    Spiritual:  F - Shellie and Belief:  Unable to assess  I - Importance:  Unable to assess  C - Community:  N/a  A - Address in Care:  Will need facility placement, optimistically with family consent.     Time-Based Charting:  Yes  Chart Review: 25 minutes  Face to Face: 10 minutes  Symptom Assessment: 10 minutes  Coordination of Care: 5 minutes  Discharge Plannin minutes    Total Time Spent: 55 minutes    Advance Care Planning   Advance Directives:   Living Will: No        Oral Declaration: No    [place]POST: No    Do Not Resuscitate Status: No    Medical Power of : No    Agent's Name:  None identified to date   Agent's Contact Number:  None identified to date    Decision Making:  Patient unable to communicate due to disease severity/cognitive impairment  Goals of Care: The patient lacks  capacity to determine goals of care. Actively working to identify appropriate surrogate decision makers.       OBJECTIVE:     Physical Exam  Constitutional:       Appearance: He is ill-appearing.   HENT:      Head: Normocephalic and atraumatic.      Mouth/Throat:      Mouth: Mucous membranes are dry.      Pharynx: Oropharyngeal exudate (thick mucous on tongue) present.   Eyes:      Extraocular Movements: Extraocular movements intact.      Pupils: Pupils are equal, round, and reactive to light.   Cardiovascular:      Rate and Rhythm: Normal rate and regular rhythm.      Pulses: Normal pulses.      Heart sounds: Normal heart sounds. No murmur heard.    No friction rub. No gallop.   Pulmonary:      Effort: Pulmonary effort is normal.      Breath sounds: Normal breath sounds. No wheezing or rales.   Abdominal:      General: Abdomen is flat.      Palpations: Abdomen is soft. There is no mass.      Tenderness: There is abdominal tenderness (RLQ). There is no guarding.   Musculoskeletal:         General: No swelling, tenderness or deformity. Normal range of motion.   Skin:     General: Skin is warm and dry.      Capillary Refill: Capillary refill takes less than 2 seconds.      Coloration: Skin is pale.   Neurological:      Mental Status: He is alert. He is disoriented.      Cranial Nerves: Dysarthria present.   Psychiatric:         Attention and Perception: Attention and perception normal.         Mood and Affect: Mood and affect normal.         Speech: Speech is delayed and slurred.         Behavior: Behavior is cooperative.         Cognition and Memory: Cognition is impaired. Memory is impaired.     ASSESSMENT/PLAN:     71M undomiciled with unknown PMH aside from recently noted widespread lymphadenopathy worrisome for lymphoma admitted to  service after pt was found soiled and altered at his place of residence, hypovolemic/hyponatremic with concern for beer potomania and multifactorial dementia (EtOH + left  frontoparietal encephalomalacia on CT). Mental status minimally improved with fluid resuscitation. Palliative consulted for goals of care.    Long-term prognosis is poor with concern for underlying lymphoma and acute decline from unclear baseline. Pt is not a candidate for cancer directed treatment given no social support and poor functional status. Appears most appropriate for hospice care at a nursing facility however with no financial backing we will need to locate a relative; CM following lead from pt's friend regarding an estranged son.    If pt develops severe symptoms or stigmata of imminent death then two physician consent for inpatient hospice may be the most appropriate dispo, for now please continue efforts to locate any blood relatives.    Recommendations:  Medical: supportive care per primary; consider flow cytometry w/ lymphoma panel to better support working dx of heme malignancy  Symptom Management: continue CIWA for now, please d/c benzos once felt out of withdrawal window  Psychosocial: unknown baseline, confused and dysarthric but unclear how acute these deficits are  Legal: has no MPOA, may have an estranged son, not yet confirmed - CM investigating  Prognosis: friends claim pt was recently working and driving, questionable reliability but complicates prognosticating for possible dementia; as above further lab support for suspected lymphoma would help substantiate this qualifying probable hospice dx    Espinoza Esquivel MD  Hospice and Palliative Medicine  Palliative Care Pager: [phone]    Advance Care Planning     Date: 10/06/2022  Patient did not wish or was not able to name a surrogate decision maker or provide an Advance Care Plan.

## 2022-10-06 NOTE — PLAN OF CARE
MARIELLE noted pt to have 2 adult friends to call and check on status of patient.   Shalonda(hotel staff) [phone]  Lani (friend) [phone]    2:00pm MARIELLE placed call to Lani and left VM asking for call back to confirm relationship to pt and to receive any information she may have such as NOK.     2:03pm MARIELLE placed call to Shalonda. Unidentified female answered the phone and reported Shalonda not to be there at the moment. MARIELLE informed calling from [facility] and would like a call back.       3:15pm- MARIELLE received call back from Yaneli who identified herself as a concerned friend but not family. Shalondagagan reported understanding we cannot disclose medical information without pt consent. Yaneli reported building a rapport with pt since moving into their hotel ([facility]) about 2 months ago August, 2022. Yaneli reports pt to often come from work and look tired and Yaneli would check on pt and ensure he ate etc. Yaneli report staff not seeing pt for 2 days which is unlike pt and that's when shalondagagan went to check on pt and found him in his room. Yaneli was able to contact pt friend Lani who confirmed pt has a son in [place]. Pt is estranged from son and did not want son to know about past 2 heart attacks. Yaneli informed Lani that now is the time that family has to be notified and updated in order to assist pt in care planning due to pt's physical decline.     3:30PM- MARIELLE placed another call to Lani  [phone] reiterating the need for call back and NOK phone number to assist with pt care- SW left VM.     ANDREWS Pichardo Case Management  [phone]

## 2022-10-06 NOTE — NURSING
Received a call from  Julissa Bland ( [phone]), who claimed to be pt daughter, but per  Magda, Rn note it states she is hotel staff who found pt .Informed Miguelxiao that pt information ca not be shared due to HIPPA. She began to curse me out and proceeded to hang up.

## 2022-10-06 NOTE — PLAN OF CARE
Problem: Adult Inpatient Plan of Care  Goal: Plan of Care Review  Outcome: Ongoing, Progressing  Goal: Patient-Specific Goal (Individualized)  Outcome: Ongoing, Progressing  Goal: Absence of Hospital-Acquired Illness or Injury  Outcome: Ongoing, Progressing  Goal: Optimal Comfort and Wellbeing  Outcome: Ongoing, Progressing  Goal: Readiness for Transition of Care  Outcome: Ongoing, Progressing     Problem: Fluid and Electrolyte Imbalance (Acute Kidney Injury/Impairment)  Goal: Fluid and Electrolyte Balance  Outcome: Ongoing, Progressing     Problem: Oral Intake Inadequate (Acute Kidney Injury/Impairment)  Goal: Optimal Nutrition Intake  Outcome: Ongoing, Progressing     Problem: Renal Function Impairment (Acute Kidney Injury/Impairment)  Goal: Effective Renal Function  Outcome: Ongoing, Progressing     Problem: Syncope  Goal: Absence of Syncopal Symptoms  Outcome: Ongoing, Progressing     Problem: Fall Injury Risk  Goal: Absence of Fall and Fall-Related Injury  Outcome: Ongoing, Progressing     Problem: Skin Injury Risk Increased  Goal: Skin Health and Integrity  Outcome: Ongoing, Progressing    The plan of care has been reviewed with patient and /or family at bedside, verbalized understanding compliance with plan of care noted. Pt education provided. All new and/or current orders completed.Time given/spent  for questions, comments, and /or concerns. No new complaints at this time unless otherwise noted. Safety measure implemented bed low, call bell  and personal belongings in reach, side rails x3 in use, protective environment maintained etc. Patient remains free from fall or injuries. Will continue to monitor patient throughout shift until following nurse assumes care.  ADDITIONAL INFORMATION:  Pt fluids maintained. Pt was agressive, he was yelling and hitting staff, ativan given for anxiety.pt pulled IV.

## 2022-10-06 NOTE — PLAN OF CARE
Before exploring placement options pt must receive state clearance.   SW placed call to [facility] to call in LOCET (Level of Care Eligibility Tool)   SW faxed PASRR (Level 1Pre-Admission Screening and Resident Review)  to [facility].   SW to await 142 for placement.        10/06/22 1309   Post-Acute Status   Post-Acute Authorization Placement   Post-Acute Placement Status Pending payor medical review/second level review   Discharge Delays None known at this time     Future Appointments   Date Time Provider Department Center   10/11/2022  2:00 PM Licha Franco MD [facility] IMPRI [facility]   10/13/2022 11:20 AM Fransisco Raines MD [facility] HEM ONC [facility]     ANDREWS Pichardo Case Management  [phone]

## 2022-10-06 NOTE — PLAN OF CARE
SW met with pt at bedside to complete assessment.   SW completed assessment utilizing chart review and friend contact.    Pt is reported to be residing at [facility]. Pt has been residence there since August, 2022. Pt is able to drive and owns a van. 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 Pt is independent of ADL's and no DME in use currently. Pt was reported to be walking just a week ago. Pt is reported to be supported by hotel staff rona and friend Lani. Pt has an adult son and daughter in [place]. SW placed call to friend Lani to receive family contact. MARIELLE updated whiteboard with [facility] name and contact information. SW will continue to follow pt throughout care and assist with any discharge needs.      Pt gave permission to contact son if number provided by friends due to pt not having phone on him and not knowing phone number.     10/06/22 1533   Discharge Planning   Assessment Type Discharge Planning Brief Assessment   Resource/Environmental Concerns environmental   Environment Concerns permanent residence, none   Support Systems Friends/neighbors   Equipment Currently Used at Home none   Current Living Arrangements home/apartment/condo   Patient/Family Anticipated Services at Transition      Future Appointments   Date Time Provider Department Center   10/11/2022  2:00 PM Licha Franco MD [facility] IMPRI [facility]   10/13/2022 11:20 AM Fransisco Raines MD [facility] HEM ONC ANDREWS Saldivar Case Management  [phone]

## 2022-10-06 NOTE — ASSESSMENT & PLAN NOTE
Unclear if patient had syncopal event  Troponin mildly elevated and peaked on admission at 0.041  EKG ST; tele review SR/ST with frequent PACs  HH 6/18, plt 60K  Cr 1.8 (1.1 last week)   MRI brain- negative for acute findings   Abdominal XR- distended loops of small bowel suspicious for ileus.  Partial obstruction not entirely excluded.  Air and stool is seen within the large bowel and rectum.  Free air cannot be excluded on a supine film although none is definitively seen.  There are several calcifications in the left renal hilar region, concerning for nephrolithiasis  CXR and BNP unremarkable      TTE 09/24/2022   Concentric hypertrophy and normal systolic function.   The estimated ejection fraction is 60%.   Normal left ventricular diastolic function.   Normal right ventricular size with normal right ventricular systolic function.   Mild left atrial enlargement.   There is mild aortic valve stenosis.   Aortic valve area is 2.05 cm2; peak velocity is 2.51 m/s; mean gradient is 15 mmHg.   Mild tricuspid regurgitation.   Intermediate central venous pressure (8 mmHg).   The estimated PA systolic pressure is 24 mmHg.   Trivial pericardial effusion.    Recommend transfusing PRBCs which is expected to help with periods of tachycardia   No further cardiac work up is indicated at this time   Low suspicion for cardiac etiology

## 2022-10-06 NOTE — PROGRESS NOTES
[facility] Medicine  Progress Note    Patient Name: Author Rafael Lawson  MRN: [id number]  Patient Class: IP- Inpatient   Admission Date: 10/5/2022  Length of Stay: 0 days  Attending Physician: Lukas Linares MD  Primary Care Provider: No primary care provider on file.        Subjective:     Principal Problem:Syncope and collapse        HPI:  71-year-old male, homeless, unknown medical history, brought to the ED by EMS for altered mental status.  Patient is confused about his situation he alert and oriented to self and place.  Per nursing chart, patient was found in the hotel bathroom on the floor, covered in urine and feces.  Patient doesn't remember leading events, denied chest pain or dizziness prior to, no n/v. Says that he might have a fallen and woke up in a hotel bed then EMS brought him in here. Reports shortness of breath, bilaterally lower extremities weakness, unable to move them without assistant.  Denies chest pain, abdominal pain, nausea/vomiting, no alcohol or drug uses. DW ER MD.       Overview/Hospital Course:  No notes on file    Interval hx: patient confused, garbled speech, pulling at lines.           Review of Systems unable to obtain, patient with altered mental status.       Objective:     Vital Signs (Most Recent):  Temp: 97.5 °F (36.4 °C) (10/06/22 1141)  Pulse: 93 (10/06/22 1205)  Resp: 14 (10/06/22 1141)  BP: (!) 106/56 (10/06/22 1141)  SpO2: 95 % (10/06/22 1205)   Vital Signs (24h Range):  Temp:  [96.6 °F (35.9 °C)-98.4 °F (36.9 °C)] 97.5 °F (36.4 °C)  Pulse:  [] 93  Resp:  [14-26] 14  SpO2:  [92 %-100 %] 95 %  BP: (104-126)/(56-70) 106/56     Weight: 113.4 kg (250 lb)  Body mass index is 32.98 kg/m².    Intake/Output Summary (Last 24 hours) at 10/6/2022 1332  Last data filed at 10/6/2022 0652  Gross per 24 hour   Intake --   Output 600 ml   Net -600 ml      Physical Exam  Constitutional:       Comments: Lethargic, confused, rises to sternal rub, protecting airway.     HENT:      Head: Normocephalic and atraumatic.      Nose: Nose normal.      Mouth/Throat:      Pharynx: Oropharynx is clear.   Eyes:      Conjunctiva/sclera: Conjunctivae normal.   Cardiovascular:      Rate and Rhythm: Normal rate and regular rhythm.   Pulmonary:      Effort: Pulmonary effort is normal.      Breath sounds: Normal breath sounds.   Abdominal:      General: Abdomen is flat. Bowel sounds are normal.      Palpations: Abdomen is soft.   Musculoskeletal:      Cervical back: Normal range of motion and neck supple.   Skin:     General: Skin is warm and dry.   Neurological:      Mental Status: He is lethargic, disoriented and confused.      GCS: GCS eye subscore is 2. GCS verbal subscore is 2. GCS motor subscore is 5.      Cranial Nerves: Dysarthria present.   Psychiatric:         Behavior: Behavior is agitated.         Cognition and Memory: Cognition is impaired.         Judgment: Judgment is impulsive.       Significant Labs: All pertinent labs within the past 24 hours have been reviewed.    Significant Imaging: I have reviewed all pertinent imaging results/findings within the past 24 hours.      Assessment/Plan:      * Syncope and collapse  Tele  Cycle CE troponin elevated, however cardiology states Low suspicion for cardiac etiology      Had a recent echo 9/2022 HFpEF  Fall precautions     Discharge planning issues  Pt appeared disheveled at POC he's reported to be living in a hotel room and likely is abusing etoh.   May benefit from placement. SW for DC planning      Transaminitis  Mild elevation   AST elevated I suspect from etoh abuse   Repeat chem after ivf      Hyponatremia  ?beer potomania. Appears chronic do not suspect it's c/t his AMS  Mey and Osm  Serial Na while giving isotonic saline in case this is siadh which I highly doubt    Na now 141          FRANK (acute kidney injury)  Patient with acute kidney injury likely due to IVVD/dehydration   Order urine stuides  IVF  Repeat chem  Limit  nephrotoxins   -increase IVF  -also likely s/t anemia       Intravascular volume depletion  Isotonic saline started   Monitor vol status       AMS (altered mental status)  Appears sig dehydrated but cannot tell us why he hasn't had a cup of water, he says in several days. Not sure of the validity of this. I suspect Etoh abuse.  MRI w/o acute infarct, but shows Left frontoparietal encephalomalacia. . Neuro checks and neuro c/s  UDS and etoh level  PT/OT/ST/SW  -neurology on board   - EEG to assess for nonepileptic seizure activity   -ammonia, RPR, HIV, ABG   -CIWA q 4 hours with PRN ativan if he is withdrawing from ETOH   -UA Pending   -will need CTA head when FRANK resolves     Pancytopenia    Prominent periaortic and bilateral iliac and inguinal lymph nodes visualized.  Findings are concerning for lymphoma per CT ab/pel 9/24  Hgb 6.1, platelets 60, WBC 3.85  Anemia panel pending   B12, folate WNL   Stool negative for OCB 9/24  Transfuse 2 units PRBC today, two MD signature needed for consent due to patient with AMS and no family contact available.   Consult Hemoc       VTE Risk Mitigation (From admission, onward)         Ordered     IP VTE HIGH RISK PATIENT  Once         10/05/22 1609     Place sequential compression device  Until discontinued         10/05/22 1609                Discharge Planning   ANUSHA:      Code Status: Full Code   Is the patient medically ready for discharge?:     Reason for patient still in hospital (select all that apply): Patient trending condition      Discharge Delays: None known at this time              Sheree Palma NP  Department of Hospital Medicine   [facility] [place]

## 2022-10-06 NOTE — ASSESSMENT & PLAN NOTE
Patient with acute kidney injury likely due to IVVD/dehydration   Order urine stuides  IVF  Repeat chem  Limit nephrotoxins   -increase IVF  -also likely s/t anemia

## 2022-10-06 NOTE — SUBJECTIVE & OBJECTIVE
Interval hx: patient confused, garbled speech, pulling at lines.           Review of Systems unable to obtain, patient with altered mental status.       Objective:     Vital Signs (Most Recent):  Temp: 97.5 °F (36.4 °C) (10/06/22 1141)  Pulse: 93 (10/06/22 1205)  Resp: 14 (10/06/22 1141)  BP: (!) 106/56 (10/06/22 1141)  SpO2: 95 % (10/06/22 1205)   Vital Signs (24h Range):  Temp:  [96.6 °F (35.9 °C)-98.4 °F (36.9 °C)] 97.5 °F (36.4 °C)  Pulse:  [] 93  Resp:  [14-26] 14  SpO2:  [92 %-100 %] 95 %  BP: (104-126)/(56-70) 106/56     Weight: 113.4 kg (250 lb)  Body mass index is 32.98 kg/m².    Intake/Output Summary (Last 24 hours) at 10/6/2022 1332  Last data filed at 10/6/2022 0652  Gross per 24 hour   Intake --   Output 600 ml   Net -600 ml      Physical Exam  Constitutional:       Comments: Lethargic, confused, rises to sternal rub, protecting airway.    HENT:      Head: Normocephalic and atraumatic.      Nose: Nose normal.      Mouth/Throat:      Pharynx: Oropharynx is clear.   Eyes:      Conjunctiva/sclera: Conjunctivae normal.   Cardiovascular:      Rate and Rhythm: Normal rate and regular rhythm.   Pulmonary:      Effort: Pulmonary effort is normal.      Breath sounds: Normal breath sounds.   Abdominal:      General: Abdomen is flat. Bowel sounds are normal.      Palpations: Abdomen is soft.   Musculoskeletal:      Cervical back: Normal range of motion and neck supple.   Skin:     General: Skin is warm and dry.   Neurological:      Mental Status: He is lethargic, disoriented and confused.      GCS: GCS eye subscore is 2. GCS verbal subscore is 2. GCS motor subscore is 5.      Cranial Nerves: Dysarthria present.   Psychiatric:         Behavior: Behavior is agitated.         Cognition and Memory: Cognition is impaired.         Judgment: Judgment is impulsive.       Significant Labs: All pertinent labs within the past 24 hours have been reviewed.    Significant Imaging: I have reviewed all pertinent imaging  results/findings within the past 24 hours.

## 2022-10-06 NOTE — ASSESSMENT & PLAN NOTE
- I have reviewed his chart/labs and examined him. I reviewed the peripheral smear. Platelet clumping is noted.  - iron studies (9/24/22) suggest severe anemia of chronic disease/inflammation (increased ferritin with decreased total iron binding capacity).  - CT scans from previous hospitalization reveal moderate splenomegaly and lymphadenopathy of unclear etiology. During previous hospitalization, he stated he did not want further workup.  - thrombocytopenia is likely secondary to clumping (pseudothrombocytopenia) and splenomegaly  - will perform further workup of anemia to see if there is any reversible causes  - WBC count is minimally decreased. Can be seen in liver disease. Continue to monitor  - agree with palliative care consult. His social situation makes it very difficult to provide consistent care in the outpatient setting.

## 2022-10-06 NOTE — ASSESSMENT & PLAN NOTE
Prominent periaortic and bilateral iliac and inguinal lymph nodes visualized.  Findings are concerning for lymphoma per CT ab/pel 9/24  Hgb 6.1, platelets 60, WBC 3.85  Anemia panel pending   B12, folate WNL   Stool negative for OCB 9/24  Transfuse 2 units PRBC today, two MD signature needed for consent due to patient with AMS and no family contact available.   Consult Hemoc

## 2022-10-06 NOTE — CONSULTS
"NEUROLOGY FLOOR CONSULT    Reason for consult:  AMS        Other sources of information : patient, past medical records, and ER records    CC:  "Hip pain"    HPI:   Author Rafael Lawson is a 71 y.o. year old male, with pancytopenia, diffuse lymphadenopathy of unknown significance and   Anemia is brought into the ED by EMS for the evaluation of altered mental status.  Per nursing chart, patient was found in the hotel bathroom on the floor, covered in urine and feces.  Patient does not remember anything leading to this event.  Patient was confused about his situation but he is alert and oriented to self and place. Patient has widespread lymphadenopathy worrisome for lymphoma. Patient's recent echo showed heart failure with preserved ejection fraction.  CBC showed hemoglobin of 6.1. Neurology was consulted for the evaluation of AMS. Patient was found to have Hypovolemia/hyponatremia with concern for beer potomania. MRI brain showed L frontoparietal encephalomalacia. On examination, patient seems to have returned to his baseline with mild drowsiness.     Histories:     Allergies:  Patient has no known allergies.    Current Medications:    Current Facility-Administered Medications   Medication Dose Route Frequency Provider Last Rate Last Admin    acetaminophen tablet 650 mg  650 mg Oral Q8H PRN Kevin Cervantes MD        albuterol-ipratropium 2.5 mg-0.5 mg/3 mL nebulizer solution 3 mL  3 mL Nebulization Q4H PRN Kevin Cervantes MD        bisacodyL suppository 10 mg  10 mg Rectal Daily PRN Kevin Cervantes MD        dextrose 10% bolus 125 mL  12.5 g Intravenous PRN Kevin Cervantes MD        dextrose 10% bolus 250 mL  25 g Intravenous PRN Keivn Cervantes MD        folic acid tablet 1 mg  1 mg Oral Daily Kevin Cervantes MD   1 mg at 10/06/22 0858    glucagon (human recombinant) injection 1 mg  1 mg Intramuscular PRN Kevin Cervantes MD        glucose chewable tablet 16 g  16 g Oral " PRN Kevin Cervantes MD        glucose chewable tablet 24 g  24 g Oral PRN Kevin Cervantes MD        HYDROcodone-acetaminophen 5-325 mg per tablet 1 tablet  1 tablet Oral Q6H PRN Kevin Cervantes MD        lactated ringers infusion   Intravenous Continuous Sheree Palma NP        LORazepam injection 1 mg  1 mg Intravenous Q4H PRN Sheree Palma NP        melatonin tablet 6 mg  6 mg Oral Nightly PRN Kevin Cervantes MD   6 mg at 10/05/22 2107    multivitamin tablet  1 tablet Oral Daily Kevin Cervantes MD   1 tablet at 10/06/22 0858    naloxone 0.4 mg/mL injection 0.02 mg  0.02 mg Intravenous PRN Kevin Cervantes MD        ondansetron disintegrating tablet 8 mg  8 mg Oral Q8H PRN Kevin Cervantes MD        polyethylene glycol packet 17 g  17 g Oral TID PRN Kevin Cervantes MD        prochlorperazine injection Soln 5 mg  5 mg Intravenous Q6H PRN Kevin Cervantes MD        simethicone chewable tablet 80 mg  1 tablet Oral QID PRN Kevin Cervantes MD        sodium chloride 0.9% flush 2 mL  2 mL Intravenous Q12H PRN Kevin Cervantes MD        thiamine tablet 100 mg  100 mg Oral Daily Kevin Cervantes MD   100 mg at 10/06/22 0858       Past Medical/Surgical/Family History:  Medical: No past medical history on file.   Surgeries: No past surgical history on file.   Family: No family history on file.,         Current Evaluation:     Vital Signs:   Vitals:    10/06/22 0801   BP: (!) 104/57   Pulse: 95   Resp: 18   Temp: 98.4 °F (36.9 °C)        Neuro exam limited due to patient being drowsy  ORIENTATION: Oriented to self, place, and president     MEMORY: Poor recent and remote memory    LANGUAGE: 2=Severe aphasia; all communication is through fragmentary expression; great need for inference, questioning, and guessing by the listener. Range of information that can be exchanged is limited; listener carries burden of communication. Examiner  cannot identify materials provided from patient response.     CRANIAL NERVES:  Decreased to no vision on R, as he is not blinking to the threat and could see fingers on examination   No facial asymmetry   L gaze preference   Difficulty in assessing all CN due to patients clinical presentation     MOTOR:  Pronator drift: absent  Strength grossly intact in all 4     Tone: normal      SENSORY:  normal to light touch .    CEREBELLAR/GAIT:  Finger to nose:Unable to examined   Gait: Deferred for the patients safety     LABORATORY STUDIES:  Recent Results (from the past 24 hour(s))   Comprehensive metabolic panel    Collection Time: 10/05/22  1:05 PM   Result Value Ref Range    Sodium 134 (L) 136 - 145 mmol/L    Potassium 4.5 3.5 - 5.1 mmol/L    Chloride 103 95 - 110 mmol/L    CO2 18 (L) 23 - 29 mmol/L    Glucose 147 (H) 70 - 110 mg/dL    BUN 51 (H) 8 - 23 mg/dL    Creatinine 1.6 (H) 0.5 - 1.4 mg/dL    Calcium 9.7 8.7 - 10.5 mg/dL    Total Protein 7.5 6.0 - 8.4 g/dL    Albumin 2.4 (L) 3.5 - 5.2 g/dL    Total Bilirubin 2.6 (H) 0.1 - 1.0 mg/dL    Alkaline Phosphatase 133 55 - 135 U/L    AST 45 (H) 10 - 40 U/L    ALT 42 10 - 44 U/L    Anion Gap 13 8 - 16 mmol/L    eGFR 45 (A) >60 mL/min/1.73 m^2   TSH    Collection Time: 10/05/22  1:05 PM   Result Value Ref Range    TSH 1.751 0.400 - 4.000 uIU/mL   Magnesium    Collection Time: 10/05/22  1:05 PM   Result Value Ref Range    Magnesium 2.4 1.6 - 2.6 mg/dL   Troponin I    Collection Time: 10/05/22  1:05 PM   Result Value Ref Range    Troponin I 0.019 0.000 - 0.026 ng/mL   Brain natriuretic peptide    Collection Time: 10/05/22  1:05 PM   Result Value Ref Range    BNP 63 0 - 99 pg/mL   CPK    Collection Time: 10/05/22  1:05 PM   Result Value Ref Range     20 - 200 U/L   COVID-19 Rapid Screening    Collection Time: 10/05/22  1:12 PM   Result Value Ref Range    SARS-CoV-2 RNA, Amplification, Qual Negative Negative   Influenza A & B by Molecular    Collection Time: 10/05/22   1:13 PM    Specimen: Nasopharyngeal Swab   Result Value Ref Range    Influenza A, Molecular Negative Negative    Influenza B, Molecular Negative Negative    Flu A & B Source Nasal swab    CBC auto differential    Collection Time: 10/05/22  2:25 PM   Result Value Ref Range    WBC 5.89 3.90 - 12.70 K/uL    RBC 2.71 (L) 4.60 - 6.20 M/uL    Hemoglobin 7.9 (L) 14.0 - 18.0 g/dL    Hematocrit 23.0 (L) 40.0 - 54.0 %    MCV 85 82 - 98 fL    MCH 29.2 27.0 - 31.0 pg    MCHC 34.3 32.0 - 36.0 g/dL    RDW 15.8 (H) 11.5 - 14.5 %    Platelets SEE COMMENT 150 - 450 K/uL    MPV 12.1 9.2 - 12.9 fL    Immature Granulocytes CANCELED 0.0 - 0.5 %    Immature Grans (Abs) CANCELED 0.00 - 0.04 K/uL    nRBC 0 0 /100 WBC    Gran % 81.0 (H) 38.0 - 73.0 %    Lymph % 9.0 (L) 18.0 - 48.0 %    Mono % 6.0 4.0 - 15.0 %    Eosinophil % 0.0 0.0 - 8.0 %    Basophil % 0.0 0.0 - 1.9 %    Bands 4.0 %    Platelet Estimate Clumped (A)     Aniso Slight     Hypo Occasional     Differential Method Automated    Group & Rh    Collection Time: 10/05/22  2:25 PM   Result Value Ref Range    ABO AB     Rh Type POS    Indirect Antiglobulin Test    Collection Time: 10/05/22  2:25 PM   Result Value Ref Range    Antibody Screen POS (A)    Direct antiglobulin test    Collection Time: 10/05/22  2:25 PM   Result Value Ref Range    Direct Al (POORNIMA) POS    Antibody identification    Collection Time: 10/05/22  2:25 PM   Result Value Ref Range    Antibody ID POS    Urinalysis, Reflex to Urine Culture Urine, Clean Catch    Collection Time: 10/05/22  2:51 PM    Specimen: Urine   Result Value Ref Range    Specimen UA Urine, Clean Catch     Color, UA Yellow Yellow, Straw, Dahlia    Appearance, UA Clear Clear    pH, UA 5.0 5.0 - 8.0    Specific Gravity, UA 1.020 1.005 - 1.030    Protein, UA 1+ (A) Negative    Glucose, UA Negative Negative    Ketones, UA Negative Negative    Bilirubin (UA) Negative Negative    Occult Blood UA Negative Negative    Nitrite, UA Negative Negative     Urobilinogen, UA 4.0-6.0 (A) <2.0 EU/dL    Leukocytes, UA Negative Negative   Drug screen panel, emergency    Collection Time: 10/05/22  2:51 PM   Result Value Ref Range    Benzodiazepines Negative Negative    Methadone metabolites Negative Negative    Cocaine (Metab.) Negative Negative    Opiate Scrn, Ur Negative Negative    Barbiturate Screen, Ur Negative Negative    Amphetamine Screen, Ur Negative Negative    THC Negative Negative    Phencyclidine Negative Negative    Creatinine, Urine 115.8 23.0 - 375.0 mg/dL    Toxicology Information SEE COMMENT    Urinalysis Microscopic    Collection Time: 10/05/22  2:51 PM   Result Value Ref Range    RBC, UA 2 0 - 4 /hpf    WBC, UA 2 0 - 5 /hpf    Bacteria Rare None-Occ /hpf    Hyaline Casts, UA 0 0-1/lpf /lpf    Microscopic Comment SEE COMMENT    Protein/Creatinine Ratio, Urine    Collection Time: 10/05/22  4:15 PM   Result Value Ref Range    Protein, Urine Random 32 (H) 0 - 15 mg/dL    Creatinine, Urine 116.3 23.0 - 375.0 mg/dL    Prot/Creat Ratio, Urine 0.28 (H) 0.00 - 0.20   Sodium, Random Urine    Collection Time: 10/05/22  4:15 PM   Result Value Ref Range    Sodium, Urine <20 (A) 20 - 250 mmol/L   Troponin I    Collection Time: 10/05/22  4:22 PM   Result Value Ref Range    Troponin I 0.027 (H) 0.000 - 0.026 ng/mL   Troponin I    Collection Time: 10/05/22  7:02 PM   Result Value Ref Range    Troponin I 0.026 0.000 - 0.026 ng/mL   Ethanol    Collection Time: 10/05/22  7:02 PM   Result Value Ref Range    Alcohol, Serum <10 <10 mg/dL   Osmolality, Serum    Collection Time: 10/05/22  7:02 PM   Result Value Ref Range    Osmolality 315 (H) 280 - 300 mOsm/kg   Sodium    Collection Time: 10/05/22  7:02 PM   Result Value Ref Range    Sodium 137 136 - 145 mmol/L   Troponin I    Collection Time: 10/05/22 10:10 PM   Result Value Ref Range    Troponin I 0.041 (H) 0.000 - 0.026 ng/mL   Sodium    Collection Time: 10/05/22 10:11 PM   Result Value Ref Range    Sodium 138 136 - 145 mmol/L    Comprehensive Metabolic Panel (CMP)    Collection Time: 10/06/22  5:38 AM   Result Value Ref Range    Sodium 140 136 - 145 mmol/L    Potassium 3.9 3.5 - 5.1 mmol/L    Chloride 109 95 - 110 mmol/L    CO2 20 (L) 23 - 29 mmol/L    Glucose 113 (H) 70 - 110 mg/dL    BUN 58 (H) 8 - 23 mg/dL    Creatinine 1.8 (H) 0.5 - 1.4 mg/dL    Calcium 8.9 8.7 - 10.5 mg/dL    Total Protein 6.3 6.0 - 8.4 g/dL    Albumin 2.1 (L) 3.5 - 5.2 g/dL    Total Bilirubin 2.4 (H) 0.1 - 1.0 mg/dL    Alkaline Phosphatase 118 55 - 135 U/L    AST 37 10 - 40 U/L    ALT 33 10 - 44 U/L    Anion Gap 11 8 - 16 mmol/L    eGFR 39 (A) >60 mL/min/1.73 m^2   Magnesium    Collection Time: 10/06/22  5:38 AM   Result Value Ref Range    Magnesium 2.4 1.6 - 2.6 mg/dL   Phosphorus    Collection Time: 10/06/22  5:38 AM   Result Value Ref Range    Phosphorus 4.8 (H) 2.7 - 4.5 mg/dL   CBC with Automated Differential    Collection Time: 10/06/22  5:38 AM   Result Value Ref Range    WBC 3.85 (L) 3.90 - 12.70 K/uL    RBC 2.12 (L) 4.60 - 6.20 M/uL    Hemoglobin 6.1 (L) 14.0 - 18.0 g/dL    Hematocrit 18.0 (LL) 40.0 - 54.0 %    MCV 85 82 - 98 fL    MCH 28.8 27.0 - 31.0 pg    MCHC 33.9 32.0 - 36.0 g/dL    RDW 15.9 (H) 11.5 - 14.5 %    Platelets 60 (L) 150 - 450 K/uL    MPV 12.7 9.2 - 12.9 fL    Immature Granulocytes 3.4 (H) 0.0 - 0.5 %    Gran # (ANC) 2.5 1.8 - 7.7 K/uL    Immature Grans (Abs) 0.13 (H) 0.00 - 0.04 K/uL    Lymph # 0.9 (L) 1.0 - 4.8 K/uL    Mono # 0.3 0.3 - 1.0 K/uL    Eos # 0.0 0.0 - 0.5 K/uL    Baso # 0.00 0.00 - 0.20 K/uL    nRBC 0 0 /100 WBC    Gran % 64.3 38.0 - 73.0 %    Lymph % 23.4 18.0 - 48.0 %    Mono % 8.6 4.0 - 15.0 %    Eosinophil % 0.3 0.0 - 8.0 %    Basophil % 0.0 0.0 - 1.9 %    Differential Method Automated    Sodium    Collection Time: 10/06/22  5:38 AM   Result Value Ref Range    Sodium 140 136 - 145 mmol/L   Troponin I    Collection Time: 10/06/22  8:18 AM   Result Value Ref Range    Troponin I 0.023 0.000 - 0.026 ng/mL         RADIOLOGY  STUDIES:  I have personally reviewed the images performed.     HEAD CT:   1. No acute intracranial findings.  2. Left frontoparietal encephalomalacia gliosis for which clinical correlation recommended.    BRAIN MRI     There is no evidence of restricted diffusion to suggest an acute infarction.     Ventricles are normal in size for age without evidence of hydrocephalus.  There is left frontoparietal encephalomalacia, likely related to a remote infarction.  There are calcifications along the posterior falx.  There are T2/FLAIR hyperintensities in the supratentorial white matter, compatible with mild chronic microvascular ischemic changes.  No mass, hemorrhage, or recent or remote major vascular distribution infarct.  No extra-axial blood or fluid collections. Normal vascular flow voids.     Bone marrow signal intensity is normal. Paranasal sinuses and mastoid air cells are clear.      Assessment:  LILIAN Hall Jr. is a 71 y.o. year old male, with pancytopenia, diffuse lymphadenopathy of unknown significance and   Anemia is here for the evaluation of syncope and acute encephalopathy, which could be 2/2 dehydration vs cardiogenic syncope vs seizures. Patient does have L frontoparietal encephalomalacia and that puts him at risk of seizures; however, we suspect that dehydration and anemia could be main contributors here. For better assessment, we will obtain MRA head and neck to look for cerebral blood flow and EEG to look for any epileptogenic focus.     Treatment Plan  Obtain MRA head and neck  Obtain Spot EEG  EKG reviewed. Echo from 9/23 showed EF of 60% with mild LAE, and mild AS  Seizure precautions   Fall precautions   Goals of care discussion   Palliative and Heme/Onc on board       Differential diagnosis was explained to the patient. All questions were answered. Patient understood and agreed to adhere to plan.       Case discussed with Dr. Chow       Will follow for additional input regarding management  of current neurologic condition and monitor for any new signs/symptoms to suggest neurologic detrimental changes.     Appreciate the consult.      Elaine Quintanilla- PGY IV  [employer] [facility]

## 2022-10-06 NOTE — HPI
71-year-old male, homeless, pancytopenia, diffuse lymphadenopathy of unknown significance. Patient was brought to the ED by EMS for altered mental status.  Patient is noted difficult to arouse on exam.  Per chart review, patient was found in the hotel bathroom on the floor, covered in urine and feces.  Patient didn't remember leading events, denied chest pain or dizziness prior to, no n/v. Said that he might have a fallen and woke up in a hotel bed then EMS brought him in here.  Troponin mildly elevated and peaked on admission at 0.041  EKG ST; tele review SR/ST with frequent PACs  HH 6/18, plt 60K  Cr 1.8 (1.1 last week)   MRI brain- negative for acute findings   Abdominal XR- distended loops of small bowel suspicious for ileus.  Partial obstruction not entirely excluded.  Air and stool is seen within the large bowel and rectum.  Free air cannot be excluded on a supine film although none is definitively seen.  There are several calcifications in the left renal hilar region, concerning for nephrolithiasis  CXR and BNP unremarkable     TTE 09/24/2022  Concentric hypertrophy and normal systolic function.  The estimated ejection fraction is 60%.  Normal left ventricular diastolic function.  Normal right ventricular size with normal right ventricular systolic function.  Mild left atrial enlargement.  There is mild aortic valve stenosis.  Aortic valve area is 2.05 cm2; peak velocity is 2.51 m/s; mean gradient is 15 mmHg.  Mild tricuspid regurgitation.  Intermediate central venous pressure (8 mmHg).  The estimated PA systolic pressure is 24 mmHg.  Trivial pericardial effusion.

## 2022-10-06 NOTE — CONSULTS
"[facility] - Telemetry  Hematology/Oncology  Consult Note    Patient Name: Author Rafael Lawson  MRN: [id number]  Admission Date: 10/5/2022  Hospital Length of Stay: 0 days  Code Status: Full Code   Attending Provider: Lukas Linares MD  Consulting Provider: Fransisco Raines MD  Primary Care Physician: No primary care provider on file.  Principal Problem:Syncope and collapse    Inpatient consult to Hematology/Oncology  Consult performed by: Fransisco Raines MD  Consult ordered by: Sheree Palma NP  Reason for consult: pancytopenia        Subjective:     HPI:  71 year-old male was admitted for syncope/encephalopathy/failure to thrive. Consult is for pancytopenia.      - he states he feels "great" today. [It is somewhat difficult to hear what he says.] He denies shortness of breath, chest pain, nausea, vomiting, diarrhea, constipation.        Oncology Treatment Plan:   [No matching plan found]    Medications:  Continuous Infusions:   lactated ringers 150 mL/hr at 10/06/22 0900     Scheduled Meds:   folic acid  1 mg Oral Daily    multivitamin  1 tablet Oral Daily    thiamine  100 mg Oral Daily     PRN Meds:sodium chloride, acetaminophen, albuterol-ipratropium, bisacodyL, dextrose 10%, dextrose 10%, glucagon (human recombinant), glucose, glucose, HYDROcodone-acetaminophen, lorazepam, melatonin, naloxone, ondansetron, polyethylene glycol, prochlorperazine, simethicone, sodium chloride 0.9%     Review of patient's allergies indicates:  No Known Allergies     No past medical history on file.  No past surgical history on file.  Family History    None       Tobacco Use    Smoking status: Not on file    Smokeless tobacco: Not on file   Substance and Sexual Activity    Alcohol use: Not on file    Drug use: Not on file    Sexual activity: Not on file       Review of Systems   Constitutional:  Negative for fatigue.   HENT:  Negative for sore throat.    Eyes:  Negative for visual disturbance.   Respiratory:  Negative for " shortness of breath.    Cardiovascular:  Negative for chest pain.   Gastrointestinal:  Negative for abdominal pain.   Genitourinary:  Negative for dysuria.   Musculoskeletal:  Negative for back pain.   Skin:  Negative for rash.   Neurological:  Negative for headaches.   Hematological:  Negative for adenopathy.   Psychiatric/Behavioral:  The patient is not nervous/anxious.    Objective:     Vital Signs (Most Recent):  Temp: 98.2 °F (36.8 °C) (10/06/22 1605)  Pulse: 60 (10/06/22 1605)  Resp: 14 (10/06/22 1605)  BP: (!) 120/58 (10/06/22 1605)  SpO2: 95 % (10/06/22 1605)   Vital Signs (24h Range):  Temp:  [96.6 °F (35.9 °C)-98.4 °F (36.9 °C)] 98.2 °F (36.8 °C)  Pulse:  [] 60  Resp:  [14-26] 14  SpO2:  [92 %-100 %] 95 %  BP: (104-126)/(56-65) 120/58     Weight: 113.4 kg (250 lb)  Body mass index is 32.98 kg/m².  Body surface area is 2.42 meters squared.      Intake/Output Summary (Last 24 hours) at 10/6/2022 1611  Last data filed at 10/6/2022 1437  Gross per 24 hour   Intake 120 ml   Output 1100 ml   Net -980 ml       Physical Exam  Vitals and nursing note reviewed.   Constitutional:       Appearance: He is well-developed.      Comments: Disheveled   HENT:      Head: Normocephalic and atraumatic.   Eyes:      Pupils: Pupils are equal, round, and reactive to light.   Cardiovascular:      Rate and Rhythm: Normal rate and regular rhythm.   Pulmonary:      Effort: Pulmonary effort is normal.      Breath sounds: Normal breath sounds.   Abdominal:      General: Bowel sounds are normal.      Palpations: Abdomen is soft.   Musculoskeletal:         General: Normal range of motion.      Cervical back: Normal range of motion and neck supple.   Skin:     General: Skin is warm and dry.   Neurological:      Mental Status: He is alert and oriented to person, place, and time.   Psychiatric:         Behavior: Behavior normal.         Thought Content: Thought content normal.         Judgment: Judgment normal.       Significant Labs:    Labs have been reviewed.    Lab Results   Component Value Date    WBC 3.85 (L) 10/06/2022    HGB 6.1 (L) 10/06/2022    HCT 18.0 (LL) 10/06/2022    MCV 85 10/06/2022    PLT 60 (L) 10/06/2022           Diagnostic Results:  CT abdomen/pelvis (9/24/22): I have personally reviewed the images  Splenomegaly with prominent retroperitoneal and upper abdominal adenopathy.  Prominent periaortic and bilateral iliac and inguinal lymph nodes visualized.  Findings are concerning for lymphoma.     Micronodular contour of the liver concerning for intrinsic hepatic disease.     Cholelithiasis without cholecystitis.     Small amount of ascites seen about the liver and spleen extending in the bilateral pericolic gutters into the pelvis.     Right renal cyst.    Assessment/Plan:     Pancytopenia  - I have reviewed his chart/labs and examined him. I reviewed the peripheral smear. Platelet clumping is noted.  - iron studies (9/24/22) suggest severe anemia of chronic disease/inflammation (increased ferritin with decreased total iron binding capacity).  - CT scans from previous hospitalization reveal moderate splenomegaly and lymphadenopathy of unclear etiology. During previous hospitalization, he stated he did not want further workup.  - thrombocytopenia is likely secondary to clumping (pseudothrombocytopenia) and splenomegaly  - will perform further workup of anemia to see if there is any reversible causes  - WBC count is minimally decreased. Can be seen in liver disease. Continue to monitor  - agree with palliative care consult. His social situation makes it very difficult to provide consistent care in the outpatient setting.        Thank you for your consult.     Fransisco Raines MD  Hematology/Oncology  [facility] - Telemetry

## 2022-10-06 NOTE — NURSING
Pt remained oriented to self, place and time. He appeared to be resting well throughout day until after lunch. Pt is more alert and able to tolerate oral intake. V/S remain stable. Family updated on POC and contact information noted on chart. Pt denies any pain. He c/o nausea that was relieved by oral antiemetic. Safety precautions maintained throughout day, bed locked in lowest position and call light within reach. [employer] tele system maintained. Will continue to monitor for any changes.

## 2022-10-06 NOTE — PLAN OF CARE
SW attempted assessment Via [employer]. Pt noted to be asleep. SW called pt name 3 times. Pt remained sleeping.  SW to assess at later time.  Pt is noted to be laying asleep on his back asleep. Pt appears to be comfortable AEB no facial grimacing or shifting in bed.    12:19pm- Pt still asleep in bed. SW called pt name and pt raised eye brows and opened eyes briefly. SW added name to pt whiteboard and asked assigned nurse update if pt awakens or identifies contacts names.     SW noted pt upgraded to inpatient status.      ANDREWS Pichardo  [facility]  [phone]

## 2022-10-06 NOTE — SUBJECTIVE & OBJECTIVE
No past medical history on file.    No past surgical history on file.    Review of patient's allergies indicates:  No Known Allergies    No current facility-administered medications on file prior to encounter.     Current Outpatient Medications on File Prior to Encounter   Medication Sig    diclofenac sodium (VOLTAREN) 1 % Gel Apply 4 g topically once daily.     Family History    None       Tobacco Use    Smoking status: Not on file    Smokeless tobacco: Not on file   Substance and Sexual Activity    Alcohol use: Not on file    Drug use: Not on file    Sexual activity: Not on file     Review of Systems   Unable to perform ROS: Mental status change   Objective:     Vital Signs (Most Recent):  Temp: 97.5 °F (36.4 °C) (10/06/22 1141)  Pulse: 93 (10/06/22 1205)  Resp: 14 (10/06/22 1141)  BP: (!) 106/56 (10/06/22 1141)  SpO2: 95 % (10/06/22 1205)   Vital Signs (24h Range):  Temp:  [96.6 °F (35.9 °C)-98.4 °F (36.9 °C)] 97.5 °F (36.4 °C)  Pulse:  [] 93  Resp:  [14-26] 14  SpO2:  [92 %-100 %] 95 %  BP: (104-126)/(56-70) 106/56     Weight: 113.4 kg (250 lb)  Body mass index is 32.98 kg/m².    SpO2: 95 %  O2 Device (Oxygen Therapy): room air      Intake/Output Summary (Last 24 hours) at 10/6/2022 1246  Last data filed at 10/6/2022 0652  Gross per 24 hour   Intake --   Output 600 ml   Net -600 ml       Lines/Drains/Airways       Peripheral Intravenous Line  Duration                  Peripheral IV - Single Lumen 10/05/22 1324 20 G;1 in Anterior;Distal;Right Forearm <1 day                    Physical Exam  Constitutional:       General: He is not in acute distress.  Eyes:      General:         Right eye: No discharge.         Left eye: No discharge.   Cardiovascular:      Rate and Rhythm: Normal rate and regular rhythm.      Heart sounds: Murmur heard.   Pulmonary:      Effort: Pulmonary effort is normal.      Breath sounds: No rales.       Significant Labs: BMP:   Recent Labs   Lab 10/05/22  1305 10/05/22  1902  10/05/22  2211 10/06/22  0538   *  --   --  113*   * 137 138 140  140   K 4.5  --   --  3.9     --   --  109   CO2 18*  --   --  20*   BUN 51*  --   --  58*   CREATININE 1.6*  --   --  1.8*   CALCIUM 9.7  --   --  8.9   MG 2.4  --   --  2.4   , CMP   Recent Labs   Lab 10/05/22  1305 10/05/22  1902 10/05/22  2211 10/06/22  0538   * 137 138 140  140   K 4.5  --   --  3.9     --   --  109   CO2 18*  --   --  20*   *  --   --  113*   BUN 51*  --   --  58*   CREATININE 1.6*  --   --  1.8*   CALCIUM 9.7  --   --  8.9   PROT 7.5  --   --  6.3   ALBUMIN 2.4*  --   --  2.1*   BILITOT 2.6*  --   --  2.4*   ALKPHOS 133  --   --  118   AST 45*  --   --  37   ALT 42  --   --  33   ANIONGAP 13  --   --  11   , CBC   Recent Labs   Lab 10/05/22  1425 10/06/22  0538   WBC 5.89 3.85*   HGB 7.9* 6.1*   HCT 23.0* 18.0*   PLT SEE COMMENT 60*   , INR No results for input(s): INR, PROTIME in the last 48 hours., Lipid Panel No results for input(s): CHOL, HDL, LDLCALC, TRIG, CHOLHDL in the last 48 hours., Troponin   Recent Labs   Lab 10/05/22  1902 10/05/22  2210 10/06/22  0818   TROPONINI 0.026 0.041* 0.023   , and All pertinent lab results from the last 24 hours have been reviewed.    Significant Imaging: Echocardiogram: Transthoracic echo (TTE) complete (Cupid Only):   Results for orders placed or performed during the hospital encounter of 09/23/22   Echo   Result Value Ref Range    BSA 2.41 m2    IVC diameter 2.78 cm    Left Ventricular Outflow Tract Mean Velocity 0.99 cm/s    Left Ventricular Outflow Tract Mean Gradient 4.23 mmHg    LVIDd 5.11 3.5 - 6.0 cm    IVS 1.59 (A) 0.6 - 1.1 cm    Posterior Wall 1.27 (A) 0.6 - 1.1 cm    Ao root annulus 3.53 cm    LVIDs 3.20 2.1 - 4.0 cm    FS 37 28 - 44 %    LV mass 310.79 g    LA size 3.92 cm    RVDD 2.63 cm    Left Ventricle Relative Wall Thickness 0.50 cm    AV mean gradient 15 mmHg    AV valve area 2.05 cm2    AV Velocity Ratio 0.51     AV index  "(prosthetic) 0.45     MV mean gradient 3 mmHg    MV valve area by continuity eq 4.22 cm2    E/A ratio 1.18     E wave deceleration time 151.55 msec    IVRT 88.49 msec    MV "A" wave duration [id number] msec    Pulm vein S/D ratio 0.82     LVOT diameter 2.41 cm    LVOT area 4.6 cm2    LVOT peak stephen 1.28 m/s    LVOT peak VTI 27.00 cm    Ao peak stephen 2.51 m/s    Ao VTI 60.0 cm    LVOT stroke volume 123.10 cm3    AV peak gradient 25 mmHg    MV peak gradient 5 mmHg    MV Peak E Stephen 1.24 m/s    TR Max Stephen 2.03 m/s    MV VTI 29.2 cm    MV Peak A Stephen 1.05 m/s    PV Peak S Stephen 0.69 m/s    PV Peak D Stephen 0.84 m/s    LV Systolic Volume 40.97 mL    LV Systolic Volume Index 17.4 mL/m2    LV Diastolic Volume 124.45 mL    LV Diastolic Volume Index 52.73 mL/m2    LV Mass Index 132 g/m2    RA Major Axis 5.50 cm    Left Atrium Minor Axis 5.28 cm    Left Atrium Major Axis 5.99 cm    Triscuspid Valve Regurgitation Peak Gradient 16 mmHg    LA Volume Index (Mod) 36.0 mL/m2    LA volume (mod) 85.05 cm3    Right Atrial Pressure (from IVC) 8 mmHg    EF 60 %    TV rest pulmonary artery pressure 24 mmHg    Narrative    · Concentric hypertrophy and normal systolic function.  · The estimated ejection fraction is 60%.  · Normal left ventricular diastolic function.  · Normal right ventricular size with normal right ventricular systolic   function.  · Mild left atrial enlargement.  · There is mild aortic valve stenosis.  · Aortic valve area is 2.05 cm2; peak velocity is 2.51 m/s; mean gradient   is 15 mmHg.  · Mild tricuspid regurgitation.  · Intermediate central venous pressure (8 mmHg).  · The estimated PA systolic pressure is 24 mmHg.  · Trivial pericardial effusion.        "

## 2022-10-06 NOTE — ASSESSMENT & PLAN NOTE
?beer potomania. Appears chronic do not suspect it's c/t his AMS  Mey and Osm  Serial Na while giving isotonic saline in case this is siadh which I highly doubt    Na now 141

## 2022-10-06 NOTE — SUBJECTIVE & OBJECTIVE
"- he states he feels "great" today. [It is somewhat difficult to hear what he says.] He denies shortness of breath, chest pain, nausea, vomiting, diarrhea, constipation.        Oncology Treatment Plan:   [No matching plan found]    Medications:  Continuous Infusions:   lactated ringers 150 mL/hr at 10/06/22 0900     Scheduled Meds:   folic acid  1 mg Oral Daily    multivitamin  1 tablet Oral Daily    thiamine  100 mg Oral Daily     PRN Meds:sodium chloride, acetaminophen, albuterol-ipratropium, bisacodyL, dextrose 10%, dextrose 10%, glucagon (human recombinant), glucose, glucose, HYDROcodone-acetaminophen, lorazepam, melatonin, naloxone, ondansetron, polyethylene glycol, prochlorperazine, simethicone, sodium chloride 0.9%     Review of patient's allergies indicates:  No Known Allergies     No past medical history on file.  No past surgical history on file.  Family History    None       Tobacco Use    Smoking status: Not on file    Smokeless tobacco: Not on file   Substance and Sexual Activity    Alcohol use: Not on file    Drug use: Not on file    Sexual activity: Not on file       Review of Systems   Constitutional:  Negative for fatigue.   HENT:  Negative for sore throat.    Eyes:  Negative for visual disturbance.   Respiratory:  Negative for shortness of breath.    Cardiovascular:  Negative for chest pain.   Gastrointestinal:  Negative for abdominal pain.   Genitourinary:  Negative for dysuria.   Musculoskeletal:  Negative for back pain.   Skin:  Negative for rash.   Neurological:  Negative for headaches.   Hematological:  Negative for adenopathy.   Psychiatric/Behavioral:  The patient is not nervous/anxious.    Objective:     Vital Signs (Most Recent):  Temp: 98.2 °F (36.8 °C) (10/06/22 1605)  Pulse: 60 (10/06/22 1605)  Resp: 14 (10/06/22 1605)  BP: (!) 120/58 (10/06/22 1605)  SpO2: 95 % (10/06/22 1605)   Vital Signs (24h Range):  Temp:  [96.6 °F (35.9 °C)-98.4 °F (36.9 °C)] 98.2 °F (36.8 °C)  Pulse:  [] " 60  Resp:  [14-26] 14  SpO2:  [92 %-100 %] 95 %  BP: (104-126)/(56-65) 120/58     Weight: 113.4 kg (250 lb)  Body mass index is 32.98 kg/m².  Body surface area is 2.42 meters squared.      Intake/Output Summary (Last 24 hours) at 10/6/2022 1611  Last data filed at 10/6/2022 1437  Gross per 24 hour   Intake 120 ml   Output 1100 ml   Net -980 ml       Physical Exam  Vitals and nursing note reviewed.   Constitutional:       Appearance: He is well-developed.      Comments: Disheveled   HENT:      Head: Normocephalic and atraumatic.   Eyes:      Pupils: Pupils are equal, round, and reactive to light.   Cardiovascular:      Rate and Rhythm: Normal rate and regular rhythm.   Pulmonary:      Effort: Pulmonary effort is normal.      Breath sounds: Normal breath sounds.   Abdominal:      General: Bowel sounds are normal.      Palpations: Abdomen is soft.   Musculoskeletal:         General: Normal range of motion.      Cervical back: Normal range of motion and neck supple.   Skin:     General: Skin is warm and dry.   Neurological:      Mental Status: He is alert and oriented to person, place, and time.   Psychiatric:         Behavior: Behavior normal.         Thought Content: Thought content normal.         Judgment: Judgment normal.       Significant Labs:   Labs have been reviewed.    Lab Results   Component Value Date    WBC 3.85 (L) 10/06/2022    HGB 6.1 (L) 10/06/2022    HCT 18.0 (LL) 10/06/2022    MCV 85 10/06/2022    PLT 60 (L) 10/06/2022           Diagnostic Results:  CT abdomen/pelvis (9/24/22): I have personally reviewed the images  Splenomegaly with prominent retroperitoneal and upper abdominal adenopathy.  Prominent periaortic and bilateral iliac and inguinal lymph nodes visualized.  Findings are concerning for lymphoma.     Micronodular contour of the liver concerning for intrinsic hepatic disease.     Cholelithiasis without cholecystitis.     Small amount of ascites seen about the liver and spleen extending in  the bilateral pericolic gutters into the pelvis.     Right renal cyst.

## 2022-10-06 NOTE — PT/OT/SLP PROGRESS
Occupational Therapy      Patient Name:  Author Rafael Lawson   MRN:  [id number]    Patient not seen today secondary to Other (Comment) ((Pt asleep. Pt given medication for agitation per nsg. Low H&H also noted 6.1/18. Pt not appropriate to participate in therapy at this time). Will follow-up as appropriate.    10/6/2022

## 2022-10-06 NOTE — PT/OT/SLP PROGRESS
Speech Language Pathology      Author Rafael Machado.  MRN: [id number]    11:20am  Patient not seen today secondary to Patient fatigue. Pt given medication for agitation. RN asked SLP to defer for now as pt unable to participate.       10/6/2022

## 2022-10-07 PROBLEM — G93.41 ENCEPHALOPATHY, METABOLIC: Status: ACTIVE | Noted: 2022-10-07

## 2022-10-07 PROBLEM — N39.0 UTI (URINARY TRACT INFECTION): Status: ACTIVE | Noted: 2022-10-07

## 2022-10-07 LAB
ALBUMIN SERPL BCP-MCNC: 2.2 G/DL (ref 3.5–5.2)
ALP SERPL-CCNC: 123 U/L (ref 55–135)
ALT SERPL W/O P-5'-P-CCNC: 30 U/L (ref 10–44)
AMPHET+METHAMPHET UR QL: NEGATIVE
AMPHET+METHAMPHET UR QL: NEGATIVE
ANION GAP SERPL CALC-SCNC: 8 MMOL/L (ref 8–16)
AST SERPL-CCNC: 34 U/L (ref 10–40)
BARBITURATES UR QL SCN>200 NG/ML: NEGATIVE
BARBITURATES UR QL SCN>200 NG/ML: NEGATIVE
BASOPHILS NFR BLD: 0 % (ref 0–1.9)
BENZODIAZ UR QL SCN>200 NG/ML: NEGATIVE
BENZODIAZ UR QL SCN>200 NG/ML: NEGATIVE
BILIRUB SERPL-MCNC: 3.5 MG/DL (ref 0.1–1)
BLD PROD TYP BPU: NORMAL
BLOOD UNIT EXPIRATION DATE: NORMAL
BLOOD UNIT TYPE CODE: 600
BLOOD UNIT TYPE CODE: 6200
BLOOD UNIT TYPE CODE: 6200
BLOOD UNIT TYPE: NORMAL
BUN SERPL-MCNC: 49 MG/DL (ref 8–23)
BZE UR QL SCN: NEGATIVE
BZE UR QL SCN: NEGATIVE
CALCIUM SERPL-MCNC: 8.7 MG/DL (ref 8.7–10.5)
CANNABINOIDS UR QL SCN: NEGATIVE
CANNABINOIDS UR QL SCN: NEGATIVE
CHLORIDE SERPL-SCNC: 110 MMOL/L (ref 95–110)
CO2 SERPL-SCNC: 22 MMOL/L (ref 23–29)
CODING SYSTEM: NORMAL
CREAT SERPL-MCNC: 1.4 MG/DL (ref 0.5–1.4)
CREAT UR-MCNC: 76 MG/DL (ref 23–375)
CREAT UR-MCNC: 76 MG/DL (ref 23–375)
CRP SERPL-MCNC: 38.9 MG/L (ref 0–8.2)
DIFFERENTIAL METHOD: ABNORMAL
DISPENSE STATUS: NORMAL
EOSINOPHIL NFR BLD: 0 % (ref 0–8)
ERYTHROCYTE [DISTWIDTH] IN BLOOD BY AUTOMATED COUNT: 16 % (ref 11.5–14.5)
ERYTHROCYTE [SEDIMENTATION RATE] IN BLOOD BY WESTERGREN METHOD: 120 MM/HR (ref 0–10)
EST. GFR  (NO RACE VARIABLE): 53 ML/MIN/1.73 M^2
ETHANOL UR-MCNC: <10 MG/DL
GLUCOSE SERPL-MCNC: 108 MG/DL (ref 70–110)
HCT VFR BLD AUTO: 20.4 % (ref 40–54)
HGB BLD-MCNC: 7.2 G/DL (ref 14–18)
HIV 1+2 AB+HIV1 P24 AG SERPL QL IA: NORMAL
IMM GRANULOCYTES # BLD AUTO: ABNORMAL K/UL (ref 0–0.04)
IMM GRANULOCYTES NFR BLD AUTO: ABNORMAL % (ref 0–0.5)
IRON SERPL-MCNC: 151 UG/DL (ref 45–160)
LYMPHOCYTES NFR BLD: 8 % (ref 18–48)
MAGNESIUM SERPL-MCNC: 2.3 MG/DL (ref 1.6–2.6)
MCH RBC QN AUTO: 30.5 PG (ref 27–31)
MCHC RBC AUTO-ENTMCNC: 35.3 G/DL (ref 32–36)
MCV RBC AUTO: 86 FL (ref 82–98)
METHADONE UR QL SCN>300 NG/ML: NEGATIVE
METHADONE UR QL SCN>300 NG/ML: NEGATIVE
MONOCYTES NFR BLD: 3 % (ref 4–15)
NEUTROPHILS NFR BLD: 87 % (ref 38–73)
NEUTS BAND NFR BLD MANUAL: 2 %
NRBC BLD-RTO: 0 /100 WBC
NUM UNITS TRANS PACKED RBC: NORMAL
NUM UNITS TRANS PACKED RBC: NORMAL
OPIATES UR QL SCN: NEGATIVE
OPIATES UR QL SCN: NEGATIVE
OSMOLALITY UR: 476 MOSM/KG (ref 50–1200)
PCP UR QL SCN>25 NG/ML: NEGATIVE
PCP UR QL SCN>25 NG/ML: NEGATIVE
PHOSPHATE SERPL-MCNC: 3.7 MG/DL (ref 2.7–4.5)
PLATELET # BLD AUTO: 72 K/UL (ref 150–450)
PLATELET BLD QL SMEAR: ABNORMAL
PMV BLD AUTO: 11.9 FL (ref 9.2–12.9)
POCT GLUCOSE: 121 MG/DL (ref 70–110)
POTASSIUM SERPL-SCNC: 3.7 MMOL/L (ref 3.5–5.1)
PROT SERPL-MCNC: 6.6 G/DL (ref 6–8.4)
RBC # BLD AUTO: 2.36 M/UL (ref 4.6–6.2)
RPR SER QL: NORMAL
SATURATED IRON: 87 % (ref 20–50)
SODIUM SERPL-SCNC: 139 MMOL/L (ref 136–145)
SODIUM SERPL-SCNC: 140 MMOL/L (ref 136–145)
SODIUM SERPL-SCNC: 140 MMOL/L (ref 136–145)
SODIUM SERPL-SCNC: 141 MMOL/L (ref 136–145)
SODIUM SERPL-SCNC: 145 MMOL/L (ref 136–145)
TOTAL IRON BINDING CAPACITY: 173 UG/DL (ref 250–450)
TOXICOLOGY INFORMATION: NORMAL
TOXICOLOGY INFORMATION: NORMAL
TRANS ERYTHROCYTES VOL PATIENT: NORMAL ML
TRANSFERRIN SERPL-MCNC: 117 MG/DL (ref 200–375)
WBC # BLD AUTO: 5.19 K/UL (ref 3.9–12.7)

## 2022-10-07 PROCEDURE — 99900035 HC TECH TIME PER 15 MIN (STAT)

## 2022-10-07 PROCEDURE — 63600175 PHARM REV CODE 636 W HCPCS: Performed by: NURSE PRACTITIONER

## 2022-10-07 PROCEDURE — 85007 BL SMEAR W/DIFF WBC COUNT: CPT | Performed by: INTERNAL MEDICINE

## 2022-10-07 PROCEDURE — 95822 EEG COMA OR SLEEP ONLY: CPT

## 2022-10-07 PROCEDURE — 87040 BLOOD CULTURE FOR BACTERIA: CPT | Performed by: NURSE PRACTITIONER

## 2022-10-07 PROCEDURE — 94760 N-INVAS EAR/PLS OXIMETRY 1: CPT

## 2022-10-07 PROCEDURE — 94761 N-INVAS EAR/PLS OXIMETRY MLT: CPT

## 2022-10-07 PROCEDURE — 85027 COMPLETE CBC AUTOMATED: CPT | Performed by: INTERNAL MEDICINE

## 2022-10-07 PROCEDURE — 80053 COMPREHEN METABOLIC PANEL: CPT | Performed by: INTERNAL MEDICINE

## 2022-10-07 PROCEDURE — 95822 PR EEG,COMA/SLEEP RECORD ONLY: ICD-10-PCS | Mod: 26,,, | Performed by: PSYCHIATRY & NEUROLOGY

## 2022-10-07 PROCEDURE — 36430 TRANSFUSION BLD/BLD COMPNT: CPT

## 2022-10-07 PROCEDURE — 27201598 HC CASSETTE, BLOOD WARMER

## 2022-10-07 PROCEDURE — 36415 COLL VENOUS BLD VENIPUNCTURE: CPT | Performed by: NURSE PRACTITIONER

## 2022-10-07 PROCEDURE — 86140 C-REACTIVE PROTEIN: CPT | Performed by: INTERNAL MEDICINE

## 2022-10-07 PROCEDURE — 84100 ASSAY OF PHOSPHORUS: CPT | Performed by: INTERNAL MEDICINE

## 2022-10-07 PROCEDURE — 85652 RBC SED RATE AUTOMATED: CPT | Performed by: INTERNAL MEDICINE

## 2022-10-07 PROCEDURE — P9016 RBC LEUKOCYTES REDUCED: HCPCS | Performed by: NURSE PRACTITIONER

## 2022-10-07 PROCEDURE — 84295 ASSAY OF SERUM SODIUM: CPT | Performed by: INTERNAL MEDICINE

## 2022-10-07 PROCEDURE — 11000001 HC ACUTE MED/SURG PRIVATE ROOM

## 2022-10-07 PROCEDURE — 80074 ACUTE HEPATITIS PANEL: CPT | Performed by: NURSE PRACTITIONER

## 2022-10-07 PROCEDURE — 95822 EEG COMA OR SLEEP ONLY: CPT | Mod: 26,,, | Performed by: PSYCHIATRY & NEUROLOGY

## 2022-10-07 PROCEDURE — 83735 ASSAY OF MAGNESIUM: CPT | Performed by: INTERNAL MEDICINE

## 2022-10-07 RX ADMIN — LORAZEPAM 1 MG: 2 INJECTION INTRAMUSCULAR; INTRAVENOUS at 11:10

## 2022-10-07 RX ADMIN — CEFTRIAXONE 2 G: 2 INJECTION, SOLUTION INTRAVENOUS at 01:10

## 2022-10-07 NOTE — PHARMACY MED REC
"[facility]           Pharmacy  Admission Medication Reconciliation     The home medication history was taken by Stephanie Sierra PharmD.      Medication history obtained from Medications listed below were obtained from: Patient/family    Based on information gathered for medication list, you may go to "Admission" then "Reconcile Home Medications" tabs to review and/or act upon those items.     The home medication list has been updated by the Pharmacy department.   Please read ALL comments highlighted in yellow.   Please address this information as you see fit.    Feel free to contact us if you have any questions or require assistance.      No current facility-administered medications on file prior to encounter.     Current Outpatient Medications on File Prior to Encounter   Medication Sig Dispense Refill    diclofenac sodium (VOLTAREN) 1 % Gel Apply 4 g topically once daily. 50 g 3       Please address this information as you see fit.  Feel free to contact us if you have any questions or require assistance.    Josué AdornoD  [phone]                  .        "

## 2022-10-07 NOTE — PT/OT/SLP PROGRESS
Occupational Therapy  Visit Attempt x 2    Patient Name:  Author Rafael Lawson   MRN:  [id number]    Patient not seen today secondary to Other (Comment) (nsg reports pt received medication for MRI- needs to remain sedated at this time and requesting therapy return at a later time; in PM pt also in testing). Will follow-up as available.    10/7/2022

## 2022-10-07 NOTE — PT/OT/SLP PROGRESS
Physical Therapy Evaluation Attempt      Patient Name:  Author Rafael Lawson   MRN:  [id number]    Patient not seen today secondary to Other (Comment) (AM: Nsg reports pt given ativan and requested pt to remain sedated for MRI; PM: pt in EEG). Will follow-up as able/appropriate.    10/7/2022

## 2022-10-07 NOTE — ASSESSMENT & PLAN NOTE
Prominent periaortic and bilateral iliac and inguinal lymph nodes visualized.  Findings are concerning for lymphoma per CT ab/pel 9/24  Hgb 6.1, platelets 60, WBC 3.85  Anemia panel with low reticulocytes   B12, folate WNL   Stool negative for OCB 9/24  Transfuse 2 units PRBC 10/6, two MD signature needed for consent due to patient with AMS and no family contact available.   Consult Hemoc

## 2022-10-07 NOTE — ASSESSMENT & PLAN NOTE
Appears sig dehydrated but cannot tell us why he hasn't had a cup of water, he says in several days. Not sure of the validity of this. I suspect Etoh abuse.  MRI w/o acute infarct, but shows Left frontoparietal encephalomalacia. . Neuro checks and neuro c/s  UDS (negative) and etoh level (negative), PETH pending   PT/OT/ST/SW  -neurology on board   - EEG to assess for nonepileptic seizure activity   -ammonia (WNL) RPR (pending), HIV (nonreactive), ABG (mildly hypoxic, added supplemental O2)   -CIWA q 4 hours with PRN ativan if he is withdrawing from ETOH   -UA with rare bacteria- start IV rocephin    -MRA head and neck ordered

## 2022-10-07 NOTE — ASSESSMENT & PLAN NOTE
Mild elevation AST/ALT WNL, Tbili 3.5  AST elevated I suspect from etoh abuse   -will order liver US and hepatitis panel

## 2022-10-07 NOTE — PLAN OF CARE
[facility] - Telemetry  Initial Discharge Assessment       Primary Care Provider: Primary Doctor No    Admission Diagnosis: Shortness of breath [R06.02]  FRANK (acute kidney injury) [N17.9]  Syncope, cardiogenic [R55]  AMS (altered mental status) [R41.82]    Admission Date: 10/5/2022  Expected Discharge Date:     DCA done on unit with pt's nephew Filipe Doran. Pt lives at [facility]. Estranged from children, Son and daughter. Awaiting therapy recs for possible post acute placement. Referrals sent. Awaiting 142. Discussed possibility with nephew and if he has a preference it would be for the [facility] area if pt agreeable.    Discharge Barriers Identified: (P) Mental illness, No family/friends to help, Transportation    Payor: MEDICARE / Plan: MEDICARE PART A & B / Product Type: Government /     Extended Emergency Contact Information  Primary Emergency Contact: Filipe Doran  Mobile Phone: [phone]  Relation: Relative  Secondary Emergency Contact: Anil Hall  Mobile Phone: [phone]  Relation: Son    Discharge Plan A: (P) Skilled Nursing Facility       No Pharmacies Listed    Initial Assessment (most recent)       Adult Discharge Assessment - 10/07/22 1230          Discharge Assessment    Assessment Type Discharge Planning Assessment (P)      Source of Information family;health record (P)      Does patient/caregiver understand observation status Yes (P)      Lives With alone (P)    Pt lives at [facility] since August. Pt is estranged from adult children. Only one relative has come forward and completed assessement with [place].    Do you expect to return to your current living situation? No (P)      Do you have help at home or someone to help you manage your care at home? No (P)      Prior to hospitilization cognitive status: Not Oriented to Time;Not Oriented to Place (P)      Current cognitive status: Not Oriented to Time;Not Oriented to Place (P)      Walking or Climbing Stairs Difficulty ambulation difficulty,  "assistance 1 person (P)      Equipment Currently Used at Home none (P)      Discharge Plan A Skilled Nursing Facility (P)      Discharge Plan discussed with: -- (P)    Nephew Filipe mart is in contact with pt's children but they do not want to be involved in pt's care.    Discharge Barriers Identified Mental illness;No family/friends to help;Transportation (P)      SDOH Housing/Economic Concerns;Lack of Primary/family support (P)      Housing/Economic Concerns Yes (P)      Housing/Economic Concerns Inadequate Housing (P)      Lack of Primary/family support Yes (P)                    Future Appointments   Date Time Provider Department Center   10/11/2022  2:00 PM Licha Franco MD [facility] IMPRI [facility]   10/13/2022 11:20 AM Fransisco Raines MD [facility] HEM ONC [facility]     BP (!) 159/70   Pulse 99   Temp 97.6 °F (36.4 °C) (Axillary)   Resp 18   Ht 6' 1" (1.854 m)   Wt 101.6 kg (223 lb 15.8 oz)   SpO2 96%   BMI 29.55 kg/m²      cefTRIAXone (ROCEPHIN) IVPB  2 g Intravenous Q24H    folic acid  1 mg Oral Daily    multivitamin  1 tablet Oral Daily    thiamine  100 mg Oral Daily                  "

## 2022-10-07 NOTE — ASSESSMENT & PLAN NOTE
Patient with acute kidney injury likely due to IVVD/dehydration   Order urine stuides  IVF  Repeat chem  Limit nephrotoxins   -increase IVF  -also likely s/t anemia   -improving Cr 1.4

## 2022-10-07 NOTE — PLAN OF CARE
10/07/22 0939   Post-Acute Status   Post-Acute Authorization Placement   Post-Acute Placement Status Referrals Sent

## 2022-10-07 NOTE — CARE UPDATE
Attempted to call family Filipe Doran at [phone]. No answer. Left a message requesting a return call.

## 2022-10-07 NOTE — PROCEDURES
Date of service  10/07/2022    Introduction  Electroencephalographic (EEG) recording is performed with electrodes placed according to the International 10-20 placement system. Thirty two (32) channels of digital signal (sampling rate of 512/sec) including T1 and T2 was simultaneously recorded from the scalp and may include EKG, EMG, and/or eye monitors. Recording band pass was 0.1 to 512 Hz. Digital video recording of the patient is simultaneously recorded with the EEG. The patient is instructed to report clinical symptoms which may occur during the recording session. EEG and video recording is stored and archived in digital format. Activation procedures which include photic stimulation, hyperventilation and instructing patients to perform simple tasks are done in selected patients.    The EEG is displayed on a monitor screen and can be reviewed using different montages. Computer assisted analysis is employed to detect spike and electrographic seizure activity. The entire record is submitted for computer analysis. The entire recording is visually reviewed and, the times identified by computer analysis as being spikes or seizures are reviewed again.     Compressed spectral analysis (CSA) is also performed on the activity recorded from each individual channel. This is displayed as a power display of frequencies from 0 to 30 Hz over time. The CSA is reviewed looking for asymmetries in power between homologous areas of the scalp and then compared with the original EEG recording.     Findings  This short-term video EEG recording shows diffuse 2-4 Hz delta slowing with occasional theta rhythms seen. Infrequent generalized triphasic waves were present.     There was spontaneous variability of the background activity. There was poorly formed sleep architecture seen.     Activation procedures were not performed.     The EKG channel showed irregular rhythm.    Interpretation  This short-term video EEG shows a moderate/severe  degree of diffuse slowing and generalized triphasic waves.  These findings are consistent with a diffuse disturbance of cerebral function or encephalopathy secondary to metabolic, toxic, infectious, inflammatory, or other systemic processes. No potentially epileptogenic discharges or seizure activity are present during the recording.

## 2022-10-07 NOTE — SUBJECTIVE & OBJECTIVE
Interval hx: patient confused, garbled speech, awakes to sternal rub and mumbles, lethargic, protecting airway.     Review of Systems unable to obtain, patient with altered mental status.       Objective:     Vital Signs (Most Recent):  Temp: 97 °F (36.1 °C) (10/07/22 0806)  Pulse: 101 (10/07/22 0806)  Resp: 20 (10/07/22 0806)  BP: (!) 166/73 (10/07/22 0806)  SpO2: 96 % (10/07/22 0806)   Vital Signs (24h Range):  Temp:  [97 °F (36.1 °C)-98.7 °F (37.1 °C)] 97 °F (36.1 °C)  Pulse:  [] 101  Resp:  [14-21] 20  SpO2:  [95 %-100 %] 96 %  BP: (106-166)/(56-79) 166/73     Weight: 101.6 kg (223 lb 15.8 oz)  Body mass index is 29.55 kg/m².    Intake/Output Summary (Last 24 hours) at 10/7/2022 1103  Last data filed at 10/7/2022 0422  Gross per 24 hour   Intake 1230 ml   Output 1250 ml   Net -20 ml        Physical Exam  Constitutional:       General: He is not in acute distress.     Appearance: He is not diaphoretic.      Comments: Lethargic, confused, rises to sternal rub, protecting airway.    HENT:      Head: Normocephalic and atraumatic.      Nose: Nose normal.      Mouth/Throat:      Pharynx: Oropharynx is clear.   Eyes:      Conjunctiva/sclera: Conjunctivae normal.      Pupils: Pupils are equal, round, and reactive to light.   Cardiovascular:      Rate and Rhythm: Normal rate and regular rhythm.      Pulses: Normal pulses.      Heart sounds: Normal heart sounds.   Pulmonary:      Effort: Pulmonary effort is normal.      Breath sounds: Normal breath sounds.   Abdominal:      General: Abdomen is flat. Bowel sounds are normal.      Palpations: Abdomen is soft.   Musculoskeletal:      Cervical back: Normal range of motion and neck supple.   Skin:     General: Skin is warm and dry.   Neurological:      Mental Status: He is lethargic, disoriented and confused.      GCS: GCS eye subscore is 2. GCS verbal subscore is 2. GCS motor subscore is 4.      Cranial Nerves: Dysarthria present.   Psychiatric:         Behavior:  Behavior is not agitated.         Cognition and Memory: Cognition is impaired.         Judgment: Judgment is not impulsive.       Significant Labs: All pertinent labs within the past 24 hours have been reviewed.    Significant Imaging: I have reviewed all pertinent imaging results/findings within the past 24 hours.

## 2022-10-07 NOTE — PROGRESS NOTES
[facility] Medicine  Progress Note    Patient Name: Author Rafael Lawson  MRN: [id number]  Patient Class: IP- Inpatient   Admission Date: 10/5/2022  Length of Stay: 1 days  Attending Physician: Lukas Linares MD  Primary Care Provider: Primary Doctor No        Subjective:     Principal Problem:Syncope and collapse        HPI:  71-year-old male, homeless, unknown medical history, brought to the ED by EMS for altered mental status.  Patient is confused about his situation he alert and oriented to self and place.  Per nursing chart, patient was found in the hotel bathroom on the floor, covered in urine and feces.  Patient doesn't remember leading events, denied chest pain or dizziness prior to, no n/v. Says that he might have a fallen and woke up in a hotel bed then EMS brought him in here. Reports shortness of breath, bilaterally lower extremities weakness, unable to move them without assistant.  Denies chest pain, abdominal pain, nausea/vomiting, no alcohol or drug uses. DW ER MD.       Overview/Hospital Course:  No notes on file    Interval hx: patient confused, garbled speech, awakes to sternal rub and mumbles, lethargic, protecting airway.     Review of Systems unable to obtain, patient with altered mental status.       Objective:     Vital Signs (Most Recent):  Temp: 97 °F (36.1 °C) (10/07/22 0806)  Pulse: 101 (10/07/22 0806)  Resp: 20 (10/07/22 0806)  BP: (!) 166/73 (10/07/22 0806)  SpO2: 96 % (10/07/22 0806)   Vital Signs (24h Range):  Temp:  [97 °F (36.1 °C)-98.7 °F (37.1 °C)] 97 °F (36.1 °C)  Pulse:  [] 101  Resp:  [14-21] 20  SpO2:  [95 %-100 %] 96 %  BP: (106-166)/(56-79) 166/73     Weight: 101.6 kg (223 lb 15.8 oz)  Body mass index is 29.55 kg/m².    Intake/Output Summary (Last 24 hours) at 10/7/2022 1103  Last data filed at 10/7/2022 0422  Gross per 24 hour   Intake 1230 ml   Output 1250 ml   Net -20 ml        Physical Exam  Constitutional:       General: He is not in acute  distress.     Appearance: He is not diaphoretic.      Comments: Lethargic, confused, rises to sternal rub, protecting airway.    HENT:      Head: Normocephalic and atraumatic.      Nose: Nose normal.      Mouth/Throat:      Pharynx: Oropharynx is clear.   Eyes:      Conjunctiva/sclera: Conjunctivae normal.      Pupils: Pupils are equal, round, and reactive to light.   Cardiovascular:      Rate and Rhythm: Normal rate and regular rhythm.      Pulses: Normal pulses.      Heart sounds: Normal heart sounds.   Pulmonary:      Effort: Pulmonary effort is normal.      Breath sounds: Normal breath sounds.   Abdominal:      General: Abdomen is flat. Bowel sounds are normal.      Palpations: Abdomen is soft.   Musculoskeletal:      Cervical back: Normal range of motion and neck supple.   Skin:     General: Skin is warm and dry.   Neurological:      Mental Status: He is lethargic, disoriented and confused.      GCS: GCS eye subscore is 2. GCS verbal subscore is 2. GCS motor subscore is 4.      Cranial Nerves: Dysarthria present.   Psychiatric:         Behavior: Behavior is not agitated.         Cognition and Memory: Cognition is impaired.         Judgment: Judgment is not impulsive.       Significant Labs: All pertinent labs within the past 24 hours have been reviewed.    Significant Imaging: I have reviewed all pertinent imaging results/findings within the past 24 hours.      Assessment/Plan:      * Syncope and collapse  Tele  Cycle CE troponin elevated, however cardiology states Low suspicion for cardiac etiology      Had a recent echo 9/2022 HFpEF  Fall precautions     UTI (urinary tract infection)    -follow urine cx  -add rocephin    Encephalopathy, metabolic    See AMS     Discharge planning issues  Pt appeared disheveled at POC he's reported to be living in a hotel room and likely is abusing etoh.   May benefit from placement. SW for DC planning      Transaminitis  Mild elevation AST/ALT WNL, Tbili 3.5  AST elevated I  suspect from etoh abuse   -will order liver US and hepatitis panel       Hyponatremia  ?beer potomania. Appears chronic do not suspect it's c/t his AMS  Mey and Osm  Serial Na while giving isotonic saline in case this is siadh which I highly doubt    Na now 141          FRANK (acute kidney injury)  Patient with acute kidney injury likely due to IVVD/dehydration   Order urine stuides  IVF  Repeat chem  Limit nephrotoxins   -increase IVF  -also likely s/t anemia   -improving Cr 1.4    Intravascular volume depletion  Isotonic saline started   Monitor vol status       AMS (altered mental status)  Appears sig dehydrated but cannot tell us why he hasn't had a cup of water, he says in several days. Not sure of the validity of this. I suspect Etoh abuse.  MRI w/o acute infarct, but shows Left frontoparietal encephalomalacia. . Neuro checks and neuro c/s  UDS (negative) and etoh level (negative), PETH pending   PT/OT/ST/SW  -neurology on board   - EEG to assess for nonepileptic seizure activity   -ammonia (WNL) RPR (pending), HIV (nonreactive), ABG (mildly hypoxic, added supplemental O2)   -CIWA q 4 hours with PRN ativan if he is withdrawing from ETOH   -UA with rare bacteria- start IV rocephin    -MRA head and neck ordered       Pancytopenia    Prominent periaortic and bilateral iliac and inguinal lymph nodes visualized.  Findings are concerning for lymphoma per CT ab/pel 9/24  Hgb 6.1, platelets 60, WBC 3.85  Anemia panel with low reticulocytes   B12, folate WNL   Stool negative for OCB 9/24  Transfuse 2 units PRBC 10/6, two MD signature needed for consent due to patient with AMS and no family contact available.   Consult Hemoc     VTE Risk Mitigation (From admission, onward)         Ordered     IP VTE HIGH RISK PATIENT  Once         10/05/22 1609     Place sequential compression device  Until discontinued         10/05/22 1609                Discharge Planning   ANUSHA:      Code Status: Full Code   Is the patient medically  ready for discharge?:     Reason for patient still in hospital (select all that apply): Patient trending condition      Discharge Delays: None known at this time              Sheree Palma NP  Department of [facility]

## 2022-10-07 NOTE — PT/OT/SLP PROGRESS
Speech Language Pathology  Visit Attempt x2          Author Rafael Lawson  MRN: [id number]    1:20pm  Two attempts made to eval patient: Testing/imaging (xray/CT/MRI) in AM then EEG in afternoon, pt still not following commands. Will follow up with swallow eval next date.           10/7/2022

## 2022-10-08 LAB
ABO GROUP BLD: NORMAL
ALBUMIN SERPL BCP-MCNC: 2.2 G/DL (ref 3.5–5.2)
ALP SERPL-CCNC: 106 U/L (ref 55–135)
ALT SERPL W/O P-5'-P-CCNC: 30 U/L (ref 10–44)
AMMONIA PLAS-SCNC: 40 UMOL/L (ref 10–50)
ANION GAP SERPL CALC-SCNC: 9 MMOL/L (ref 8–16)
ANISOCYTOSIS BLD QL SMEAR: SLIGHT
AST SERPL-CCNC: 28 U/L (ref 10–40)
BASOPHILS # BLD AUTO: 0 K/UL (ref 0–0.2)
BASOPHILS NFR BLD: 0 % (ref 0–1.9)
BILIRUB SERPL-MCNC: 4.2 MG/DL (ref 0.1–1)
BLD GP AB SCN CELLS X3 SERPL QL: ABNORMAL
BLOOD GROUP ANTIBODIES SERPL: NORMAL
BUN SERPL-MCNC: 35 MG/DL (ref 8–23)
CALCIUM SERPL-MCNC: 8.7 MG/DL (ref 8.7–10.5)
CHLORIDE SERPL-SCNC: 118 MMOL/L (ref 95–110)
CO2 SERPL-SCNC: 22 MMOL/L (ref 23–29)
CREAT SERPL-MCNC: 1.2 MG/DL (ref 0.5–1.4)
DAT IGG-SP REAG RBC-IMP: NORMAL
DIFFERENTIAL METHOD: ABNORMAL
EOSINOPHIL # BLD AUTO: 0 K/UL (ref 0–0.5)
EOSINOPHIL NFR BLD: 0.2 % (ref 0–8)
ERYTHROCYTE [DISTWIDTH] IN BLOOD BY AUTOMATED COUNT: 16.2 % (ref 11.5–14.5)
EST. GFR  (NO RACE VARIABLE): >60 ML/MIN/1.73 M^2
GLUCOSE SERPL-MCNC: 120 MG/DL (ref 70–110)
HAV IGM SERPL QL IA: NORMAL
HBV CORE IGM SERPL QL IA: NORMAL
HBV SURFACE AG SERPL QL IA: NORMAL
HCT VFR BLD AUTO: 20.2 % (ref 40–54)
HCV AB SERPL QL IA: NORMAL
HGB BLD-MCNC: 6.9 G/DL (ref 14–18)
HYPOCHROMIA BLD QL SMEAR: ABNORMAL
IMM GRANULOCYTES # BLD AUTO: 0.08 K/UL (ref 0–0.04)
IMM GRANULOCYTES NFR BLD AUTO: 1.7 % (ref 0–0.5)
LYMPHOCYTES # BLD AUTO: 0.9 K/UL (ref 1–4.8)
LYMPHOCYTES NFR BLD: 19.5 % (ref 18–48)
MAGNESIUM SERPL-MCNC: 2.4 MG/DL (ref 1.6–2.6)
MCH RBC QN AUTO: 30.3 PG (ref 27–31)
MCHC RBC AUTO-ENTMCNC: 34.2 G/DL (ref 32–36)
MCV RBC AUTO: 89 FL (ref 82–98)
MONOCYTES # BLD AUTO: 0.3 K/UL (ref 0.3–1)
MONOCYTES NFR BLD: 6.1 % (ref 4–15)
NEUTROPHILS # BLD AUTO: 3.4 K/UL (ref 1.8–7.7)
NEUTROPHILS NFR BLD: 72.5 % (ref 38–73)
NRBC BLD-RTO: 0 /100 WBC
PHOSPHATE SERPL-MCNC: 3.7 MG/DL (ref 2.7–4.5)
PLATELET # BLD AUTO: 50 K/UL (ref 150–450)
PLATELET BLD QL SMEAR: ABNORMAL
PMV BLD AUTO: 11.7 FL (ref 9.2–12.9)
POTASSIUM SERPL-SCNC: 3.6 MMOL/L (ref 3.5–5.1)
PROT SERPL-MCNC: 6.6 G/DL (ref 6–8.4)
RBC # BLD AUTO: 2.28 M/UL (ref 4.6–6.2)
RH BLD: NORMAL
SODIUM SERPL-SCNC: 146 MMOL/L (ref 136–145)
SODIUM SERPL-SCNC: 149 MMOL/L (ref 136–145)
WBC # BLD AUTO: 4.72 K/UL (ref 3.9–12.7)

## 2022-10-08 PROCEDURE — 25000003 PHARM REV CODE 250: Performed by: INTERNAL MEDICINE

## 2022-10-08 PROCEDURE — 97530 THERAPEUTIC ACTIVITIES: CPT

## 2022-10-08 PROCEDURE — 85025 COMPLETE CBC W/AUTO DIFF WBC: CPT | Performed by: INTERNAL MEDICINE

## 2022-10-08 PROCEDURE — 63600175 PHARM REV CODE 636 W HCPCS: Performed by: NURSE PRACTITIONER

## 2022-10-08 PROCEDURE — 84295 ASSAY OF SERUM SODIUM: CPT | Performed by: INTERNAL MEDICINE

## 2022-10-08 PROCEDURE — 86850 RBC ANTIBODY SCREEN: CPT | Performed by: NURSE PRACTITIONER

## 2022-10-08 PROCEDURE — 93005 ELECTROCARDIOGRAM TRACING: CPT

## 2022-10-08 PROCEDURE — 93010 ELECTROCARDIOGRAM REPORT: CPT | Mod: ,,, | Performed by: INTERNAL MEDICINE

## 2022-10-08 PROCEDURE — 97535 SELF CARE MNGMENT TRAINING: CPT

## 2022-10-08 PROCEDURE — 86901 BLOOD TYPING SEROLOGIC RH(D): CPT | Performed by: NURSE PRACTITIONER

## 2022-10-08 PROCEDURE — 11000001 HC ACUTE MED/SURG PRIVATE ROOM

## 2022-10-08 PROCEDURE — 94760 N-INVAS EAR/PLS OXIMETRY 1: CPT

## 2022-10-08 PROCEDURE — 80053 COMPREHEN METABOLIC PANEL: CPT | Performed by: INTERNAL MEDICINE

## 2022-10-08 PROCEDURE — 86922 COMPATIBILITY TEST ANTIGLOB: CPT | Performed by: NURSE PRACTITIONER

## 2022-10-08 PROCEDURE — 83735 ASSAY OF MAGNESIUM: CPT | Performed by: INTERNAL MEDICINE

## 2022-10-08 PROCEDURE — 99900035 HC TECH TIME PER 15 MIN (STAT)

## 2022-10-08 PROCEDURE — 36415 COLL VENOUS BLD VENIPUNCTURE: CPT | Performed by: INTERNAL MEDICINE

## 2022-10-08 PROCEDURE — 82140 ASSAY OF AMMONIA: CPT | Performed by: NURSE PRACTITIONER

## 2022-10-08 PROCEDURE — 36415 COLL VENOUS BLD VENIPUNCTURE: CPT | Performed by: NURSE PRACTITIONER

## 2022-10-08 PROCEDURE — 25000003 PHARM REV CODE 250: Performed by: NURSE PRACTITIONER

## 2022-10-08 PROCEDURE — 93010 EKG 12-LEAD: ICD-10-PCS | Mod: ,,, | Performed by: INTERNAL MEDICINE

## 2022-10-08 PROCEDURE — 86905 BLOOD TYPING RBC ANTIGENS: CPT | Performed by: NURSE PRACTITIONER

## 2022-10-08 PROCEDURE — 97166 OT EVAL MOD COMPLEX 45 MIN: CPT

## 2022-10-08 PROCEDURE — 84100 ASSAY OF PHOSPHORUS: CPT | Performed by: INTERNAL MEDICINE

## 2022-10-08 PROCEDURE — 86870 RBC ANTIBODY IDENTIFICATION: CPT | Performed by: NURSE PRACTITIONER

## 2022-10-08 PROCEDURE — P9021 RED BLOOD CELLS UNIT: HCPCS | Performed by: NURSE PRACTITIONER

## 2022-10-08 PROCEDURE — 86900 BLOOD TYPING SEROLOGIC ABO: CPT | Performed by: NURSE PRACTITIONER

## 2022-10-08 PROCEDURE — 86880 COOMBS TEST DIRECT: CPT | Performed by: NURSE PRACTITIONER

## 2022-10-08 PROCEDURE — 92610 EVALUATE SWALLOWING FUNCTION: CPT

## 2022-10-08 PROCEDURE — 25500020 PHARM REV CODE 255: Performed by: INTERNAL MEDICINE

## 2022-10-08 PROCEDURE — 86902 BLOOD TYPE ANTIGEN DONOR EA: CPT | Performed by: NURSE PRACTITIONER

## 2022-10-08 PROCEDURE — 84295 ASSAY OF SERUM SODIUM: CPT | Mod: 91 | Performed by: INTERNAL MEDICINE

## 2022-10-08 RX ORDER — IBUPROFEN 400 MG/1
400 TABLET ORAL ONCE
Status: COMPLETED | OUTPATIENT
Start: 2022-10-08 | End: 2022-10-08

## 2022-10-08 RX ORDER — HYDROCODONE BITARTRATE AND ACETAMINOPHEN 500; 5 MG/1; MG/1
TABLET ORAL
Status: DISCONTINUED | OUTPATIENT
Start: 2022-10-08 | End: 2022-10-12 | Stop reason: HOSPADM

## 2022-10-08 RX ORDER — SODIUM CHLORIDE 9 MG/ML
INJECTION, SOLUTION INTRAVENOUS CONTINUOUS
Status: DISCONTINUED | OUTPATIENT
Start: 2022-10-08 | End: 2022-10-09

## 2022-10-08 RX ADMIN — LORAZEPAM 1 MG: 2 INJECTION INTRAMUSCULAR; INTRAVENOUS at 11:10

## 2022-10-08 RX ADMIN — SODIUM CHLORIDE: 0.9 INJECTION, SOLUTION INTRAVENOUS at 08:10

## 2022-10-08 RX ADMIN — FOLIC ACID 1 MG: 1 TABLET ORAL at 09:10

## 2022-10-08 RX ADMIN — IBUPROFEN 400 MG: 400 TABLET, FILM COATED ORAL at 09:10

## 2022-10-08 RX ADMIN — Medication 100 MG: at 09:10

## 2022-10-08 RX ADMIN — LORAZEPAM 1 MG: 2 INJECTION INTRAMUSCULAR; INTRAVENOUS at 05:10

## 2022-10-08 RX ADMIN — ACETAMINOPHEN 650 MG: 325 TABLET ORAL at 08:10

## 2022-10-08 RX ADMIN — CEFTRIAXONE 2 G: 2 INJECTION, SOLUTION INTRAVENOUS at 02:10

## 2022-10-08 RX ADMIN — THERA TABS 1 TABLET: TAB at 09:10

## 2022-10-08 RX ADMIN — IOHEXOL 100 ML: 350 INJECTION, SOLUTION INTRAVENOUS at 02:10

## 2022-10-08 NOTE — ASSESSMENT & PLAN NOTE
Prominent periaortic and bilateral iliac and inguinal lymph nodes visualized.  Findings are concerning for lymphoma per CT ab/pel 9/24  Hgb 6.1, platelets 60, WBC 3.85  Anemia panel with low reticulocytes   B12, folate WNL   Stool negative for OCB 9/24  Transfuse 2 units PRBC 10/6, two MD signature needed for consent due to patient with AMS and no family contact available.   Consult Hemoc     10/8 Hgb 6.9 - 1 unit PRBC ordered

## 2022-10-08 NOTE — NURSING
10/07/22 2004   Patient Request   Patient Requested AI ALERT   Nurse Notification   Charge Nurse Notified? Yes   Name of Charge Nurse DIOGENES Grove   Bedside Nurse Notified? Yes   Name of Bedside Nurse DIOGENES Ashton   Nurse Notfication Method Secure Chat   Nurse Notified Of Other  (AI ALERT)   Provider Notification   Provider Notified? Yes   Name of Provider NP Berta Flores   Provider Notification Method Secure Chat   Provider Notified Of AI Deterioration Alert

## 2022-10-08 NOTE — CARE UPDATE
"Spoke to patient's nephew Filipe at [phone]. I updated him on patient status, plan of care. He voiced understanding and all questions answered.   He states he was in his uncle's life about 5 years ago and knew him to drink about 2-3 beers daily, but never was "a drunk."  Patient's long term plan as per nephew, will be preferred as a nursing home in [place], [place].   Patient has an estranged son, Morgan who lives in [place], [place]. They are estranged due to relationship issues and patient stealing from son and selling his things.   He also has an ex-wife Lani, who is involved in his life, but had 4 strokes and is blind, so unable to care for him.  He also has a daughter who is on drugs.    Filpie will be here Monday with Lani to discuss with case management nursing home placement and initiate any necessary paperwork.   "

## 2022-10-08 NOTE — PROGRESS NOTES
1730    Received cta neck results from radiology. Reached out to neurologist on call. Awaiting return call or secure chat.     1745  Phoned transfer center at [facility] to discuss with neurologist or vascular surgeon if emergent transfer is necessary. Awaiting return call.     Discussed case with Attending Dr. Linares.     Sheree Palma, [facility]

## 2022-10-08 NOTE — PLAN OF CARE
Problem: SLP  Goal: SLP Goal  Description: STGs:  1. Pt will participate in clinical swallow assessment to determine safest and least restrictive diet level. -ongoing  2. Pt will safely tolerate a Full Liquid diet without overt s/sx of aspiration given 1:1 assist with feeding.   Outcome: Ongoing, Progressing   Bedside swallow study completed. Okay to advance to Full Liquids given 1:1 assist with meds crushed in puree + excellent oral care. SLP to follow.

## 2022-10-08 NOTE — AI DETERIORATION ALERT
Artificial Intelligence Notification  Randall      Admit Date: 10/5/2022  LOS: 2  Code Status: Full Code   Date of Consult: 10/08/2022  : 1950  Age: 71 y.o.  Weight:   Wt Readings from Last 1 Encounters:   10/07/22 101.6 kg (223 lb 15.8 oz)     Sex: male  Bed: K431/K431 A:   MRN: [id number]  Attending Physician: Lukas Linares MD  Primary Service: Networked reference to record PCT   Time AI Alert Received: 0743  Time at Bedside: 0745           Patient rises to voices, following commands today minimally, able to grasp with bilateral hands, some appropriate verbal responses, still disoriented x3. This is clinically improved from yesterday.     Lab Results   Component Value Date    WBC 4.72 10/08/2022    HGB 6.9 (L) 10/08/2022    HCT 20.2 (L) 10/08/2022    MCV 89 10/08/2022    PLT 50 (L) 10/08/2022       Hgb and platelets noted. No signs of bleeding.     Vital Signs (Most Recent):  Temp: 97.9 °F (36.6 °C) (10/08/22 075)  Pulse: 92 (10/08/22 075)  Resp: 18 (10/08/22 0755)  BP: (!) 124/58 (10/08/22 0755)  SpO2: (!) 93 % (10/08/22 0755)   Vital Signs (24h Range):  Temp:  [96.7 °F (35.9 °C)-98.6 °F (37 °C)] 97.9 °F (36.6 °C)  Pulse:  [] 92  Resp:  [18-26] 18  SpO2:  [93 %-97 %] 93 %  BP: ()/(55-73) 124/58         This encounter was triggered by an Artificial Intelligence Notification.     Artificial Intelligence alert discussed with Primary team:  Name myself and Dr. Linares.       Evaluation: stable and mildly clinically improved. Will transfuse PRBC 1 unit.     Disposition: to remain on telemetry unit with close monitoring.         Sheree Palma, [facility] Medicine

## 2022-10-08 NOTE — PROGRESS NOTES
"[facility] Medicine  Progress Note    Patient Name: Author Rafael Lawson  MRN: [id number]  Patient Class: IP- Inpatient   Admission Date: 10/5/2022  Length of Stay: 2 days  Attending Physician: Lukas Linares MD  Primary Care Provider: Primary Doctor No        Subjective:     Principal Problem:Syncope and collapse        HPI:  71-year-old male, homeless, unknown medical history, brought to the ED by EMS for altered mental status.  Patient is confused about his situation he alert and oriented to self and place.  Per nursing chart, patient was found in the hotel bathroom on the floor, covered in urine and feces.  Patient doesn't remember leading events, denied chest pain or dizziness prior to, no n/v. Says that he might have a fallen and woke up in a hotel bed then EMS brought him in here. Reports shortness of breath, bilaterally lower extremities weakness, unable to move them without assistant.  Denies chest pain, abdominal pain, nausea/vomiting, no alcohol or drug uses. DW ER MD.       Overview/Hospital Course:  No notes on file    Interval hx: patient confused, rises to voice, has some appropriate speech, follows minimal commands with bilateral hand grasps, is disoriented x3. Yells "stop" when I raise the head of his bed.   He tried to drink from a straw but is unable.     Review of Systems unable to obtain, patient with altered mental status.       Objective:     Vital Signs (Most Recent):  Temp: 97.9 °F (36.6 °C) (10/08/22 0755)  Pulse: 92 (10/08/22 0755)  Resp: 18 (10/08/22 0755)  BP: (!) 124/58 (10/08/22 0755)  SpO2: (!) 93 % (10/08/22 0755)   Vital Signs (24h Range):  Temp:  [96.7 °F (35.9 °C)-98.6 °F (37 °C)] 97.9 °F (36.6 °C)  Pulse:  [] 92  Resp:  [18-26] 18  SpO2:  [93 %-97 %] 93 %  BP: ()/(55-70) 124/58     Weight: 101.6 kg (223 lb 15.8 oz)  Body mass index is 29.55 kg/m².    Intake/Output Summary (Last 24 hours) at 10/8/2022 0958  Last data filed at 10/8/2022 0507  Gross per " 24 hour   Intake 75 ml   Output 0 ml   Net 75 ml        Physical Exam  Constitutional:       General: He is not in acute distress.     Appearance: He is not diaphoretic.      Comments: More awake today, following minimal commands, confused, rises to voice, protecting airway.    HENT:      Head: Normocephalic and atraumatic.      Nose: Nose normal.      Mouth/Throat:      Pharynx: Oropharynx is clear.   Eyes:      Conjunctiva/sclera: Conjunctivae normal.      Pupils: Pupils are equal, round, and reactive to light.   Cardiovascular:      Rate and Rhythm: Normal rate and regular rhythm.      Pulses: Normal pulses.      Heart sounds: Normal heart sounds.   Pulmonary:      Effort: Pulmonary effort is normal.      Breath sounds: Normal breath sounds.   Abdominal:      General: Abdomen is flat. Bowel sounds are normal.      Palpations: Abdomen is soft.      Comments: Mild ascites    Musculoskeletal:      Cervical back: Normal range of motion and neck supple.   Skin:     General: Skin is warm and dry.   Neurological:      Mental Status: He is lethargic, disoriented and confused.      GCS: GCS eye subscore is 3. GCS verbal subscore is 3. GCS motor subscore is 6.      Cranial Nerves: Dysarthria present.      Coordination: Coordination abnormal.   Psychiatric:         Behavior: Behavior is not agitated.         Cognition and Memory: Cognition is impaired.         Judgment: Judgment is not impulsive.       Significant Labs: All pertinent labs within the past 24 hours have been reviewed.    Significant Imaging: I have reviewed all pertinent imaging results/findings within the past 24 hours.      Assessment/Plan:      * Syncope and collapse  Tele  Cycle CE troponin elevated, however cardiology states Low suspicion for cardiac etiology      Had a recent echo 9/2022 HFpEF  Fall precautions     UTI (urinary tract infection)    -follow urine cx  -add rocephin    Encephalopathy, metabolic    See AMS     Discharge planning issues  Pt  appeared disheveled at POC he's reported to be living in a hotel room and likely is abusing etoh.   May benefit from placement. SW for DC planning      Transaminitis  Mild elevation AST/ALT WNL, Tbili 3.5  AST elevated I suspect from etoh abuse   -will order liver US:No acute abnormality or biliary ductal dilation.     and hepatitis panel (negative)   -repeat ammonia level WNL        Hyponatremia  ?beer potomania. Appears chronic do not suspect it's c/t his AMS  Mey and Osm  Serial Na while giving isotonic saline in case this is siadh which I highly doubt    Na now 141          FRANK (acute kidney injury)  Patient with acute kidney injury likely due to IVVD/dehydration   Order urine stuides  IVF  Repeat chem  Limit nephrotoxins   -increase IVF  -also likely s/t anemia   -improving Cr 1.2    Intravascular volume depletion  Isotonic saline started   Monitor vol status       AMS (altered mental status)  Appears sig dehydrated but cannot tell us why he hasn't had a cup of water, he says in several days. Not sure of the validity of this. I suspect Etoh abuse.  MRI w/o acute infarct, but shows Left frontoparietal encephalomalacia. . Neuro checks and neuro c/s  UDS (negative) and etoh level (negative), PETH pending   PT/OT/ST/SW  -neurology on board   - EEG to assess for nonepileptic seizure activity:  moderate/severe degree of diffuse slowing and generalized triphasic waves.  These findings are consistent with a diffuse disturbance of cerebral function or encephalopathy secondary to metabolic, toxic, infectious, inflammatory, or other systemic processes. No potentially epileptogenic discharges or seizure activity are present during the recording.   -ammonia (WNL) RPR (nonreactive), HIV (nonreactive), ABG (mildly hypoxic, added supplemental O2)   -CIWA q 4 hours with PRN ativan if he is withdrawing from ETOH   -UA with rare bacteria- start IV rocephin    -MRA head: Motion degraded examination, without compelling evidence of  high-grade stenosis or large vessel occlusion.   -CTA neck pending   -will discuss LP with neurology   -mental status mildly improved 10/8      Pancytopenia    Prominent periaortic and bilateral iliac and inguinal lymph nodes visualized.  Findings are concerning for lymphoma per CT ab/pel 9/24  Hgb 6.1, platelets 60, WBC 3.85  Anemia panel with low reticulocytes   B12, folate WNL   Stool negative for OCB 9/24  Transfuse 2 units PRBC 10/6, two MD signature needed for consent due to patient with AMS and no family contact available.   Consult Hemoc     10/8 Hgb 6.9 - 1 unit PRBC ordered       VTE Risk Mitigation (From admission, onward)         Ordered     IP VTE HIGH RISK PATIENT  Once         10/05/22 1609     Place sequential compression device  Until discontinued         10/05/22 1609                Discharge Planning   ANUSHA:      Code Status: Full Code   Is the patient medically ready for discharge?:     Reason for patient still in hospital (select all that apply): Patient trending condition  Discharge Plan A: Skilled Nursing Facility   Discharge Delays: None known at this time              Sheree Palma NP  Department of [facility]

## 2022-10-08 NOTE — AI DETERIORATION ALERT
Artificial Intelligence Notification  Randall      Admit Date: 10/5/2022  LOS: 1  Code Status: Full Code   Date of Consult: 10/07/2022  : 1950  Age: 71 y.o.  Weight:   Wt Readings from Last 1 Encounters:   10/07/22 101.6 kg (223 lb 15.8 oz)     Sex: male  [facility]:   MRN: [id number]  Attending Physician: Lukas Linares MD  Primary Service: Networked reference to record PCT   Time AI Alert Received: ***  Time at Bedside: ***           ***      Vital Signs (Most Recent):  Temp: 98.6 °F (37 °C) (10/07/22 1957)  Pulse: 101 (10/07/22 1957)  Resp: (Abnormal) 32 (10/07/22 1957)  BP: (Abnormal) 120/58 (10/07/22 1957)  SpO2: 95 % (10/07/22 1957)   Vital Signs (24h Range):  Temp:  [97 °F (36.1 °C)-98.7 °F (37.1 °C)] 98.6 °F (37 °C)  Pulse:  [] 101  Resp:  [18-32] 32  SpO2:  [95 %-100 %] 95 %  BP: (119-166)/(58-79) 120/58         This encounter was triggered by an Artificial Intelligence Notification.     Artificial Intelligence alert discussed with Primary team:  Name ***      Evaluation: ***    Disposition: ***

## 2022-10-08 NOTE — ASSESSMENT & PLAN NOTE
Appears sig dehydrated but cannot tell us why he hasn't had a cup of water, he says in several days. Not sure of the validity of this. I suspect Etoh abuse.  MRI w/o acute infarct, but shows Left frontoparietal encephalomalacia. . Neuro checks and neuro c/s  UDS (negative) and etoh level (negative), PETH pending   PT/OT/ST/SW  -neurology on board   - EEG to assess for nonepileptic seizure activity:  moderate/severe degree of diffuse slowing and generalized triphasic waves.  These findings are consistent with a diffuse disturbance of cerebral function or encephalopathy secondary to metabolic, toxic, infectious, inflammatory, or other systemic processes. No potentially epileptogenic discharges or seizure activity are present during the recording.   -ammonia (WNL) RPR (nonreactive), HIV (nonreactive), ABG (mildly hypoxic, added supplemental O2)   -CIWA q 4 hours with PRN ativan if he is withdrawing from ETOH   -UA with rare bacteria- start IV rocephin    -MRA head: Motion degraded examination, without compelling evidence of high-grade stenosis or large vessel occlusion.   -CTA neck pending   -will discuss LP with neurology   -mental status mildly improved 10/8

## 2022-10-08 NOTE — PLAN OF CARE
Problem: Occupational Therapy  Goal: Occupational Therapy Goal  Description: Goals to be met by: 11/08/2022      Patient will increase functional independence with ADLs by performing:    UE Dressing with Modified [place].  LE Dressing with Modified [place].  Grooming while standing with Modified [place].  Toileting from toilet with Modified [place] for hygiene and clothing management.   Toilet transfer to toilet with Modified [place].  Increased functional strength to WFL for self care.  Upper extremity exercise program x10 reps per handout, with supervision.     Outcome: Ongoing, Progressing  Pt would benefit from continued OT to address deficits in self care and functional mobility. Recommending post acute placement; DME needs TBD pending progress with therapies

## 2022-10-08 NOTE — SUBJECTIVE & OBJECTIVE
"Interval hx: patient confused, rises to voice, has some appropriate speech, follows minimal commands with bilateral hand grasps, is disoriented x3. Yells "stop" when I raise the head of his bed.   He tried to drink from a straw but is unable.     Review of Systems unable to obtain, patient with altered mental status.       Objective:     Vital Signs (Most Recent):  Temp: 97.9 °F (36.6 °C) (10/08/22 0755)  Pulse: 92 (10/08/22 0755)  Resp: 18 (10/08/22 0755)  BP: (!) 124/58 (10/08/22 0755)  SpO2: (!) 93 % (10/08/22 0755)   Vital Signs (24h Range):  Temp:  [96.7 °F (35.9 °C)-98.6 °F (37 °C)] 97.9 °F (36.6 °C)  Pulse:  [] 92  Resp:  [18-26] 18  SpO2:  [93 %-97 %] 93 %  BP: ()/(55-70) 124/58     Weight: 101.6 kg (223 lb 15.8 oz)  Body mass index is 29.55 kg/m².    Intake/Output Summary (Last 24 hours) at 10/8/2022 0958  Last data filed at 10/8/2022 0505  Gross per 24 hour   Intake 75 ml   Output 0 ml   Net 75 ml        Physical Exam  Constitutional:       General: He is not in acute distress.     Appearance: He is not diaphoretic.      Comments: More awake today, following minimal commands, confused, rises to voice, protecting airway.    HENT:      Head: Normocephalic and atraumatic.      Nose: Nose normal.      Mouth/Throat:      Pharynx: Oropharynx is clear.   Eyes:      Conjunctiva/sclera: Conjunctivae normal.      Pupils: Pupils are equal, round, and reactive to light.   Cardiovascular:      Rate and Rhythm: Normal rate and regular rhythm.      Pulses: Normal pulses.      Heart sounds: Normal heart sounds.   Pulmonary:      Effort: Pulmonary effort is normal.      Breath sounds: Normal breath sounds.   Abdominal:      General: Abdomen is flat. Bowel sounds are normal.      Palpations: Abdomen is soft.      Comments: Mild ascites    Musculoskeletal:      Cervical back: Normal range of motion and neck supple.   Skin:     General: Skin is warm and dry.   Neurological:      Mental Status: He is lethargic, " disoriented and confused.      GCS: GCS eye subscore is 3. GCS verbal subscore is 3. GCS motor subscore is 6.      Cranial Nerves: Dysarthria present.      Coordination: Coordination abnormal.   Psychiatric:         Behavior: Behavior is not agitated.         Cognition and Memory: Cognition is impaired.         Judgment: Judgment is not impulsive.       Significant Labs: All pertinent labs within the past 24 hours have been reviewed.    Significant Imaging: I have reviewed all pertinent imaging results/findings within the past 24 hours.

## 2022-10-08 NOTE — PT/OT/SLP PROGRESS
Physical Therapy    Missed Visit/ Evaluation  Patient Name:  Author Rafael MachadoaTyla   MRN:  [id number]    Patient not seen today secondary to Patient unwilling to participate, Increased agitation, extreme confusion, keeping eyes closed with incoherent speech. Will follow-up as able.  Michell Kim, PT  10/8/2022

## 2022-10-08 NOTE — ASSESSMENT & PLAN NOTE
Mild elevation AST/ALT WNL, Tbili 3.5  AST elevated I suspect from etoh abuse   -will order liver US:No acute abnormality or biliary ductal dilation.     and hepatitis panel (negative)   -repeat ammonia level WNL

## 2022-10-08 NOTE — AI DETERIORATION ALERT
Artificial Intelligence Notification  Randall      Admit Date: 10/5/2022  LOS: 2  Code Status: Full Code   Date of Consult: 10/08/2022  : 1950  Age: 71 y.o.  Weight:   Wt Readings from Last 1 Encounters:   10/07/22 101.6 kg (223 lb 15.8 oz)     Sex: male  [facility]: [facility]:   MRN: [id number]  Attending Physician: Lukas Linares MD  Primary Service: Networked reference to record PCT   Time AI Alert Received: ***  Time at Bedside: ***           ***      Vital Signs (Most Recent):  Temp: 96.7 °F (35.9 °C) (10/08/22 0506)  Pulse: 94 (10/08/22 0506)  Resp: (!) 26 (10/08/22 0506)  BP: (!) 110/55 (10/08/22 0506)  SpO2: (!) 94 % (10/08/22 0735)   Vital Signs (24h Range):  Temp:  [96.7 °F (35.9 °C)-98.6 °F (37 °C)] 96.7 °F (35.9 °C)  Pulse:  [] 94  Resp:  [18-26] 26  SpO2:  [94 %-97 %] 94 %  BP: ()/(55-73) 110/55         This encounter was triggered by an Artificial Intelligence Notification.     Artificial Intelligence alert discussed with Primary team:  Name ***      Evaluation: ***    Disposition: ***

## 2022-10-08 NOTE — PT/OT/SLP EVAL
Occupational Therapy   Evaluation    Name: Author Rafael aLwson  MRN: [id number]  Admitting Diagnosis:  Syncope and collapse  Recent Surgery: * No surgery found *      Recommendations:     Discharge Recommendations:  (post acute placement)  Discharge Equipment Recommendations:   (TBD)  Barriers to discharge:  Decreased caregiver support    Assessment:     Author Rafael Lawson is a 71 y.o. male with a medical diagnosis of Syncope and collapse.  He presents with confusion, deconditioning, dysarthria. Performance deficits affecting function: weakness, impaired endurance, impaired self care skills, impaired sensation, impaired functional mobility, gait instability, decreased lower extremity function, decreased upper extremity function, decreased coordination, impaired cognition, visual deficits, impaired balance, decreased safety awareness, pain, decreased ROM, impaired coordination, impaired fine motor, impaired skin, edema.      Rehab Prognosis: Fair; patient would benefit from acute skilled OT services to address these deficits and reach maximum level of function.       Plan:     Patient to be seen 3 x/week to address the above listed problems via self-care/home management, therapeutic activities, therapeutic exercises  Plan of Care Expires: 11/11/22  Plan of Care Reviewed with: patient    Subjective     Chief Complaint: Pt difficulty to understand but c/o pain in BLE with touch and repositioning  Patient/Family Comments/goals: Did not state    Occupational Profile:  Living Environment: Per chart review, pt with no fixed address but staying in a hotel  Previous level of function: Mod I for ADLs and functional mobility per pt report (nodding yes/no to most questions)  Roles and Routines: Caretaker to self  Equipment Used at Home:   (per chart review, none but pt unable to state)  Assistance upon Discharge: None reported    Pain/Comfort:  Pain Rating 1:  (Pain reported with repositioning c/b yelling/shouting but unable to  quantify)    Patients cultural, spiritual, Zoroastrianism conflicts given the current situation:      Objective:     Communicated with: nsg prior to session.  Patient found right sidelying with bed alarm (telemetry present in room but not attached to pt, possibly pulled PIV line as well as R crux of elbow with blood but no line present, blood on bed.) upon OT entry to room.    General Precautions: Standard, aspiration, fall, vision impaired   Orthopedic Precautions:N/A   Braces: N/A      Occupational Performance:    Bed Mobility:    Patient completed Rolling/Turning to Left with  maximal assistance and total assistance  Patient completed Rolling/Turning to Right with maximal assistance and total assistance  Patient completed Scooting/Bridging with total assistance  Patient completed Supine to Sit with total assistance  Patient completed Sit to Supine with total assistance    Functional Mobility/Transfers:  Functional Mobility: Pt with fair- to poor+ static and dynamic seated balance.     Activities of Daily Living:  Upper Body Dressing: moderate assistance and maximal assistance to don gown seated EOB  Lower Body Dressing: total assistance to don/doff B socks supine in bed, attempted to doff B socks seated EOB but poor dynamic seated balance   Toileting: total assistance, dependence, and of 2 persons to maintain sidelying in bed during pericare    Cognitive/Visual Perceptual:  Cognitive/Psychosocial Skills:     -       Oriented to: First name only, able to state  with increased v/c  -       Follows Commands/attention:Follows one step commands ~50 % of time bt requires physical and verbal cues  -       Communication: dysarthric, provided with mouth moisturizer as pt with continued open mouth posture throughout session  -       Memory: Impaired STM, LTM, immediate recall  -       Safety awareness/insight to disability: impaired  -       Mood/Affect/Coping skills/emotional control: Guarded, agitated    Physical  Exam:  Postural examination/scapula alignment:    -       Rounded shoulders, forward head  Skin integrity: multiple areas of skin breakdown including along spine and R heel (Others not visualized this session per chart review) nsg notified and aware  Motor Planning:    -       WFL  Dominant hand:    -       L handed  Upper Extremity Range of Motion: BUE WFL except B shoulder flexion AROM ~0-90*, AAROM WFL    Upper Extremity Strength:  BUE grossly 3 to 3+/5 except 2-/5 B shoulder flexion   Strength:  B hands WFL  Fine Motor Coordination:    -       Impaired in functional task practice but may be 2/2 current cognition  Gross motor coordination:  notable difficulty managing BLE    AMPAC 6 Click ADL:  AMPAC Total Score: 10    Treatment & Education:  Pt educated on role of OT and POC.   Pt performing skills as listed above.     Patient left right sidelying with all lines intact, call button in reach, bed alarm on, nsg notified, and nsg present    GOALS:   Multidisciplinary Problems       Occupational Therapy Goals          Problem: Occupational Therapy    Goal Priority Disciplines Outcome Interventions   Occupational Therapy Goal     OT, PT/OT Ongoing, Progressing    Description: Goals to be met by: 11/08/2022      Patient will increase functional independence with ADLs by performing:    UE Dressing with Modified [place].  LE Dressing with Modified [place].  Grooming while standing with Modified [place].  Toileting from toilet with Modified [place] for hygiene and clothing management.   Toilet transfer to toilet with Modified [place].  Increased functional strength to WFL for self care.  Upper extremity exercise program x10 reps per handout, with supervision.                          History:     No past medical history on file.    No past surgical history on file.    Time Tracking:     OT Date of Treatment: 10/08/22  OT Start Time: 1226  OT Stop Time: 1311  OT Total Time (min): 45  min    Billable Minutes:Evaluation 15  Self Care/Home Management 15  Therapeutic Activity 15    10/8/2022

## 2022-10-08 NOTE — CARE UPDATE
Regarding CTA results.       I spoke Dr. Schmidt interventional neurology at [facility] cell [phone] - no intervention needed     I spoke to Dr. Plata V[facility] Neurology at [facility]- recommends antiplatelet if able and medical management, no intervention needed.       Sheree Palma, [facility]

## 2022-10-08 NOTE — PT/OT/SLP EVAL
Speech Language Pathology Evaluation  Bedside Swallow    Patient Name:  Author Rafael Lawson   MRN:  [id number]  Admitting Diagnosis: Syncope and collapse    Recommendations:                 General Recommendations:  Dysphagia therapy, ongoing swallow assessment, and Cognitive-linguistic evaluation pending improvement in mentation  Diet recommendations:  Full liquids, Thin   Aspiration Precautions: 1 bite/sip at a time, Alternating bites/sips, Assistance with meals, Avoid talking while eating, Check for pocketing/oral residue, Eliminate distractions, Feed only when awake/alert, Frequent oral care, HOB to 90 degrees, Meds crushed in puree, Monitor for s/s of aspiration, Remain upright 30 minutes post meal, Small bites/sips, and Standard aspiration precautions   General Precautions: Standard, aspiration, fall, vision impaired  Communication strategies:  Provide oral care/water as needed to improve oral mucosa + improve speech intelligibility. Re-orient as needed.     History:   HPI: 71-year-old male, homeless, unknown medical history, brought to the ED by EMS for altered mental status.  Patient is confused about his situation he alert and oriented to self and place.  Per nursing chart, patient was found in the hotel bathroom on the floor, covered in urine and feces.  Patient doesn't remember leading events, denied chest pain or dizziness prior to, no n/v. Says that he might have a fallen and woke up in a hotel bed then EMS brought him in here. Reports shortness of breath, bilaterally lower extremities weakness, unable to move them without assistant.  Denies chest pain, abdominal pain, nausea/vomiting, no alcohol or drug uses. XOCHILT ER MD.   No past medical history on file.    No past surgical history on file.    Social History: Patient lives at a hotel.    Prior Intubation HX:  none this admit.    Modified Barium Swallow: none per EMR.     Chest X-Rays: FINDINGS:   Cardiac size remains normal.  Interstitial markings in the  "lungs have cleared some over the last 2 weeks although there is still slightly prominent.  No pleural effusion is seen.     Prior diet: Pt unable to report.    Subjective     Pt seen at the bedside for clinical swallow assessment. SLP did check w/ RNMeenakshi, prior to visit. Pt asleep in bed, wearing only his brief + pressure offloading boots. Pt awakened to verbal stimuli.   Patient goals: "Ma'am, another favor. I need uh... swing my legs over here."      Pain/Comfort:  Pain Rating 1:  (Pain reported with repositioning c/b yelling/shouting)    Respiratory Status: Room air    Objective:     Oral Musculature Evaluation  Oral Musculature: general weakness, unable to assess due to poor participation/comprehension  Dentition: edentulous, uses dentures to eat  Secretion Management: adequate  Mucosal Quality: dry, cracked  Mandibular Strength and Mobility:  (unable to assess)  Lingual Strength and Mobility:  (large tongue which at times occludes airway with speech)  Voice Prior to PO Intake:  (unintelligible due to severely dry oral mucosa)    Bedside Swallow Eval: Open mouth resting posture w/ severely dry/cracked oral mucosa, limiting speech intelligibility. Significantly improved speech clarity following water administration; however, speech remained mostly nonsensical. Intermittent periods of abnormal airflow during speech c/b tongue occluding airway. Pt did not respond to questioning re: snoring/sleep apnea.   Consistencies Assessed:  Thin liquids : ice chips x2, straw sips water x4oz, straw sips powerade x7  Puree : tsp bites pudding x4    Soft solids deferred due to AMS    Oral Phase:   Dry mouth  Slow oral transit time  Reduced bolus control/manipulation  No anterior spillage  No significant oral residue during exam; however, RN reported pocketing of AM meds    Pharyngeal Phase:   no overt clinical signs/symptoms of aspiration  no overt clinical signs/symptoms of pharyngeal dysphagia  Timely swallow initiation, " adequate hyolaryngeal lift/excursion  No coughing/choking, throat clearing, or s/sx of aspiration  Slightly congested sounding breath sounds/voice--cued in cough which was effective/strong    Compensatory Strategies  Redirect to PO trials as needed  Multiple swallows  Volitional cough/throat clear    Treatment: Pt will benefit from SLP tx 3x/week while inpatient to ensure swallow safety, advance diet as able, and assess/tx cognitive function pending improvement in AMS.     Assessment:     Author Rafael Lawson is a 71 y.o. male with an admit diagnosis of Syncope and collapse. Per clinical swallow assessment, pt remains altered, though did agree to PO trials. Pt deemed safe to advance to Full Liquids given 1:1 assist with meds crushed in puree + excellent oral care. SLP to follow.     Goals:   Multidisciplinary Problems       SLP Goals          Problem: SLP    Goal Priority Disciplines Outcome   SLP Goal     SLP Ongoing, Progressing   Description: STGs:  1. Pt will participate in clinical swallow assessment to determine safest and least restrictive diet level. -ongoing  2. Pt will safely tolerate a Full Liquid diet without overt s/sx of aspiration given 1:1 assist with feeding.                        Plan:     Patient to be seen:  3 x/week   Plan of Care expires:  11/06/22  Plan of Care reviewed with:  patient (RN and MD)   SLP Follow-Up:  Yes       Discharge recommendations:   (TBD)   Barriers to Discharge:  Safety Awareness and deficit insight     Time Tracking:     SLP Treatment Date:   10/08/22  Speech Start Time:  1028  Speech Stop Time:  1040     Speech Total Time (min):  12 min    Billable Minutes: Eval Swallow and Oral Function 12 mins    10/08/2022

## 2022-10-08 NOTE — PLAN OF CARE
10/08/22 0737   Patient Request   Patient Requested AI alert   Nurse Notification   Charge Nurse Notified? Yes   Name of Charge Nurse Verenice   Bedside Nurse Notified? Yes   Name of Bedside Nurse Meenakshi   Nurse Notfication Method Secure Chat   Nurse Notified Of Other   Provider Notification   Provider Notified? Yes   Name of Provider Dr Linares   Provider Notified Of AI Deterioration Alert

## 2022-10-08 NOTE — PROGRESS NOTES
"NEUROLOGY FLOOR CONSULT    Reason for consult:  AMS        Other sources of information : patient, past medical records, and ER records    CC:  "Hip pain"    HPI:   Author Rafael Lawson is a 71 y.o. year old male, with pancytopenia, diffuse lymphadenopathy of unknown significance and   Anemia is brought into the ED by EMS for the evaluation of altered mental status.  Per nursing chart, patient was found in the hotel bathroom on the floor, covered in urine and feces.  Patient does not remember anything leading to this event.  Patient was confused about his situation but he is alert and oriented to self and place. Patient has widespread lymphadenopathy worrisome for lymphoma. Patient's recent echo showed heart failure with preserved ejection fraction.  CBC showed hemoglobin of 6.1. Neurology was consulted for the evaluation of AMS. Patient was found to have Hypovolemia/hyponatremia with concern for beer potomania. MRI brain showed L frontoparietal encephalomalacia. On examination, patient seems to have returned to his baseline with mild drowsiness.       SUBJECTIVE   Patient was seen and examined at the bedside this morning. He is alert and oriented to self, place and president. He was requesting for water during the interview. He states that he is doing okay. He followed commands promptly in all 4 extremities. He was not able to see anything from the R eye, but able to say "2 fingers" from the L eye. He has baseline severe dysarthria. Patient was answering all of the questions initially, but got agitated with multiple questions.     Histories:     Allergies:  Patient has no known allergies.    Current Medications:    Current Facility-Administered Medications   Medication Dose Route Frequency Provider Last Rate Last Admin    0.9%  NaCl infusion (for blood administration)   Intravenous Q24H PRN Sheree Palma NP        0.9%  NaCl infusion (for blood administration)   Intravenous Q24H PRN Sheree Palma NP     "    acetaminophen tablet 650 mg  650 mg Oral Q8H PRN Kevin Cervantes MD        albuterol-ipratropium 2.5 mg-0.5 mg/3 mL nebulizer solution 3 mL  3 mL Nebulization Q4H PRN Kevin Cervantes MD        bisacodyL suppository 10 mg  10 mg Rectal Daily PRN Kevin Cervantes MD        cefTRIAXone (ROCEPHIN) 2 g/50 mL D5W IVPB  2 g Intravenous Q24H Sheree Palma, NP   Stopped at 10/07/22 1400    dextrose 10% bolus 125 mL  12.5 g Intravenous PRN Kevin Cervantes MD        dextrose 10% bolus 250 mL  25 g Intravenous PRN Kevin Cervantes MD        folic acid tablet 1 mg  1 mg Oral Daily Kevin Cervantes MD   1 mg at 10/08/22 0900    glucagon (human recombinant) injection 1 mg  1 mg Intramuscular PRN Kevin Cervantes MD        glucose chewable tablet 16 g  16 g Oral PRN Kevin Cervantes MD        glucose chewable tablet 24 g  24 g Oral PRN Kevin Cervantes MD        HYDROcodone-acetaminophen 5-325 mg per tablet 1 tablet  1 tablet Oral Q6H PRN Kevin Cervantes MD        LORazepam injection 1 mg  1 mg Intravenous Q4H PRN Sheree Palma NP   1 mg at 10/08/22 0503    melatonin tablet 6 mg  6 mg Oral Nightly PRN Kevin Cervantes MD   6 mg at 10/05/22 2107    multivitamin tablet  1 tablet Oral Daily Kevin Cervantes MD   1 tablet at 10/08/22 0900    naloxone 0.4 mg/mL injection 0.02 mg  0.02 mg Intravenous PRN Kevin Cervantes MD        ondansetron disintegrating tablet 8 mg  8 mg Oral Q8H PRN Kevin Cervantes MD   8 mg at 10/06/22 1506    polyethylene glycol packet 17 g  17 g Oral TID PRN Kevin Cervantes MD        prochlorperazine injection Soln 5 mg  5 mg Intravenous Q6H PRN Kevin Cervantes MD        simethicone chewable tablet 80 mg  1 tablet Oral QID PRN Kevin Cervantes MD        sodium chloride 0.9% flush 2 mL  2 mL Intravenous Q12H PRN Kevin Cervantes MD        thiamine tablet 100 mg  100 mg Oral Daily  Kevin Cervantes MD   100 mg at 10/08/22 0900       Past Medical/Surgical/Family History:  Medical: No past medical history on file.   Surgeries: No past surgical history on file.   Family: No family history on file.,         Current Evaluation:     Vital Signs:   Vitals:    10/08/22 0755   BP: (!) 124/58   Pulse: 92   Resp: 18   Temp: 97.9 °F (36.6 °C)      ORIENTATION: Oriented to self (name and ), place and current president     MEMORY: Poor recent and remote memory    LANGUAGE: 2=Severe aphasia; all communication is through fragmentary expression; great need for inference, questioning, and guessing by the listener. Range of information that can be exchanged is limited; listener carries burden of communication. Examiner cannot identify materials provided from patient response.     CRANIAL NERVES:  Decreased to no vision on R, as he is not blinking to the threat and could see fingers on examination   No facial asymmetry   Tracking examiner in the room     MOTOR:  Pronator drift: absent  Strength grossly intact in all 4   Following commands; showed 2 fingers from RUE and RLE. Wiggles toes b/l on command     Tone: normal      SENSORY:  Grossly normal to light touch     CEREBELLAR/GAIT:  Finger to nose:Unable to examined   Gait: Deferred for the patients safety     LABORATORY STUDIES:  Recent Results (from the past 24 hour(s))   Sodium    Collection Time: 10/07/22  6:49 PM   Result Value Ref Range    Sodium 145 136 - 145 mmol/L   Blood culture    Collection Time: 10/07/22  6:49 PM    Specimen: Blood   Result Value Ref Range    Blood Culture, Routine No Growth to date    Blood culture    Collection Time: 10/07/22  6:49 PM    Specimen: Blood   Result Value Ref Range    Blood Culture, Routine No Growth to date    Sodium    Collection Time: 10/08/22 12:26 AM   Result Value Ref Range    Sodium 146 (H) 136 - 145 mmol/L   Comprehensive Metabolic Panel (CMP)    Collection Time: 10/08/22  6:35 AM   Result Value Ref  Range    Sodium 149 (H) 136 - 145 mmol/L    Potassium 3.6 3.5 - 5.1 mmol/L    Chloride 118 (H) 95 - 110 mmol/L    CO2 22 (L) 23 - 29 mmol/L    Glucose 120 (H) 70 - 110 mg/dL    BUN 35 (H) 8 - 23 mg/dL    Creatinine 1.2 0.5 - 1.4 mg/dL    Calcium 8.7 8.7 - 10.5 mg/dL    Total Protein 6.6 6.0 - 8.4 g/dL    Albumin 2.2 (L) 3.5 - 5.2 g/dL    Total Bilirubin 4.2 (H) 0.1 - 1.0 mg/dL    Alkaline Phosphatase 106 55 - 135 U/L    AST 28 10 - 40 U/L    ALT 30 10 - 44 U/L    Anion Gap 9 8 - 16 mmol/L    eGFR >60 >60 mL/min/1.73 m^2   Magnesium    Collection Time: 10/08/22  6:35 AM   Result Value Ref Range    Magnesium 2.4 1.6 - 2.6 mg/dL   Phosphorus    Collection Time: 10/08/22  6:35 AM   Result Value Ref Range    Phosphorus 3.7 2.7 - 4.5 mg/dL   CBC with Automated Differential    Collection Time: 10/08/22  6:35 AM   Result Value Ref Range    WBC 4.72 3.90 - 12.70 K/uL    RBC 2.28 (L) 4.60 - 6.20 M/uL    Hemoglobin 6.9 (L) 14.0 - 18.0 g/dL    Hematocrit 20.2 (L) 40.0 - 54.0 %    MCV 89 82 - 98 fL    MCH 30.3 27.0 - 31.0 pg    MCHC 34.2 32.0 - 36.0 g/dL    RDW 16.2 (H) 11.5 - 14.5 %    Platelets 50 (L) 150 - 450 K/uL    MPV 11.7 9.2 - 12.9 fL    Immature Granulocytes 1.7 (H) 0.0 - 0.5 %    Gran # (ANC) 3.4 1.8 - 7.7 K/uL    Immature Grans (Abs) 0.08 (H) 0.00 - 0.04 K/uL    Lymph # 0.9 (L) 1.0 - 4.8 K/uL    Mono # 0.3 0.3 - 1.0 K/uL    Eos # 0.0 0.0 - 0.5 K/uL    Baso # 0.00 0.00 - 0.20 K/uL    nRBC 0 0 /100 WBC    Gran % 72.5 38.0 - 73.0 %    Lymph % 19.5 18.0 - 48.0 %    Mono % 6.1 4.0 - 15.0 %    Eosinophil % 0.2 0.0 - 8.0 %    Basophil % 0.0 0.0 - 1.9 %    Platelet Estimate Decreased (A)     Aniso Slight     Hypo Occasional     Differential Method Automated    Sodium    Collection Time: 10/08/22  6:35 AM   Result Value Ref Range    Sodium 149 (H) 136 - 145 mmol/L   Type & Screen    Collection Time: 10/08/22  8:32 AM   Result Value Ref Range    Indirect Al POS (A)    Direct antiglobulin test    Collection Time: 10/08/22   8:32 AM   Result Value Ref Range    Direct Al (POORNIMA) POS    Group & Rh    Collection Time: 10/08/22  8:32 AM   Result Value Ref Range    Rh Type POS    Ammonia    Collection Time: 10/08/22  9:22 AM   Result Value Ref Range    Ammonia 40 10 - 50 umol/L   Sodium    Collection Time: 10/08/22 11:47 AM   Result Value Ref Range    Sodium 149 (H) 136 - 145 mmol/L         RADIOLOGY STUDIES:  I have personally reviewed the images performed.     HEAD CT:   1. No acute intracranial findings.  2. Left frontoparietal encephalomalacia gliosis for which clinical correlation recommended.    BRAIN MRI     There is no evidence of restricted diffusion to suggest an acute infarction.     Ventricles are normal in size for age without evidence of hydrocephalus.  There is left frontoparietal encephalomalacia, likely related to a remote infarction.  There are calcifications along the posterior falx.  There are T2/FLAIR hyperintensities in the supratentorial white matter, compatible with mild chronic microvascular ischemic changes.  No mass, hemorrhage, or recent or remote major vascular distribution infarct.  No extra-axial blood or fluid collections. Normal vascular flow voids.     Bone marrow signal intensity is normal. Paranasal sinuses and mastoid air cells are clear.      Assessment:  P    Rafael Lawson is a 71 y.o. year old male, with pancytopenia, diffuse lymphadenopathy of unknown significance and anemia is here for the evaluation of syncope and acute encephalopathy, which could be 2/2 dehydration vs cardiogenic syncope vs seizures. Patient does have L frontoparietal encephalomalacia and that puts him at risk of seizures; however, we suspect that dehydration, anemia, UTI and  metabolic derangements could be main contributors here. For better assessment CTA head and neck were obtained, official read suggests 60% L carotid bifurcation stenosis, and R vert narrowed and irregular at the C1 and is critically  narrowed/occluded as  it enters foramen magnum. Radiologist suggests clinical suspicion of underlying dissection. Given the recent fall? Vascular neurology consult is recommended. EEG brain showed mod-severe diffuse slowing and gen triphasic waves suggestive of toxic/infectious/metabolic derangements. No seizures or epileptogenic focus seen.     Treatment Plan  Correct underlying infectious/metabolic derangements- On Rocephin   Normonatremia, normoxia and normotension   Consult Vascular neurology for the concerns of dissection  EKG reviewed. Echo from 9/23 showed EF of 60% with mild LAE, and mild AS  Fall precautions   Goals of care discussion   Palliative and Heme/Onc on board       Differential diagnosis was explained to the patient. All questions were answered. Patient understood and agreed to adhere to plan.       Case discussed with Dr. Schneider    Appreciate the consult.      Elaine Quintanilla- PGY IV  [employer] [facility]

## 2022-10-08 NOTE — ASSESSMENT & PLAN NOTE
Patient with acute kidney injury likely due to IVVD/dehydration   Order urine stuides  IVF  Repeat chem  Limit nephrotoxins   -increase IVF  -also likely s/t anemia   -improving Cr 1.2

## 2022-10-09 LAB
ALBUMIN SERPL BCP-MCNC: 2.2 G/DL (ref 3.5–5.2)
ALP SERPL-CCNC: 108 U/L (ref 55–135)
ALT SERPL W/O P-5'-P-CCNC: 32 U/L (ref 10–44)
ANION GAP SERPL CALC-SCNC: 8 MMOL/L (ref 8–16)
ANISOCYTOSIS BLD QL SMEAR: SLIGHT
AST SERPL-CCNC: 27 U/L (ref 10–40)
BACTERIA #/AREA URNS HPF: NORMAL /HPF
BASOPHILS # BLD AUTO: 0.01 K/UL (ref 0–0.2)
BASOPHILS NFR BLD: 0.2 % (ref 0–1.9)
BILIRUB SERPL-MCNC: 7.2 MG/DL (ref 0.1–1)
BILIRUB UR QL STRIP: ABNORMAL
BUN SERPL-MCNC: 27 MG/DL (ref 8–23)
CALCIUM SERPL-MCNC: 9 MG/DL (ref 8.7–10.5)
CHLORIDE SERPL-SCNC: 122 MMOL/L (ref 95–110)
CLARITY UR: ABNORMAL
CO2 SERPL-SCNC: 23 MMOL/L (ref 23–29)
COLOR UR: YELLOW
CREAT SERPL-MCNC: 1.1 MG/DL (ref 0.5–1.4)
DIFFERENTIAL METHOD: ABNORMAL
EOSINOPHIL # BLD AUTO: 0 K/UL (ref 0–0.5)
EOSINOPHIL NFR BLD: 0.8 % (ref 0–8)
ERYTHROCYTE [DISTWIDTH] IN BLOOD BY AUTOMATED COUNT: 16.8 % (ref 11.5–14.5)
EST. GFR  (NO RACE VARIABLE): >60 ML/MIN/1.73 M^2
GLUCOSE SERPL-MCNC: 119 MG/DL (ref 70–110)
GLUCOSE UR QL STRIP: NEGATIVE
HCT VFR BLD AUTO: 21.8 % (ref 40–54)
HGB BLD-MCNC: 7.4 G/DL (ref 14–18)
HGB UR QL STRIP: ABNORMAL
HYALINE CASTS #/AREA URNS LPF: 0 /LPF
HYPOCHROMIA BLD QL SMEAR: ABNORMAL
IMM GRANULOCYTES # BLD AUTO: 0.08 K/UL (ref 0–0.04)
IMM GRANULOCYTES NFR BLD AUTO: 1.5 % (ref 0–0.5)
KETONES UR QL STRIP: NEGATIVE
LEUKOCYTE ESTERASE UR QL STRIP: NEGATIVE
LYMPHOCYTES # BLD AUTO: 0.4 K/UL (ref 1–4.8)
LYMPHOCYTES NFR BLD: 7.7 % (ref 18–48)
MCH RBC QN AUTO: 31.4 PG (ref 27–31)
MCHC RBC AUTO-ENTMCNC: 33.9 G/DL (ref 32–36)
MCV RBC AUTO: 92 FL (ref 82–98)
MICROSCOPIC COMMENT: NORMAL
MONOCYTES # BLD AUTO: 0.2 K/UL (ref 0.3–1)
MONOCYTES NFR BLD: 4.1 % (ref 4–15)
NEUTROPHILS # BLD AUTO: 4.4 K/UL (ref 1.8–7.7)
NEUTROPHILS NFR BLD: 85.7 % (ref 38–73)
NITRITE UR QL STRIP: NEGATIVE
NRBC BLD-RTO: 0 /100 WBC
PETH 16:0/18.1 (POPETH): <10 NG/ML
PETH 16:0/18.2 (PLPETH): <10 NG/ML
PH UR STRIP: 6 [PH] (ref 5–8)
PLATELET # BLD AUTO: 39 K/UL (ref 150–450)
PLATELET BLD QL SMEAR: ABNORMAL
PMV BLD AUTO: 12.7 FL (ref 9.2–12.9)
POTASSIUM SERPL-SCNC: 3.6 MMOL/L (ref 3.5–5.1)
PROT SERPL-MCNC: 7 G/DL (ref 6–8.4)
PROT UR QL STRIP: ABNORMAL
RBC # BLD AUTO: 2.36 M/UL (ref 4.6–6.2)
RBC #/AREA URNS HPF: 0 /HPF (ref 0–4)
SODIUM SERPL-SCNC: 149 MMOL/L (ref 136–145)
SODIUM SERPL-SCNC: 151 MMOL/L (ref 136–145)
SODIUM SERPL-SCNC: 152 MMOL/L (ref 136–145)
SODIUM SERPL-SCNC: 153 MMOL/L (ref 136–145)
SODIUM SERPL-SCNC: 153 MMOL/L (ref 136–145)
SP GR UR STRIP: 1.02 (ref 1–1.03)
URN SPEC COLLECT METH UR: ABNORMAL
UROBILINOGEN UR STRIP-ACNC: ABNORMAL EU/DL
WBC # BLD AUTO: 5.17 K/UL (ref 3.9–12.7)
WBC #/AREA URNS HPF: 1 /HPF (ref 0–5)

## 2022-10-09 PROCEDURE — 84295 ASSAY OF SERUM SODIUM: CPT | Mod: 91 | Performed by: INTERNAL MEDICINE

## 2022-10-09 PROCEDURE — 63600175 PHARM REV CODE 636 W HCPCS: Performed by: NURSE PRACTITIONER

## 2022-10-09 PROCEDURE — 36415 COLL VENOUS BLD VENIPUNCTURE: CPT | Performed by: INTERNAL MEDICINE

## 2022-10-09 PROCEDURE — 63600175 PHARM REV CODE 636 W HCPCS: Performed by: HOSPITALIST

## 2022-10-09 PROCEDURE — 87040 BLOOD CULTURE FOR BACTERIA: CPT | Performed by: NURSE PRACTITIONER

## 2022-10-09 PROCEDURE — 97162 PT EVAL MOD COMPLEX 30 MIN: CPT

## 2022-10-09 PROCEDURE — 81000 URINALYSIS NONAUTO W/SCOPE: CPT | Performed by: NURSE PRACTITIONER

## 2022-10-09 PROCEDURE — 97530 THERAPEUTIC ACTIVITIES: CPT

## 2022-10-09 PROCEDURE — 85025 COMPLETE CBC W/AUTO DIFF WBC: CPT | Performed by: INTERNAL MEDICINE

## 2022-10-09 PROCEDURE — 11000001 HC ACUTE MED/SURG PRIVATE ROOM

## 2022-10-09 PROCEDURE — 25000003 PHARM REV CODE 250: Performed by: NURSE PRACTITIONER

## 2022-10-09 PROCEDURE — C1751 CATH, INF, PER/CENT/MIDLINE: HCPCS

## 2022-10-09 PROCEDURE — 25000003 PHARM REV CODE 250: Performed by: INTERNAL MEDICINE

## 2022-10-09 PROCEDURE — 25000003 PHARM REV CODE 250: Performed by: HOSPITALIST

## 2022-10-09 PROCEDURE — 76937 US GUIDE VASCULAR ACCESS: CPT

## 2022-10-09 PROCEDURE — 80053 COMPREHEN METABOLIC PANEL: CPT | Performed by: NURSE PRACTITIONER

## 2022-10-09 PROCEDURE — 36410 VNPNXR 3YR/> PHY/QHP DX/THER: CPT

## 2022-10-09 RX ORDER — DEXTROSE MONOHYDRATE 50 MG/ML
INJECTION, SOLUTION INTRAVENOUS CONTINUOUS
Status: DISCONTINUED | OUTPATIENT
Start: 2022-10-09 | End: 2022-10-10

## 2022-10-09 RX ORDER — VANCOMYCIN HCL IN 5 % DEXTROSE 1G/250ML
1000 PLASTIC BAG, INJECTION (ML) INTRAVENOUS
Status: DISCONTINUED | OUTPATIENT
Start: 2022-10-09 | End: 2022-10-11

## 2022-10-09 RX ORDER — PREDNISONE 20 MG/1
40 TABLET ORAL DAILY
Status: DISCONTINUED | OUTPATIENT
Start: 2022-10-09 | End: 2022-10-12 | Stop reason: HOSPADM

## 2022-10-09 RX ORDER — SODIUM CHLORIDE, SODIUM LACTATE, POTASSIUM CHLORIDE, CALCIUM CHLORIDE 600; 310; 30; 20 MG/100ML; MG/100ML; MG/100ML; MG/100ML
INJECTION, SOLUTION INTRAVENOUS CONTINUOUS
Status: DISCONTINUED | OUTPATIENT
Start: 2022-10-09 | End: 2022-10-09

## 2022-10-09 RX ORDER — SODIUM CHLORIDE 450 MG/100ML
INJECTION, SOLUTION INTRAVENOUS CONTINUOUS
Status: DISCONTINUED | OUTPATIENT
Start: 2022-10-09 | End: 2022-10-09

## 2022-10-09 RX ORDER — FAMOTIDINE 20 MG/1
20 TABLET, FILM COATED ORAL 2 TIMES DAILY
Status: DISCONTINUED | OUTPATIENT
Start: 2022-10-09 | End: 2022-10-12 | Stop reason: HOSPADM

## 2022-10-09 RX ORDER — DEXTROSE MONOHYDRATE 100 MG/ML
INJECTION, SOLUTION INTRAVENOUS CONTINUOUS
Status: DISCONTINUED | OUTPATIENT
Start: 2022-10-09 | End: 2022-10-09

## 2022-10-09 RX ADMIN — FOLIC ACID 1 MG: 1 TABLET ORAL at 09:10

## 2022-10-09 RX ADMIN — VANCOMYCIN HYDROCHLORIDE 1000 MG: 1 INJECTION, POWDER, LYOPHILIZED, FOR SOLUTION INTRAVENOUS at 09:10

## 2022-10-09 RX ADMIN — VANCOMYCIN HYDROCHLORIDE 2250 MG: 500 INJECTION, POWDER, LYOPHILIZED, FOR SOLUTION INTRAVENOUS at 02:10

## 2022-10-09 RX ADMIN — PREDNISONE 40 MG: 20 TABLET ORAL at 04:10

## 2022-10-09 RX ADMIN — THERA TABS 1 TABLET: TAB at 09:10

## 2022-10-09 RX ADMIN — PIPERACILLIN AND TAZOBACTAM 4.5 G: 4; .5 INJECTION, POWDER, LYOPHILIZED, FOR SOLUTION INTRAVENOUS; PARENTERAL at 05:10

## 2022-10-09 RX ADMIN — Medication 100 MG: at 09:10

## 2022-10-09 RX ADMIN — DEXTROSE: 5 SOLUTION INTRAVENOUS at 05:10

## 2022-10-09 RX ADMIN — PIPERACILLIN AND TAZOBACTAM 4.5 G: 4; .5 INJECTION, POWDER, LYOPHILIZED, FOR SOLUTION INTRAVENOUS; PARENTERAL at 09:10

## 2022-10-09 RX ADMIN — FAMOTIDINE 20 MG: 20 TABLET ORAL at 09:10

## 2022-10-09 RX ADMIN — SODIUM CHLORIDE: 0.9 INJECTION, SOLUTION INTRAVENOUS at 04:10

## 2022-10-09 NOTE — PROCEDURES
"Author Rafael Lawson is a 71 y.o. male patient.    Temp: 97.2 °F (36.2 °C) (10/09/22 1110)  Pulse: 97 (10/09/22 1212)  Resp: 17 (10/09/22 1110)  BP: (!) 115/59 (10/09/22 1110)  SpO2: 95 % (10/09/22 1110)  Weight: 101.6 kg (223 lb 15.8 oz) (10/07/22 0600)  Height: 6' 1" (185.4 cm) (10/05/22 1148)    PICC  Date/Time: 10/9/2022 1:47 PM  Consent Done: Yes  Time out: Immediately prior to procedure a time out was called to verify the correct patient, procedure, equipment, support staff and site/side marked as required  Indications: med administration  Anesthesia: local infiltration  Local anesthetic: lidocaine 1% without epinephrine  Anesthetic Total (mL): 3  Preparation: skin prepped with ChloraPrep  Skin prep agent dried: skin prep agent completely dried prior to procedure  Sterile barriers: all five maximum sterile barriers used - cap, mask, sterile gown, sterile gloves, and large sterile sheet  Hand hygiene: hand hygiene performed prior to central venous catheter insertion  Location details: right basilic  Catheter type: single lumen  Catheter size: 4 Fr  Catheter Length: 11cm    Ultrasound guidance: yes  Vessel Caliber: medium and patent, compressibility normal  Vascular Doppler: not done  Needle advanced into vessel with real time Ultrasound guidance.  Sterile sheath used.  no esophageal manometryNumber of attempts: 1  Post-procedure: blood return through all ports and chlorhexidine patch  Estimated blood loss (mL): 0  Specimens: No  Implants: No  Assessment: successful placement  Complications: none  Comments: Site wrapped with coban as pt has been pulliong pivs out according to DIOGENES Arrieta        Name DIOGENES Calderon  10/9/2022    "

## 2022-10-09 NOTE — PLAN OF CARE
VN Note: Labs, Notes, Orders, Care Plan review will be available as needed.   Problem: Adult Inpatient Plan of Care  Goal: Plan of Care Review  Outcome: Ongoing, Progressing

## 2022-10-09 NOTE — NURSING
ROBERTO Vela notified that pt has temp 100.6'F when the blood is finished, pt is warm to touch. Pt confused, NP okay to give tylenol now for low grade fever. Will cont to monitor.     Post transfusion temp 101.6'F axillary, ROBERTO Vela notified. Ibuprofen 400mg x 1 ordered,. Will cont to monitor.

## 2022-10-09 NOTE — SUBJECTIVE & OBJECTIVE
"Interval hx: patient confused, rises to voice, has some appropriate speech, not following commands, is disoriented x3. Yells "stop" when I raise the head of his bed.   Febrile overnight.       Review of Systems unable to obtain, patient with altered mental status.       Objective:     Vital Signs (Most Recent):  Temp: 97.6 °F (36.4 °C) (10/09/22 0827)  Pulse: 104 (10/09/22 0827)  Resp: 18 (10/09/22 0827)  BP: (!) 122/58 (10/09/22 0827)  SpO2: (!) 94 % (10/09/22 0827)   Vital Signs (24h Range):  Temp:  [97.4 °F (36.3 °C)-101.6 °F (38.7 °C)] 97.6 °F (36.4 °C)  Pulse:  [] 104  Resp:  [18-26] 18  SpO2:  [92 %-99 %] 94 %  BP: (105-185)/() 122/58     Weight: 101.6 kg (223 lb 15.8 oz)  Body mass index is 29.55 kg/m².    Intake/Output Summary (Last 24 hours) at 10/9/2022 0916  Last data filed at 10/9/2022 0718  Gross per 24 hour   Intake 1697.81 ml   Output --   Net 1697.81 ml        Physical Exam  Constitutional:       General: He is not in acute distress.     Appearance: He is not diaphoretic.      Comments: More awake today, confused, rises to voice, protecting airway.    HENT:      Head: Normocephalic and atraumatic.      Nose: Nose normal.      Mouth/Throat:      Pharynx: Oropharynx is clear.   Eyes:      Conjunctiva/sclera: Conjunctivae normal.      Pupils: Pupils are equal, round, and reactive to light.   Cardiovascular:      Rate and Rhythm: Normal rate and regular rhythm.      Pulses: Normal pulses.      Heart sounds: Normal heart sounds.   Pulmonary:      Effort: Pulmonary effort is normal.      Breath sounds: Normal breath sounds.   Abdominal:      General: Abdomen is flat. Bowel sounds are normal.      Palpations: Abdomen is soft.      Comments: Mild ascites    Musculoskeletal:      Cervical back: Normal range of motion and neck supple.   Skin:     General: Skin is warm and dry.   Neurological:      Mental Status: He is lethargic, disoriented and confused.      GCS: GCS eye subscore is 3. GCS verbal " subscore is 3. GCS motor subscore is 5.      Cranial Nerves: Dysarthria present.      Coordination: Coordination abnormal.   Psychiatric:         Behavior: Behavior is not agitated.         Cognition and Memory: Cognition is impaired.         Judgment: Judgment is not impulsive.       Significant Labs: All pertinent labs within the past 24 hours have been reviewed.    Significant Imaging: I have reviewed all pertinent imaging results/findings within the past 24 hours.

## 2022-10-09 NOTE — PROGRESS NOTES
"[facility] Medicine  Progress Note    Patient Name: Author Rafael Lawson  MRN: [id number]  Patient Class: IP- Inpatient   Admission Date: 10/5/2022  Length of Stay: 3 days  Attending Physician: Dayne Roman, *  Primary Care Provider: Primary Doctor No        Subjective:     Principal Problem:Syncope and collapse        HPI:  71-year-old male, homeless, unknown medical history, brought to the ED by EMS for altered mental status.  Patient is confused about his situation he alert and oriented to self and place.  Per nursing chart, patient was found in the hotel bathroom on the floor, covered in urine and feces.  Patient doesn't remember leading events, denied chest pain or dizziness prior to, no n/v. Says that he might have a fallen and woke up in a hotel bed then EMS brought him in here. Reports shortness of breath, bilaterally lower extremities weakness, unable to move them without assistant.  Denies chest pain, abdominal pain, nausea/vomiting, no alcohol or drug uses. DW ER MD.       Overview/Hospital Course:  No notes on file    Interval hx: patient confused, rises to voice, has some appropriate speech, not following commands, is disoriented x3. Yells "stop" when I raise the head of his bed.   Febrile overnight.       Review of Systems unable to obtain, patient with altered mental status.       Objective:     Vital Signs (Most Recent):  Temp: 97.6 °F (36.4 °C) (10/09/22 0827)  Pulse: 104 (10/09/22 0827)  Resp: 18 (10/09/22 0827)  BP: (!) 122/58 (10/09/22 0827)  SpO2: (!) 94 % (10/09/22 0827)   Vital Signs (24h Range):  Temp:  [97.4 °F (36.3 °C)-101.6 °F (38.7 °C)] 97.6 °F (36.4 °C)  Pulse:  [] 104  Resp:  [18-26] 18  SpO2:  [92 %-99 %] 94 %  BP: (105-185)/() 122/58     Weight: 101.6 kg (223 lb 15.8 oz)  Body mass index is 29.55 kg/m².    Intake/Output Summary (Last 24 hours) at 10/9/2022 0916  Last data filed at 10/9/2022 0718  Gross per 24 hour   Intake 1697.81 ml   Output -- "   Net 1697.81 ml        Physical Exam  Constitutional:       General: He is not in acute distress.     Appearance: He is not diaphoretic.      Comments: More awake today, confused, rises to voice, protecting airway.    HENT:      Head: Normocephalic and atraumatic.      Nose: Nose normal.      Mouth/Throat:      Pharynx: Oropharynx is clear.   Eyes:      Conjunctiva/sclera: Conjunctivae normal.      Pupils: Pupils are equal, round, and reactive to light.   Cardiovascular:      Rate and Rhythm: Normal rate and regular rhythm.      Pulses: Normal pulses.      Heart sounds: Normal heart sounds.   Pulmonary:      Effort: Pulmonary effort is normal.      Breath sounds: Normal breath sounds.   Abdominal:      General: Abdomen is flat. Bowel sounds are normal.      Palpations: Abdomen is soft.      Comments: Mild ascites    Musculoskeletal:      Cervical back: Normal range of motion and neck supple.   Skin:     General: Skin is warm and dry.   Neurological:      Mental Status: He is lethargic, disoriented and confused.      GCS: GCS eye subscore is 3. GCS verbal subscore is 3. GCS motor subscore is 5.      Cranial Nerves: Dysarthria present.      Coordination: Coordination abnormal.   Psychiatric:         Behavior: Behavior is not agitated.         Cognition and Memory: Cognition is impaired.         Judgment: Judgment is not impulsive.       Significant Labs: All pertinent labs within the past 24 hours have been reviewed.    Significant Imaging: I have reviewed all pertinent imaging results/findings within the past 24 hours.      Assessment/Plan:      * Syncope and collapse  Tele  Cycle CE troponin elevated, however cardiology states Low suspicion for cardiac etiology      Had a recent echo 9/2022 HFpEF  Fall precautions     UTI (urinary tract infection)    -follow urine cx  -add rocephin    10/9 abx escalated     Encephalopathy, metabolic    See AMS     Discharge planning issues  Pt appeared disheveled at POC he's  reported to be living in a hotel room and likely is abusing etoh.   May benefit from placement. SW for DC planning    -patient's nephew would like him to be in a nursing home in [place]. He is coming Monday along with patient's ex wife to begin planning.   Patient may also be a hospice candidate.       Transaminitis  Mild elevation AST/ALT WNL, Tbili 3.5  AST elevated I suspect from etoh abuse   -will order liver US:No acute abnormality or biliary ductal dilation.     and hepatitis panel (negative)   -repeat ammonia level WNL        Hyponatremia  ?beer potomania. Appears chronic do not suspect it's c/t his AMS  Mey and Osm  Serial Na while giving isotonic saline in case this is siadh which I highly doubt    Na now 141     10/9 Na 151- start d10 gtt at 50 ml/hr, recheck na in 4 hours          FRANK (acute kidney injury)  Patient with acute kidney injury likely due to IVVD/dehydration   Order urine stuides  IVF  Repeat chem  Limit nephrotoxins   -increase IVF  -also likely s/t anemia   -improving Cr 1.2    Intravascular volume depletion  Isotonic saline started - holding saline with hypernatremia   Monitor vol status       AMS (altered mental status)  Appears sig dehydrated but cannot tell us why he hasn't had a cup of water, he says in several days. Not sure of the validity of this. I suspect Etoh abuse.  MRI w/o acute infarct, but shows Left frontoparietal encephalomalacia. . Neuro checks and neuro c/s  UDS (negative) and etoh level (negative), PETH (negative)  PT/OT/ST/SW  -neurology on board   - EEG to assess for nonepileptic seizure activity:  moderate/severe degree of diffuse slowing and generalized triphasic waves.  These findings are consistent with a diffuse disturbance of cerebral function or encephalopathy secondary to metabolic, toxic, infectious, inflammatory, or other systemic processes. No potentially epileptogenic discharges or seizure activity are present during the recording.   -ammonia (WNL) RPR  (nonreactive), HIV (nonreactive), ABG (mildly hypoxic, added supplemental O2)   -CIWA q 4 hours with PRN ativan if he is withdrawing from ETOH - stopped   -UA with rare bacteria- start IV rocephin    -MRA head: Motion degraded examination, without compelling evidence of high-grade stenosis or large vessel occlusion.   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 discussed with  Vascular neurology who does not recommend  Intervention due to no acute stroke and would medically manage. Cannot anticoagulate at this time due to pancytopenia.   -will discuss LP with neurology - neuro recs: no LP at this time, official consult to vascular neurology, MRI brain w w/o contrast to assess for meningeal enhancements   -mental status mildly improved 10/8, slightly worse 10/9  -febrile overnight, redraw blood cx, urine cx, CXR, escalate abx to vancomycin, zosyn.     Pancytopenia    Prominent periaortic and bilateral iliac and inguinal lymph nodes visualized.  Findings are concerning for lymphoma per CT ab/pel 9/24  Hgb 6.1, platelets 60, WBC 3.85  Anemia panel with low reticulocytes   B12, folate WNL   Stool negative for OCB 9/24  Transfuse 2 units PRBC 10/6, two MD signature needed for consent due to patient with AMS and no family contact available.   Consult Hemoc     10/8 Hgb 6.9 - 1 unit PRBC ordered   10/9 platelets 39. Will transfuse if < 20 or obvious signs of bleeding       VTE Risk Mitigation (From admission, onward)           Ordered     IP VTE HIGH RISK PATIENT  Once         10/05/22 1609     Place sequential compression device  Until discontinued         10/05/22 1609                    Discharge Planning   ANUSHA:      Code Status: Full Code   Is the patient medically ready for discharge?:     Reason for patient still in hospital (select all that apply): Patient trending condition  Discharge Plan A: Skilled Nursing Facility   Discharge Delays: None known at this  time              Sheree Palma NP  Department of [facility] Medicine   [place] [facility]

## 2022-10-09 NOTE — PLAN OF CARE
Plan of care reviewed with pt. Pt shows no acute distress. NSR on monitor. Bed alarm on for pt safety.  Mepilex applied to sacral

## 2022-10-09 NOTE — PT/OT/SLP EVAL
"Physical Therapy Evaluation and Treatment    Patient Name:  Author Rafael Lawson   MRN:  [id number]    Recommendations:     Discharge Recommendations:   (post acute placement)   Discharge Equipment Recommendations:  (TBD)   Barriers to discharge: Inaccessible home, Decreased caregiver support, and increased assist needed    Assessment:     Author Rafael Lawson is a 71 y.o. male admitted with a medical diagnosis of Syncope and collapse.  He presents with the following impairments/functional limitations:  weakness, gait instability, impaired balance, impaired endurance, impaired self care skills, impaired sensation, impaired functional mobility, impaired cognition, decreased safety awareness, decreased lower extremity function, decreased upper extremity function, decreased ROM, decreased coordination, impaired coordination, impaired fine motor, impaired skin, edema, pain, impaired joint extensibility, impaired muscle length .Bed level eval performed. Pt presenting with mumbled, dysarthric speech. Pt easily agitated and decreased activity tolerance noted. Pt yells out in pain with movement of RLE. Pt reporting pain "everywhere." Pt follows motor commands with increased time and effort. Limited active movement noted on RLE and uanble to perform PROM 2/2 pain. Unable to safely attempt supine<>sit t/f this date due to above reasons. Recommending post acute placement. Will progress pt as able.     Rehab Prognosis: Fair; patient would benefit from acute skilled PT services to address these deficits and reach maximum level of function.    Recent Surgery: * No surgery found *      Plan:     During this hospitalization, patient to be seen 3 x/week to address the identified rehab impairments via therapeutic activities, therapeutic exercises, wheelchair management/training, neuromuscular re-education, gait training and progress toward the following goals:    Plan of Care Expires:  11/09/22    Subjective     Chief Complaint: RLE pain " "with movement  Patient/Family Comments/goals: none stated   Pain/Comfort:  Pain Rating 1:  (yelling out in pain, not rated)  Location - Side 1: Right  Location - Orientation 1: lower  Location 1: leg  Pain Addressed 1: Reposition, Distraction, Cessation of Activity, Nurse notified  Pain Rating Post-Intervention 1:  (not rated)    Patients cultural, spiritual, Congregation conflicts given the current situation: no    Living Environment:  Pt is difficult to understand and unreliable hx; pt's nephew in room during time of evaluation and nephew reports pt and his son had a falling out and that he personally has not seen pt in 5 years and is not aware of his PLOF    Per chart:  "Pt is reported to be residing at [facility]. Pt has been residence there since August, 2022. Pt is able to drive and owns a van. 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 Pt is independent of ADL's and no DME in use currently. Pt was reported to be walking just a week ago. Pt is reported to be supported by hotel staff rona and friend Lani. Pt has an adult son and daughter in [place]."  Equipment used at home:  (none per chart review, pt did not state).  Upon discharge, patient will have assistance from unknown.    Objective:     Communicated with nsg prior to session.  Patient found HOB elevated with bed alarm, peripheral IV, weaver catheter  upon PT entry to room.    General Precautions: Standard, aspiration, fall, vision impaired   Orthopedic Precautions:N/A   Braces: N/A      Exams:  Cognitive Exam:  Patient is oriented to Person and Place; mumbled, dysarthric speech. Pt easily agitated  Sensation: pt reports intact sensation to B LE upon LT screen  Skin Integrity/Edema:      -       skin breakdown noted to B heels and per chart along spine, nsg aware  Limited active movement noted RLE and unable to perform PROM 2/2 pt yelling out with minimal movement; PROM LLE WFL but unable to formally assess MMT due to pt's decreased command following and agitation; " "limited ankle mobility noted B, pt yelling out in pain during attempts to DF ankle pt lacking ~5-10 degrees from neutral B    Functional Mobility:  Bed Mobility:     Scooting: total assistance and of 2 persons, bed in trendelenburg     Therapeutic Activities and Exercises:   Pt and pt's nephew educated on role of PT/POC and recs for continued therapy post acute  Bed level eval performed.   Pt leaning heavily to L side of bed with L arm against rail upon arrival.  Pt had only sheet/blankets partially covering him with no gown donned upon arrival. Fresh gown donned and pt able to raise B arms to assist with dressing.  Pt presenting with mumbled, dysarthric speech.   Pt easily agitated and decreased activity tolerance noted.   Pt yells out in pain with movement of RLE. Pt reporting pain "everywhere."   Pt follows 75% of motor commands with increased time and effort.  Limited active movement noted on RLE and uanble to perform PROM 2/2 pain.   Unable to safely attempt supine<>sit t/f this date due to above reasons.  Nsg assisted with scooting pt supine toward HOB via draw sheet and bed in trend; wedge placed on pt's L side to assist with offloading pressure and to prevent L lateral leaning.   Pt yelling out in pain with bed mobility. Pt requiring totalA for R rolling.   Pressure relief boots in place    AM-PAC 6 CLICK MOBILITY  Total Score:7     Patient left HOB elevated with all lines intact, call button in reach, bed alarm on, nsg notified, and nephew present.    GOALS:   Multidisciplinary Problems       Physical Therapy Goals          Problem: Physical Therapy    Goal Priority Disciplines Outcome Goal Variances Interventions   Physical Therapy Goal     PT, PT/OT Ongoing, Progressing     Description: Goals to be met by: 22     Patient will increase functional independence with mobility by performin. Supine to sit with Moderate Assistance  2. Sit to supine with Moderate Assistance  3. Sit to stand transfer " with Moderate Assistance  4. Bed to chair transfer with Moderate Assistance using least restrictive assistive device  5. Gait  x 25 feet with Moderate Assistance using least restrictive assistive device.                          History:     No past medical history on file.    No past surgical history on file.    Time Tracking:     PT Received On: 10/09/22  PT Start Time: 1425     PT Stop Time: 1458  PT Total Time (min): 33 min     Billable Minutes: Evaluation 10 and Therapeutic Activity 23      10/09/2022

## 2022-10-09 NOTE — PLAN OF CARE
Problem: Adult Inpatient Plan of Care  Goal: Plan of Care Review  Outcome: Ongoing, Progressing  Goal: Absence of Hospital-Acquired Illness or Injury  Outcome: Ongoing, Progressing  Goal: Optimal Comfort and Wellbeing  Outcome: Ongoing, Progressing  Goal: Readiness for Transition of Care  Outcome: Ongoing, Progressing     Problem: Fluid and Electrolyte Imbalance (Acute Kidney Injury/Impairment)  Goal: Fluid and Electrolyte Balance  Outcome: Ongoing, Progressing     Problem: Oral Intake Inadequate (Acute Kidney Injury/Impairment)  Goal: Optimal Nutrition Intake  Outcome: Ongoing, Progressing     Problem: Syncope  Goal: Absence of Syncopal Symptoms  Outcome: Ongoing, Progressing     Problem: Fall Injury Risk  Goal: Absence of Fall and Fall-Related Injury  Outcome: Ongoing, Progressing     Problem: Skin Injury Risk Increased  Goal: Skin Health and Integrity  Outcome: Ongoing, Progressing     Problem: Coping Ineffective  Goal: Effective Coping  Outcome: Ongoing, Progressing     Problem: Confusion Acute  Goal: Optimal Cognitive Function  Outcome: Ongoing, Progressing

## 2022-10-09 NOTE — NURSING
ROBERTO Vela notified critical Platelets of 39 this morning, yesterday was 50. No new order received. Cont to monitor

## 2022-10-09 NOTE — ASSESSMENT & PLAN NOTE
Appears sig dehydrated but cannot tell us why he hasn't had a cup of water, he says in several days. Not sure of the validity of this. I suspect Etoh abuse.  MRI w/o acute infarct, but shows Left frontoparietal encephalomalacia. . Neuro checks and neuro c/s  UDS (negative) and etoh level (negative), PETH (negative)  PT/OT/ST/SW  -neurology on board   - EEG to assess for nonepileptic seizure activity:  moderate/severe degree of diffuse slowing and generalized triphasic waves.  These findings are consistent with a diffuse disturbance of cerebral function or encephalopathy secondary to metabolic, toxic, infectious, inflammatory, or other systemic processes. No potentially epileptogenic discharges or seizure activity are present during the recording.   -ammonia (WNL) RPR (nonreactive), HIV (nonreactive), ABG (mildly hypoxic, added supplemental O2)   -CIWA q 4 hours with PRN ativan if he is withdrawing from ETOH - stopped   -UA with rare bacteria- start IV rocephin    -MRA head: Motion degraded examination, without compelling evidence of high-grade stenosis or large vessel occlusion.   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 discussed with  Vascular neurology who does not recommend  Intervention due to no acute stroke and would medically manage. Cannot anticoagulate at this time due to pancytopenia.   -will discuss LP with neurology   -mental status mildly improved 10/8, slightly worse 10/9  -febrile overnight, redraw blood cx, urine cx, CXR, escalate abx to vancomycin, zosyn.

## 2022-10-09 NOTE — PROGRESS NOTES
10/08/22 1920   Nurse Notification   Charge Nurse Notified? Yes  (AI alert)   Name of Charge Nurse DIOGENES Connors   Bedside Nurse Notified? Yes   Name of Bedside Nurse DIOGENES White   Nurse Notfication Method Secure Chat  (AI alert)   Provider Notification   Provider Notified? Yes   Name of Provider ROBERTO Morin   Provider Notification Method Secure Chat   Provider Notified Of AI Deterioration Alert

## 2022-10-09 NOTE — PLAN OF CARE
"  Problem: Physical Therapy  Goal: Physical Therapy Goal  Description: Goals to be met by: 22     Patient will increase functional independence with mobility by performin. Supine to sit with Moderate Assistance  2. Sit to supine with Moderate Assistance  3. Sit to stand transfer with Moderate Assistance  4. Bed to chair transfer with Moderate Assistance using least restrictive assistive device  5. Gait  x 25 feet with Moderate Assistance using least restrictive assistive device.     Outcome: Ongoing, Progressing     Bed level eval performed. Pt presenting with mumbled, dysarthric speech. Pt easily agitated and decreased activity tolerance noted. Pt yells out in pain with movement of RLE. Pt reporting pain "everywhere." Pt follows motor commands with increased time and effort. Limited active movement noted on RLE and uanble to perform PROM 2/2 pain. Unable to safely attempt supine<>sit t/f this date due to above reasons. Recommending post acute placement. Will progress pt as able.   "

## 2022-10-09 NOTE — ASSESSMENT & PLAN NOTE
?beer potomania. Appears chronic do not suspect it's c/t his AMS  Mey and Osm  Serial Na while giving isotonic saline in case this is siadh which I highly doubt    Na now 141     10/9 Na 151- start d10 gtt at 50 ml/hr, recheck na in 4 hours

## 2022-10-09 NOTE — AI DETERIORATION ALERT
AI received due to tachycardia.   SBP stable   Hgb low at 6.9 and likely due to hypovolemia  Type and cross noted; transfused 1 unit PRBC today  EKG stat ordered for completeness  IVF initiated   Hem/ONC and hospitalist note reviewed    Vitals:    10/08/22 1724 10/08/22 1835 10/08/22 1844 10/08/22 1912   BP: 130/61 (!) 185/123 (!) 152/63    BP Location:  Left arm     Patient Position:  Lying     Pulse: 105 (!) 111 (!) 111 (!) 112   Resp: 19 19  (!) 24   Temp: 99.2 °F (37.3 °C) 98.8 °F (37.1 °C)     TempSrc: Oral Axillary     SpO2: 95% 95%  98%   Weight:       Height:         Recent Labs   Lab 10/06/22  0538 10/07/22  0713 10/08/22  0635   WBC 3.85* 5.19 4.72   HGB 6.1* 7.2* 6.9*   HCT 18.0* 20.4* 20.2*   PLT 60* 72* 50*   MONO 3.0* 3.0* 6.1  0.3     Recent Labs   Lab 10/06/22  0538 10/06/22  1216 10/07/22  0713 10/07/22  1312 10/08/22  0026 10/08/22  0635 10/08/22  1147     140   < > 140  140   < > 146* 149*  149* 149*   K 3.9  --  3.7  --   --  3.6  --      --  110  --   --  118*  --    CO2 20*  --  22*  --   --  22*  --    BUN 58*  --  49*  --   --  35*  --    CREATININE 1.8*  --  1.4  --   --  1.2  --    CALCIUM 8.9  --  8.7  --   --  8.7  --    PROT 6.3  --  6.6  --   --  6.6  --    BILITOT 2.4*  --  3.5*  --   --  4.2*  --    ALKPHOS 118  --  123  --   --  106  --    ALT 33  --  30  --   --  30  --    AST 37  --  34  --   --  28  --     < > = values in this interval not displayed.

## 2022-10-09 NOTE — PROGRESS NOTES
Pharmacokinetic Initial Assessment: IV Vancomycin    Assessment/Plan:    Initiate intravenous vancomycin with loading dose of 2250 mg once followed by a maintenance dose of vancomycin 1000 mg IV every 12 hours  Desired empiric serum trough concentration is 10 to 20 mcg/mL  Draw vancomycin trough level 60 min prior to fourth dose on 10-10-22 at approximately 2100  Pharmacy will continue to follow and monitor vancomycin.      Please contact pharmacy at extension [phone] with any questions regarding this assessment.     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 male initiated on antimicrobial therapy with IV Vancomycin for treatment of suspected  FEVER    Drug Allergies:   Review of patient's allergies indicates:  No Known Allergies    Actual Body Weight:   101.6 kg    Renal Function:   Estimated Creatinine Clearance: 68.7 mL/min (based on SCr of 1.2 mg/dL).,     Dialysis Method (if applicable):  N/A    CBC (last 72 hours):  Recent Labs   Lab Result Units 10/07/22  0713 10/08/22  0635 10/09/22  0505   WBC K/uL 5.19 4.72 5.17   Hemoglobin g/dL 7.2* 6.9* 7.4*   Hematocrit % 20.4* 20.2* 21.8*   Platelets K/uL 72* 50* 39*   Gran % % 87.0* 72.5 85.7*   Lymph % % 8.0* 19.5 7.7*   Mono % % 3.0* 6.1 4.1   Eosinophil % % 0.0 0.2 0.8   Basophil % % 0.0 0.0 0.2   Differential Method  Manual Automated Automated       Metabolic Panel (last 72 hours):  Recent Labs   Lab Result Units 10/06/22  1216 10/06/22  1251 10/06/22  1928 10/06/22  2350 10/07/22  0713 10/07/22  1312 10/07/22  1849 10/08/22  0026 10/08/22  0635 10/08/22  1147 10/08/22  2353 10/09/22  0505   Sodium mmol/L 141  --  138 139 140  140 141 145 146* 149*  149* 149* 149* 152*   Potassium mmol/L  --   --   --   --  3.7  --   --   --  3.6  --   --   --    Chloride mmol/L  --   --   --   --  110  --   --   --  118*  --   --   --    CO2 mmol/L  --   --   --   --  22*  --   --   --  22*  --   --   --    Glucose  mg/dL  --   --   --   --  108  --   --   --  120*  --   --   --    Glucose, UA   --  Negative  --   --   --   --   --   --   --   --   --   --    BUN mg/dL  --   --   --   --  49*  --   --   --  35*  --   --   --    Creatinine mg/dL  --   --   --   --  1.4  --   --   --  1.2  --   --   --    Creatinine, Urine mg/dL 76.0  76.0  --   --   --   --   --   --   --   --   --   --   --    Albumin g/dL  --   --   --   --  2.2*  --   --   --  2.2*  --   --   --    Total Bilirubin mg/dL  --   --   --   --  3.5*  --   --   --  4.2*  --   --   --    Alkaline Phosphatase U/L  --   --   --   --  123  --   --   --  106  --   --   --    AST U/L  --   --   --   --  34  --   --   --  28  --   --   --    ALT U/L  --   --   --   --  30  --   --   --  30  --   --   --    Magnesium mg/dL  --   --   --   --  2.3  --   --   --  2.4  --   --   --    Phosphorus mg/dL  --   --   --   --  3.7  --   --   --  3.7  --   --   --        Drug levels (last 3 results):  No results for input(s): VANCOMYCINRA, VANCORANDOM, VANCOMYCINPE, VANCOPEAK, VANCOMYCINTR, VANCOTROUGH in the last 72 hours.    Microbiologic Results:  Microbiology Results (last 7 days)       Procedure Component Value Units Date/Time    Blood culture [[id number]]     Order Status: Sent Specimen: Blood     Blood culture [[id number]]     Order Status: Sent Specimen: Blood     Blood culture [[id number]] Collected: 10/07/22 1849    Order Status: Completed Specimen: Blood Updated: 10/09/22 0613     Blood Culture, Routine No Growth to date      No Growth to date    Blood culture [[id number]] Collected: 10/07/22 1849    Order Status: Completed Specimen: Blood Updated: 10/09/22 0613     Blood Culture, Routine No Growth to date      No Growth to date    Urine Culture High Risk [[id number]]     Order Status: No result Specimen: Urine     Influenza A & B by Molecular [[id number]] Collected: 10/05/22 1313    Order Status: Completed Specimen: Nasopharyngeal Swab Updated: 10/05/22 1412     Influenza A,  Molecular Negative     Influenza B, Molecular Negative     Flu A & B Source Nasal swab

## 2022-10-09 NOTE — AI DETERIORATION ALERT
Artificial Intelligence Notification  Randall      Admit Date: 10/5/2022  LOS: 2  Code Status: Full Code   Date of Consult: 10/08/2022  : 1950  Age: 71 y.o.  Weight:   Wt Readings from Last 1 Encounters:   10/07/22 101.6 kg (223 lb 15.8 oz)     Sex: male  [facility]:   MRN: [id number]  Attending Physician: Lukas Linares MD  Primary Service: Networked reference to record PCT   Time AI Alert Received: ***  Time at Bedside: ***           ***      Vital Signs (Most Recent):  Temp: 98.8 °F (37.1 °C) (10/08/22 1835)  Pulse: (!) 112 (10/08/22 1912)  Resp: (!) 24 (10/08/22 1912)  BP: (!) 152/63 (10/08/22 1844)  SpO2: 98 % (10/08/22 1912)   Vital Signs (24h Range):  Temp:  [96.7 °F (35.9 °C)-99.2 °F (37.3 °C)] 98.8 °F (37.1 °C)  Pulse:  [] 112  Resp:  [18-26] 24  SpO2:  [93 %-99 %] 98 %  BP: ()/() 152/63         This encounter was triggered by an Artificial Intelligence Notification.     Artificial Intelligence alert discussed with Primary team:  Name ***      Evaluation: ***    Disposition: ***

## 2022-10-09 NOTE — PROGRESS NOTES
"NEUROLOGY FLOOR CONSULT    Reason for consult:  AMS        Other sources of information : patient, past medical records, and ER records    CC:  "Hip pain"    HPI:   Author Rafael Lawson is a 71 y.o. year old male, with pancytopenia, diffuse lymphadenopathy of unknown significance and   Anemia is brought into the ED by EMS for the evaluation of altered mental status.  Per nursing chart, patient was found in the hotel bathroom on the floor, covered in urine and feces.  Patient does not remember anything leading to this event.  Patient was confused about his situation but he is alert and oriented to self and place. Patient has widespread lymphadenopathy worrisome for lymphoma. Patient's recent echo showed heart failure with preserved ejection fraction.  CBC showed hemoglobin of 6.1. Neurology was consulted for the evaluation of AMS. Patient was found to have Hypovolemia/hyponatremia with concern for beer potomania. MRI brain showed L frontoparietal encephalomalacia. On examination, patient seems to have returned to his baseline with mild drowsiness.     SUBJECTIVE   Patient was seen and examined at the bedside this morning in the presence of Nurse, Meenakshi Mason. Patient was alert and oriented to self(said his name), , place, and followed commands in all 4 extremities with out any lag. 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 Reported pain in his back and lower extremites when he was asked to lift his legs. He repeatedly asked for water too while he was being fed 1:1.     Histories:     Allergies:  Patient has no known allergies.    Current Medications:    Current Facility-Administered Medications   Medication Dose Route Frequency Provider Last Rate Last Admin    0.9%  NaCl infusion (for blood administration)   Intravenous Q24H PRN Sheree Palma NP        0.9%  NaCl infusion (for blood administration)   Intravenous Q24H PRN Sheree Palma NP        acetaminophen tablet 650 mg  650 mg Oral Q8H " PRN Kevin Cervantes MD   650 mg at 10/08/22 2015    albuterol-ipratropium 2.5 mg-0.5 mg/3 mL nebulizer solution 3 mL  3 mL Nebulization Q4H PRN Kevin Cervantes MD        bisacodyL suppository 10 mg  10 mg Rectal Daily PRN Kevin Cervantes MD        dextrose 10% bolus 125 mL  12.5 g Intravenous PRN Kevin Cervantes MD        dextrose 10% bolus 250 mL  25 g Intravenous PRN Kevin Cervantes MD        dextrose 5 % infusion   Intravenous Continuous Sheree Palma NP        famotidine tablet 20 mg  20 mg Oral BID Sheree Palma NP        folic acid tablet 1 mg  1 mg Oral Daily Kevin Cervantes MD   1 mg at 10/09/22 0911    glucagon (human recombinant) injection 1 mg  1 mg Intramuscular PRN Kevin Cervantes MD        glucose chewable tablet 16 g  16 g Oral PRN Kevin Cervantes MD        glucose chewable tablet 24 g  24 g Oral PRN Kevin Cervantes MD        melatonin tablet 6 mg  6 mg Oral Nightly PRN Kevin Cervantes MD   6 mg at 10/05/22 2107    multivitamin tablet  1 tablet Oral Daily Kevin Cervantes MD   1 tablet at 10/09/22 0911    naloxone 0.4 mg/mL injection 0.02 mg  0.02 mg Intravenous PRN Kevin Cervantes MD        ondansetron disintegrating tablet 8 mg  8 mg Oral Q8H PRN Kevin Cervantes MD   8 mg at 10/06/22 1506    piperacillin-tazobactam 4.5 g in dextrose 5 % 100 mL IVPB (ready to mix system)  4.5 g Intravenous Q8H Sheree Palma NP   Stopped at 10/09/22 1318    polyethylene glycol packet 17 g  17 g Oral TID PRN Kevin Cervantes MD        predniSONE tablet 40 mg  40 mg Oral Daily Sheree Palma NP        prochlorperazine injection Soln 5 mg  5 mg Intravenous Q6H PRN Kevin Cervantes MD        simethicone chewable tablet 80 mg  1 tablet Oral QID PRN Kevin Cervantes MD        sodium chloride 0.9% flush 2 mL  2 mL Intravenous Q12H PRN Kevin Cervantes MD        thiamine tablet 100 mg   100 mg Oral Daily Kevin Cervantes MD   100 mg at 10/09/22 0912    vancomycin (VANCOCIN) 2,250 mg in dextrose 5 % 500 mL IVPB  2,250 mg Intravenous Once Dayne Roman  mL/hr at 10/09/22 1424 2,250 mg at 10/09/22 1424    vancomycin - pharmacy to dose   Intravenous pharmacy to manage frequency Sheree Palma NP        vancomycin in dextrose 5 % 1 gram/250 mL IVPB 1,000 mg  1,000 mg Intravenous Q12H Dayne Roman MD           Past Medical/Surgical/Family History:  Medical: No past medical history on file.   Surgeries: No past surgical history on file.   Family: No family history on file.,         Current Evaluation:     Vital Signs:   Vitals:    10/09/22 1212   BP:    Pulse: 97   Resp:    Temp:         ORIENTATION: Oriented to self, and place. Yesterday, he was oriented to president     MEMORY: Poor recent and remote memory    LANGUAGE: 2=Severe aphasia; all communication is through fragmentary expression; great need for inference, questioning, and guessing by the listener. Range of information that can be exchanged is limited; listener carries burden of communication. Examiner cannot identify materials provided from patient response.     CRANIAL NERVES:  Blind from the R eye   No facial asymmetry   Remaining CN grossly normal     MOTOR:  Pronator drift: absent  Strength 5/5 in b/l Ues   Antigravity 3+/5 in b/l LE - Limited due to back pain and knee pain?     Tone: normal      SENSORY:  normal to light touch .    CEREBELLAR/GAIT:  Finger to nose:Unable to examined   Gait: Deferred for the patients safety     LABORATORY STUDIES:  Recent Results (from the past 24 hour(s))   Sodium    Collection Time: 10/08/22 11:53 PM   Result Value Ref Range    Sodium 149 (H) 136 - 145 mmol/L   Sodium    Collection Time: 10/09/22  5:05 AM   Result Value Ref Range    Sodium 152 (H) 136 - 145 mmol/L   CBC auto differential    Collection Time: 10/09/22  5:05 AM   Result Value Ref Range    WBC 5.17 3.90  - 12.70 K/uL    RBC 2.36 (L) 4.60 - 6.20 M/uL    Hemoglobin 7.4 (L) 14.0 - 18.0 g/dL    Hematocrit 21.8 (L) 40.0 - 54.0 %    MCV 92 82 - 98 fL    MCH 31.4 (H) 27.0 - 31.0 pg    MCHC 33.9 32.0 - 36.0 g/dL    RDW 16.8 (H) 11.5 - 14.5 %    Platelets 39 (LL) 150 - 450 K/uL    MPV 12.7 9.2 - 12.9 fL    Immature Granulocytes 1.5 (H) 0.0 - 0.5 %    Gran # (ANC) 4.4 1.8 - 7.7 K/uL    Immature Grans (Abs) 0.08 (H) 0.00 - 0.04 K/uL    Lymph # 0.4 (L) 1.0 - 4.8 K/uL    Mono # 0.2 (L) 0.3 - 1.0 K/uL    Eos # 0.0 0.0 - 0.5 K/uL    Baso # 0.01 0.00 - 0.20 K/uL    nRBC 0 0 /100 WBC    Gran % 85.7 (H) 38.0 - 73.0 %    Lymph % 7.7 (L) 18.0 - 48.0 %    Mono % 4.1 4.0 - 15.0 %    Eosinophil % 0.8 0.0 - 8.0 %    Basophil % 0.2 0.0 - 1.9 %    Platelet Estimate Decreased (A)     Aniso Slight     Hypo Occasional     Differential Method Automated    Sodium    Collection Time: 10/09/22 12:48 PM   Result Value Ref Range    Sodium 153 (H) 136 - 145 mmol/L   Comprehensive metabolic panel    Collection Time: 10/09/22 12:49 PM   Result Value Ref Range    Sodium 153 (H) 136 - 145 mmol/L    Potassium 3.6 3.5 - 5.1 mmol/L    Chloride 122 (H) 95 - 110 mmol/L    CO2 23 23 - 29 mmol/L    Glucose 119 (H) 70 - 110 mg/dL    BUN 27 (H) 8 - 23 mg/dL    Creatinine 1.1 0.5 - 1.4 mg/dL    Calcium 9.0 8.7 - 10.5 mg/dL    Total Protein 7.0 6.0 - 8.4 g/dL    Albumin 2.2 (L) 3.5 - 5.2 g/dL    Total Bilirubin 7.2 (H) 0.1 - 1.0 mg/dL    Alkaline Phosphatase 108 55 - 135 U/L    AST 27 10 - 40 U/L    ALT 32 10 - 44 U/L    Anion Gap 8 8 - 16 mmol/L    eGFR >60 >60 mL/min/1.73 m^2   Urinalysis, Reflex to Urine Culture Urine, Clean Catch    Collection Time: 10/09/22  2:31 PM    Specimen: Urine   Result Value Ref Range    Specimen UA Urine, Catheterized     Color, UA Yellow Yellow, Straw, Dahlia    Appearance, UA Hazy (A) Clear    pH, UA 6.0 5.0 - 8.0    Specific Gravity, UA 1.025 1.005 - 1.030    Protein, UA 1+ (A) Negative    Glucose, UA Negative Negative    Ketones, UA  Negative Negative    Bilirubin (UA) 1+ (A) Negative    Occult Blood UA 1+ (A) Negative    Nitrite, UA Negative Negative    Urobilinogen, UA 4.0-6.0 (A) <2.0 EU/dL    Leukocytes, UA Negative Negative   Urinalysis Microscopic    Collection Time: 10/09/22  2:31 PM   Result Value Ref Range    RBC, UA 0 0 - 4 /hpf    WBC, UA 1 0 - 5 /hpf    Bacteria None None-Occ /hpf    Hyaline Casts, UA 0 0-1/lpf /lpf    Microscopic Comment SEE COMMENT          RADIOLOGY STUDIES:  I have personally reviewed the images performed.     HEAD CT:   1. No acute intracranial findings.  2. Left frontoparietal encephalomalacia gliosis for which clinical correlation recommended.    BRAIN MRI     There is no evidence of restricted diffusion to suggest an acute infarction.     Ventricles are normal in size for age without evidence of hydrocephalus.  There is left frontoparietal encephalomalacia, likely related to a remote infarction.  There are calcifications along the posterior falx.  There are T2/FLAIR hyperintensities in the supratentorial white matter, compatible with mild chronic microvascular ischemic changes.  No mass, hemorrhage, or recent or remote major vascular distribution infarct.  No extra-axial blood or fluid collections. Normal vascular flow voids.     Bone marrow signal intensity is normal. Paranasal sinuses and mastoid air cells are clear.      Assessment:  P    Rafael Lawson is a 71 y.o. year old male, with pancytopenia, diffuse lymphadenopathy of unknown significance and anemia is here for the evaluation of syncope and acute encephalopathy, which was thought to be 2/2 dehydration/low intravascular volume with Hg of 6ish vs cardiogenic syncope vs seizures. Patient does have L frontoparietal encephalomalacia and that puts him at risk of seizures; however, we suspect that dehydration, anemia, UTI and  metabolic derangements could be main contributors here. For better assessment CTA head and neck were obtained, official read  suggests 60% L carotid bifurcation stenosis, and R vert narrowed and irregular at the C1 and is critically  narrowed/occluded as it enters foramen magnum. Radiologist suggests clinical suspicion of underlying dissection. Given the recent fall? Vascular neurology consult is recommended. EEG brain showed mod-severe diffuse slowing and gen triphasic waves suggestive of toxic/infectious/metabolic derangements. No seizures or epileptogenic focus seen. Patient is oriented to self, place, and follows commands in all 4 extremities with out any lag.     Treatment Plan  -Consult vascular neurology for the evaluation of possible R vert dissection and management- would appreciate their recs; Apparently, primary team reached out to vascular and they recommended medical management and initiation of antiplatelet therapy; however, primary team is concerned about the platelets being low and holding it for now.   -Correct underlying infectious and metabolic derangements   -Normonatremia, normoxia and normotension   -Consider obtaining an MRI brain with and with out contrast given the concern for possible malignancy in the setting of diffuse LAD  -STRICT delirium precautions   -Hydration   -Fall precautions   -Goals of care discussion   -Heme/onc on board- appreciate recs given the low platelets with 3 pRBCs transfusion and possible initiation of antiplatelet therapy    -Palliative on board    Differential diagnosis was explained to the patient. All questions were answered. Patient understood and agreed to adhere to plan.       Case discussed with Dr. Jennie Quintanilla- PGY IV  [employer] [facility]

## 2022-10-09 NOTE — ASSESSMENT & PLAN NOTE
Prominent periaortic and bilateral iliac and inguinal lymph nodes visualized.  Findings are concerning for lymphoma per CT ab/pel 9/24  Hgb 6.1, platelets 60, WBC 3.85  Anemia panel with low reticulocytes   B12, folate WNL   Stool negative for OCB 9/24  Transfuse 2 units PRBC 10/6, two MD signature needed for consent due to patient with AMS and no family contact available.   Consult Hemoc     10/8 Hgb 6.9 - 1 unit PRBC ordered   10/9 platelets 39. Will transfuse if < 20 or obvious signs of bleeding

## 2022-10-09 NOTE — ASSESSMENT & PLAN NOTE
Pt appeared disheveled at POC he's reported to be living in a hotel room and likely is abusing etoh.   May benefit from placement. SW for DC planning    -patient's nephew would like him to be in a nursing home in [place]. He is coming Monday along with patient's ex wife to begin planning.   Patient may also be a hospice candidate.

## 2022-10-09 NOTE — ASSESSMENT & PLAN NOTE
He denies eating and drinking well and has not been taking care of himself. Not sure of the validity of this. Family reports ETOH abuse 6 months ago.  MRI w/o acute infarct, but shows Left frontoparietal encephalomalacia. . Neuro checks and neuro c/s  UDS (negative) and etoh level (negative), PETH (negative)  PT/OT/ST/SW  -neurology on board   - EEG to assess for nonepileptic seizure activity:  moderate/severe degree of diffuse slowing and generalized triphasic waves.  These findings are consistent with a diffuse disturbance of cerebral function or encephalopathy secondary to metabolic, toxic, infectious, inflammatory, or other systemic processes. No potentially epileptogenic discharges or seizure activity are present during the recording.   -ammonia (WNL) RPR (nonreactive), HIV (nonreactive), ABG (mildly hypoxic, added supplemental O2)   -CIWA q 4 hours with PRN ativan if he is withdrawing from ETOH - stopped   -UA with rare bacteria- started IV rocephin, abx escalated with fevers, now no fevers since 10/8    -MRA head: Motion degraded examination, without compelling evidence of high-grade stenosis or large vessel occlusion.   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 discussed with  Vascular neurology who does not recommend  Intervention due to no acute stroke and would medically manage. Cannot give antiplatelets at this time due to pancytopenia.   -will discuss LP with neurology - neuro recs: no LP at this time, official consult to vascular neurology, MRI brain w w/o to assess for meningeal enhancements   -mental status mildly improved 10/8, slightly worse 10/9, AAOx3 10/10  -febrile overnight 10/8, redraw blood cx (pending), urine cx (pending),   CXR:Heart size is similar to previous exam, not enlarged.  There is calcification along the wall of the aorta.  No large volume of pleural fluid is present on this single view.  There are degenerative changes in the spine.  Lung  fields are somewhat underinflated, with no focal consolidation present.    -escalated abx to vancomycin, zosyn.

## 2022-10-09 NOTE — NURSING
Received a call from Julissa who claimed to be very close friend asking for pt condition. RN In a middle of report, told her to call back. Read previous note that Julissa claimed to be pt daughter????

## 2022-10-10 PROBLEM — I65.01 OCCLUSION AND STENOSIS OF RIGHT VERTEBRAL ARTERY: Status: ACTIVE | Noted: 2022-10-10

## 2022-10-10 LAB
ALBUMIN SERPL BCP-MCNC: 2 G/DL (ref 3.5–5.2)
ALP SERPL-CCNC: 101 U/L (ref 55–135)
ALT SERPL W/O P-5'-P-CCNC: 34 U/L (ref 10–44)
ANION GAP SERPL CALC-SCNC: 8 MMOL/L (ref 8–16)
ANISOCYTOSIS BLD QL SMEAR: SLIGHT
AST SERPL-CCNC: 31 U/L (ref 10–40)
BASOPHILS # BLD AUTO: 0.01 K/UL (ref 0–0.2)
BASOPHILS NFR BLD: 0 % (ref 0–1.9)
BASOPHILS NFR BLD: 0.2 % (ref 0–1.9)
BILIRUB SERPL-MCNC: 5.6 MG/DL (ref 0.1–1)
BLD PROD TYP BPU: NORMAL
BLD PROD TYP BPU: NORMAL
BLOOD UNIT EXPIRATION DATE: NORMAL
BLOOD UNIT EXPIRATION DATE: NORMAL
BLOOD UNIT TYPE CODE: 6200
BLOOD UNIT TYPE CODE: 6200
BLOOD UNIT TYPE: NORMAL
BLOOD UNIT TYPE: NORMAL
BUN SERPL-MCNC: 28 MG/DL (ref 8–23)
CALCIUM SERPL-MCNC: 8.7 MG/DL (ref 8.7–10.5)
CHLORIDE SERPL-SCNC: 119 MMOL/L (ref 95–110)
CO2 SERPL-SCNC: 22 MMOL/L (ref 23–29)
CODING SYSTEM: NORMAL
CODING SYSTEM: NORMAL
CREAT SERPL-MCNC: 1.1 MG/DL (ref 0.5–1.4)
DACRYOCYTES BLD QL SMEAR: ABNORMAL
DACRYOCYTES BLD QL SMEAR: ABNORMAL
DIFFERENTIAL METHOD: ABNORMAL
DIFFERENTIAL METHOD: ABNORMAL
DISPENSE STATUS: NORMAL
DISPENSE STATUS: NORMAL
EOSINOPHIL # BLD AUTO: 0 K/UL (ref 0–0.5)
EOSINOPHIL NFR BLD: 0 % (ref 0–8)
EOSINOPHIL NFR BLD: 0 % (ref 0–8)
ERYTHROCYTE [DISTWIDTH] IN BLOOD BY AUTOMATED COUNT: 16.7 % (ref 11.5–14.5)
ERYTHROCYTE [DISTWIDTH] IN BLOOD BY AUTOMATED COUNT: 16.8 % (ref 11.5–14.5)
EST. GFR  (NO RACE VARIABLE): >60 ML/MIN/1.73 M^2
GIANT PLATELETS BLD QL SMEAR: PRESENT
GIANT PLATELETS BLD QL SMEAR: PRESENT
GLUCOSE SERPL-MCNC: 152 MG/DL (ref 70–110)
HCT VFR BLD AUTO: 19.4 % (ref 40–54)
HCT VFR BLD AUTO: 20.7 % (ref 40–54)
HGB BLD-MCNC: 6.4 G/DL (ref 14–18)
HGB BLD-MCNC: 7.1 G/DL (ref 14–18)
HYPOCHROMIA BLD QL SMEAR: ABNORMAL
IMM GRANULOCYTES # BLD AUTO: 0.04 K/UL (ref 0–0.04)
IMM GRANULOCYTES # BLD AUTO: ABNORMAL K/UL (ref 0–0.04)
IMM GRANULOCYTES NFR BLD AUTO: 0.9 % (ref 0–0.5)
IMM GRANULOCYTES NFR BLD AUTO: ABNORMAL % (ref 0–0.5)
LYMPHOCYTES # BLD AUTO: 0.5 K/UL (ref 1–4.8)
LYMPHOCYTES NFR BLD: 10.7 % (ref 18–48)
LYMPHOCYTES NFR BLD: 11 % (ref 18–48)
MAGNESIUM SERPL-MCNC: 2.1 MG/DL (ref 1.6–2.6)
MCH RBC QN AUTO: 29.9 PG (ref 27–31)
MCH RBC QN AUTO: 30.5 PG (ref 27–31)
MCHC RBC AUTO-ENTMCNC: 33 G/DL (ref 32–36)
MCHC RBC AUTO-ENTMCNC: 34.3 G/DL (ref 32–36)
MCV RBC AUTO: 89 FL (ref 82–98)
MCV RBC AUTO: 91 FL (ref 82–98)
MONOCYTES # BLD AUTO: 0.2 K/UL (ref 0.3–1)
MONOCYTES NFR BLD: 3.9 % (ref 4–15)
MONOCYTES NFR BLD: 6 % (ref 4–15)
NEUTROPHILS # BLD AUTO: 3.9 K/UL (ref 1.8–7.7)
NEUTROPHILS NFR BLD: 83 % (ref 38–73)
NEUTROPHILS NFR BLD: 84.3 % (ref 38–73)
NRBC BLD-RTO: 0 /100 WBC
NRBC BLD-RTO: 0 /100 WBC
OVALOCYTES BLD QL SMEAR: ABNORMAL
OVALOCYTES BLD QL SMEAR: ABNORMAL
PHOSPHATE SERPL-MCNC: 3.3 MG/DL (ref 2.7–4.5)
PLATELET # BLD AUTO: 63 K/UL (ref 150–450)
PLATELET # BLD AUTO: 95 K/UL (ref 150–450)
PLATELET BLD QL SMEAR: ABNORMAL
PLATELET BLD QL SMEAR: ABNORMAL
PMV BLD AUTO: 11.8 FL (ref 9.2–12.9)
PMV BLD AUTO: 12.4 FL (ref 9.2–12.9)
POTASSIUM SERPL-SCNC: 3.6 MMOL/L (ref 3.5–5.1)
PROT SERPL-MCNC: 6.5 G/DL (ref 6–8.4)
RBC # BLD AUTO: 2.14 M/UL (ref 4.6–6.2)
RBC # BLD AUTO: 2.33 M/UL (ref 4.6–6.2)
SODIUM SERPL-SCNC: 137 MMOL/L (ref 136–145)
SODIUM SERPL-SCNC: 143 MMOL/L (ref 136–145)
SODIUM SERPL-SCNC: 149 MMOL/L (ref 136–145)
SODIUM SERPL-SCNC: 149 MMOL/L (ref 136–145)
SODIUM SERPL-SCNC: 151 MMOL/L (ref 136–145)
TRANS ERYTHROCYTES VOL PATIENT: NORMAL ML
TRANS ERYTHROCYTES VOL PATIENT: NORMAL ML
VANCOMYCIN TROUGH SERPL-MCNC: 13.9 UG/ML (ref 10–22)
WBC # BLD AUTO: 3.71 K/UL (ref 3.9–12.7)
WBC # BLD AUTO: 4.67 K/UL (ref 3.9–12.7)

## 2022-10-10 PROCEDURE — 36430 TRANSFUSION BLD/BLD COMPNT: CPT

## 2022-10-10 PROCEDURE — 30200315 PPD INTRADERMAL TEST REV CODE 302: Performed by: HOSPITALIST

## 2022-10-10 PROCEDURE — 25000003 PHARM REV CODE 250: Performed by: INTERNAL MEDICINE

## 2022-10-10 PROCEDURE — 80053 COMPREHEN METABOLIC PANEL: CPT | Performed by: NURSE PRACTITIONER

## 2022-10-10 PROCEDURE — 84295 ASSAY OF SERUM SODIUM: CPT | Performed by: INTERNAL MEDICINE

## 2022-10-10 PROCEDURE — 99233 SBSQ HOSP IP/OBS HIGH 50: CPT | Mod: ,,, | Performed by: STUDENT IN AN ORGANIZED HEALTH CARE EDUCATION/TRAINING PROGRAM

## 2022-10-10 PROCEDURE — 25500020 PHARM REV CODE 255: Performed by: HOSPITALIST

## 2022-10-10 PROCEDURE — 99497 ADVNCD CARE PLAN 30 MIN: CPT | Mod: 25,,, | Performed by: STUDENT IN AN ORGANIZED HEALTH CARE EDUCATION/TRAINING PROGRAM

## 2022-10-10 PROCEDURE — 92526 ORAL FUNCTION THERAPY: CPT

## 2022-10-10 PROCEDURE — 27201598 HC CASSETTE, BLOOD WARMER

## 2022-10-10 PROCEDURE — 85025 COMPLETE CBC W/AUTO DIFF WBC: CPT | Mod: 91 | Performed by: HOSPITALIST

## 2022-10-10 PROCEDURE — 86580 TB INTRADERMAL TEST: CPT | Performed by: HOSPITALIST

## 2022-10-10 PROCEDURE — 63600175 PHARM REV CODE 636 W HCPCS: Performed by: HOSPITALIST

## 2022-10-10 PROCEDURE — 99222 PR INITIAL HOSPITAL CARE,LEVL II: ICD-10-PCS | Mod: ,,, | Performed by: PSYCHIATRY & NEUROLOGY

## 2022-10-10 PROCEDURE — 99497 PR ADVNCD CARE PLAN 30 MIN: ICD-10-PCS | Mod: 25,,, | Performed by: STUDENT IN AN ORGANIZED HEALTH CARE EDUCATION/TRAINING PROGRAM

## 2022-10-10 PROCEDURE — 94761 N-INVAS EAR/PLS OXIMETRY MLT: CPT

## 2022-10-10 PROCEDURE — G0426 PR INPT TELEHEALTH CONSULT 50M: ICD-10-PCS | Mod: 95,,, | Performed by: NURSE PRACTITIONER

## 2022-10-10 PROCEDURE — 80202 ASSAY OF VANCOMYCIN: CPT | Performed by: HOSPITALIST

## 2022-10-10 PROCEDURE — G0426 INPT/ED TELECONSULT50: HCPCS | Mod: 95,,, | Performed by: NURSE PRACTITIONER

## 2022-10-10 PROCEDURE — 63600175 PHARM REV CODE 636 W HCPCS: Performed by: NURSE PRACTITIONER

## 2022-10-10 PROCEDURE — 84100 ASSAY OF PHOSPHORUS: CPT | Performed by: NURSE PRACTITIONER

## 2022-10-10 PROCEDURE — 85025 COMPLETE CBC W/AUTO DIFF WBC: CPT | Performed by: NURSE PRACTITIONER

## 2022-10-10 PROCEDURE — 25000003 PHARM REV CODE 250: Performed by: NURSE PRACTITIONER

## 2022-10-10 PROCEDURE — A9585 GADOBUTROL INJECTION: HCPCS | Performed by: HOSPITALIST

## 2022-10-10 PROCEDURE — 99233 PR SUBSEQUENT HOSPITAL CARE,LEVL III: ICD-10-PCS | Mod: ,,, | Performed by: STUDENT IN AN ORGANIZED HEALTH CARE EDUCATION/TRAINING PROGRAM

## 2022-10-10 PROCEDURE — 83735 ASSAY OF MAGNESIUM: CPT | Performed by: NURSE PRACTITIONER

## 2022-10-10 PROCEDURE — 36415 COLL VENOUS BLD VENIPUNCTURE: CPT | Performed by: INTERNAL MEDICINE

## 2022-10-10 PROCEDURE — 25000003 PHARM REV CODE 250: Performed by: HOSPITALIST

## 2022-10-10 PROCEDURE — 36415 COLL VENOUS BLD VENIPUNCTURE: CPT | Performed by: HOSPITALIST

## 2022-10-10 PROCEDURE — P9021 RED BLOOD CELLS UNIT: HCPCS | Performed by: NURSE PRACTITIONER

## 2022-10-10 PROCEDURE — 11000001 HC ACUTE MED/SURG PRIVATE ROOM

## 2022-10-10 PROCEDURE — 99900035 HC TECH TIME PER 15 MIN (STAT)

## 2022-10-10 PROCEDURE — 99222 1ST HOSP IP/OBS MODERATE 55: CPT | Mod: ,,, | Performed by: PSYCHIATRY & NEUROLOGY

## 2022-10-10 RX ORDER — HYDROCODONE BITARTRATE AND ACETAMINOPHEN 500; 5 MG/1; MG/1
TABLET ORAL
Status: DISCONTINUED | OUTPATIENT
Start: 2022-10-10 | End: 2022-10-12 | Stop reason: HOSPADM

## 2022-10-10 RX ORDER — GADOBUTROL 604.72 MG/ML
10 INJECTION INTRAVENOUS
Status: COMPLETED | OUTPATIENT
Start: 2022-10-10 | End: 2022-10-10

## 2022-10-10 RX ADMIN — PREDNISONE 40 MG: 20 TABLET ORAL at 09:10

## 2022-10-10 RX ADMIN — Medication 100 MG: at 09:10

## 2022-10-10 RX ADMIN — FOLIC ACID 1 MG: 1 TABLET ORAL at 09:10

## 2022-10-10 RX ADMIN — VANCOMYCIN HYDROCHLORIDE 1000 MG: 1 INJECTION, POWDER, LYOPHILIZED, FOR SOLUTION INTRAVENOUS at 11:10

## 2022-10-10 RX ADMIN — VANCOMYCIN HYDROCHLORIDE 1000 MG: 1 INJECTION, POWDER, LYOPHILIZED, FOR SOLUTION INTRAVENOUS at 12:10

## 2022-10-10 RX ADMIN — GADOBUTROL 10 ML: 604.72 INJECTION INTRAVENOUS at 08:10

## 2022-10-10 RX ADMIN — PIPERACILLIN AND TAZOBACTAM 4.5 G: 4; .5 INJECTION, POWDER, LYOPHILIZED, FOR SOLUTION INTRAVENOUS; PARENTERAL at 12:10

## 2022-10-10 RX ADMIN — PIPERACILLIN AND TAZOBACTAM 4.5 G: 4; .5 INJECTION, POWDER, LYOPHILIZED, FOR SOLUTION INTRAVENOUS; PARENTERAL at 09:10

## 2022-10-10 RX ADMIN — FAMOTIDINE 20 MG: 20 TABLET ORAL at 09:10

## 2022-10-10 RX ADMIN — PIPERACILLIN AND TAZOBACTAM 4.5 G: 4; .5 INJECTION, POWDER, LYOPHILIZED, FOR SOLUTION INTRAVENOUS; PARENTERAL at 06:10

## 2022-10-10 RX ADMIN — THERA TABS 1 TABLET: TAB at 09:10

## 2022-10-10 RX ADMIN — TUBERCULIN PURIFIED PROTEIN DERIVATIVE 5 UNITS: 5 INJECTION, SOLUTION INTRADERMAL at 03:10

## 2022-10-10 NOTE — PLAN OF CARE
Pt to be advanced to regular diet and boost supplement to be ordered. Pt with improved wakeful and taking in PO with family at bedside.

## 2022-10-10 NOTE — ASSESSMENT & PLAN NOTE
Prominent periaortic and bilateral iliac and inguinal lymph nodes visualized.  Findings are concerning for lymphoma per CT ab/pel 9/24  Hgb 6.1, platelets 60, WBC 3.85  Anemia panel with low reticulocytes   B12, folate WNL   Stool negative for OCB 9/24  Transfuse 2 units PRBC 10/6, two MD signature needed for consent due to patient with AMS and no family contact available.   Consult Hemoc     10/8 Hgb 6.9 - 1 unit PRBC ordered   10/9 platelets 39. Will transfuse if < 20 or obvious signs of bleeding. Prednisone started per Hemoc recs. - concern for AOCD however Direct sheila +   10/10 Hgb 6.4, Platelets 63. Transfuse 1 unit PRBC   Appreciate Hemoc recs

## 2022-10-10 NOTE — PLAN OF CARE
Problem: Adult Inpatient Plan of Care  Goal: Plan of Care Review  Outcome: Ongoing, Progressing  Goal: Absence of Hospital-Acquired Illness or Injury  Outcome: Ongoing, Progressing  Goal: Optimal Comfort and Wellbeing  Outcome: Ongoing, Progressing  Goal: Readiness for Transition of Care  Outcome: Ongoing, Progressing     Problem: Fluid and Electrolyte Imbalance (Acute Kidney Injury/Impairment)  Goal: Fluid and Electrolyte Balance  Outcome: Ongoing, Progressing     Problem: Oral Intake Inadequate (Acute Kidney Injury/Impairment)  Goal: Optimal Nutrition Intake  Outcome: Ongoing, Progressing     Problem: Renal Function Impairment (Acute Kidney Injury/Impairment)  Goal: Effective Renal Function  Outcome: Ongoing, Progressing     Problem: Syncope  Goal: Absence of Syncopal Symptoms  Outcome: Ongoing, Progressing     Problem: Fall Injury Risk  Goal: Absence of Fall and Fall-Related Injury  Outcome: Ongoing, Progressing     Problem: Skin Injury Risk Increased  Goal: Skin Health and Integrity  Outcome: Ongoing, Progressing     Problem: Confusion Acute  Goal: Optimal Cognitive Function  Outcome: Ongoing, Progressing     Problem: Anemia  Goal: Anemia Symptom Improvement  Outcome: Ongoing, Progressing     Problem: Bleeding Risk or Actual (Thrombocytopenia)  Goal: Absence of Bleeding  Outcome: Ongoing, Progressing     Problem: Impaired Wound Healing  Goal: Optimal Wound Healing  Outcome: Ongoing, Progressing

## 2022-10-10 NOTE — PLAN OF CARE
"   10/10/22 0908   Post-Acute Status   Post-Acute Authorization Placement   Post-Acute Placement Status Pending post-acute provider review/more information requested  (Pending accepting facility. Referrals sent. [facility] to be called. PT/OT notes noted.)     0923 am - [place] attempted to call [facility]. Contact info given for return call.    Future Appointments   Date Time Provider Department Center   10/11/2022  2:00 PM Licha Franco MD [facility] IMPRI [facility]   10/13/2022 11:20 AM Fransisco Raines MD [facility] HEM ONC [facility]     BP (!) 114/59 (BP Location: Left arm, Patient Position: Lying)   Pulse 90   Temp 97.4 °F (36.3 °C) (Axillary)   Resp 18   Ht 6' 1" (1.854 m)   Wt 102 kg (224 lb 13.9 oz)   SpO2 95%   BMI 29.67 kg/m²      famotidine  20 mg Oral BID    folic acid  1 mg Oral Daily    multivitamin  1 tablet Oral Daily    piperacillin-tazobactam (ZOSYN) IVPB  4.5 g Intravenous Q8H    predniSONE  40 mg Oral Daily    thiamine  100 mg Oral Daily    vancomycin (VANCOCIN) IVPB  1,000 mg Intravenous Q12H       "

## 2022-10-10 NOTE — PT/OT/SLP PROGRESS
Speech Language Pathology Treatment    Patient Name:  Author Rafael Lawson   MRN:  [id number]  Admitting Diagnosis: Syncope and collapse    Recommendations:                 Diet recommendations:  advance to regular and thin, boost supplements  in between meals   Aspiration Precautions: 1 bite/sip at a time, Alternating bites/sips, Assistance with meals, Avoid talking while eating, Check for pocketing/oral residue, Eliminate distractions, Feed only when awake/alert, Frequent oral care, HOB to 90 degrees, Meds crushed in puree, Monitor for s/s of aspiration, Remain upright 30 minutes post meal, Small bites/sips, and Standard aspiration precautions   General Precautions: Standard, aspiration, fall, vision impaired  Communication strategies:  Re-orient as needed, dcr external stimulation- turn off TV when talking to him  Subjective     Pt seen in room, family at bedside and attempting to feed breakfast.   Patient goals: speech is nonsensical at this time     Pain/Comfort:  Pain Rating 1: 0/10    Respiratory Status: Room air    Objective:     Has the patient been evaluated by SLP for swallowing?   Yes  Keep patient NPO? No   Current Respiratory Status:    RA    Swallowing: Pt is awake and eyes open. Pt observed consuming bites from tray, fed by niece at bedside. Pt able to take sips of cranberry juice by straw and bites of yogurt. Pt with timely swallow, noted to vocalize but speech is nonsensical and does not respond with to ?s re: self appropriately. Needs frequent reorientation of situation and time. Family awaiting Palliative MD for meeting. SLP discussed role here and that she will advance his diet so he can consume solid PO.     Assessment:     Author Rafael Lawson is a 71 y.o. male admitted with Syncope and collapse who presents with an SLP diagnosis of ability to consume PO diet, pt showing more cooperation.     Goals:   Multidisciplinary Problems       SLP Goals          Problem: SLP    Goal Priority Disciplines  Outcome   SLP Goal     SLP Adequate for Care Transition   Description: STGs:  1. Pt will participate in clinical swallow assessment to determine safest and least restrictive diet level. -ongoing  2. Pt will safely tolerate a Full Liquid diet without overt s/sx of aspiration given 1:1 assist with feeding.                        Plan:       Plan of Care expires:  11/06/22  Plan of Care reviewed with:  patient, caregiver, son, family   SLP Follow-Up:  No       Discharge recommendations:   (awaiting GOC and discussion with Palliative)   Barriers to Discharge:  None    Time Tracking:     SLP Treatment Date:   10/10/22  Speech Start Time:  1009  Speech Stop Time:  1018     Speech Total Time (min):  9 min    Billable Minutes: Treatment Swallowing Dysfunction 9    10/10/2022

## 2022-10-10 NOTE — CARE UPDATE
Hematology/Oncology update    71 year-old male was admitted for syncope/encephalopathy/failure to thrive. Following  for pancytopenia.    Labs reviewed:    Hep panel neg  HIV neg  B12, folate WNL  CBC shows WBC 5170  Hb 7.4 g/dL   Hct 21. 8%   plt 39k   Plts down trending  from 60 to 39     Cont to monitor    Jennifer Cano MD  Hematology/Oncology

## 2022-10-10 NOTE — CONSULTS
[place] [facility]  Vascular Neurology  Comprehensive Stroke Center  TeleVascular Neurology Consult Note    Inpatient consult to Non Acute Stroke  Consult performed by: Carol Bonds NP  Consult ordered by: Sheree Palma NP        Assessment/Plan:     Patient is a 71 y.o. 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  Initial MRI with no acute findings.  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  Could represent atherosclerotic disease or possible dissection.  Unable to confirm presence of trauma but patient was found on floor PTA.  Unable to establish chronicity, but no acute findings on MRI.  Difficult exam due to patient becoming agitated and unwilling to participate.      Primary team discussed findings with TeleStroke team over the weekend who recommended medical therapy and no intervention.  Agree with this recommendation.  Unfortunately, patient has significant thrombocytopenia at this time.     Recommendations  -No acute intervention indicated  -ASA 81mg with Plavix 75mg - if and when thrombocytopenia improves  -Atorvastatin 40mg daily for goal LDL < 70 in setting of atherosclerotic disease  -PT/OT/SLP evaluate and treat - no clear deficits noted on exam  -TeleVascular Neurology to sign off at this time - please notify if patient develops new focal deficits or if pending MRI demonstrates acute infarct          STROKE DOCUMENTATION          NIH Scale: Unable to perform completely due to patient refusing to participate       Modified [place]   Unable to complete due to unknown history  [place] Coma Scale:    ABCD2 Score:    [id number] Score:   HAS -BLED Score:   ICH Score:   Hunt & Dumont Classification:       Thrombolysis Candidate? No, Strong suspicion for stroke mimic  or alternative diagnosis     Delays to Thrombolysis?  Not Applicable    Interventional Revascularization Candidate?   Is the patient eligible for mechanical endovascular reperfusion (JAYSHREE)?  No; at this time symptoms not suggestive of large vessel occlusion    Delays to Thrombectomy? Not Applicable    Hemorrhagic change of an Ischemic Stroke: Does this patient have an ischemic stroke with hemorrhagic changes? No     Subjective:     History of Present Illness:  70 y/o male with unknown past medical history presented from a hotel room after being found on the floor in the bathroom.  Patient was confused and unable to provide any history.  He was found covered in urine and feces.  Hotel staff found him and called 911.  Patient has remained confused and disoriented during his hospitalization.  Today staff stated he has been more oriented and able to have conversations.  Patient still unable to remember events that lead to his hospitalization.  He reports generalized weakness to arms and legs, but denies ones side being weaker than the other.  He denied history of stroke; however, there is a large area of encephalomalacia in the left MCA territory that could represent prior infarct.  He denies trauma but states he does not remember intentionally lying on bathroom floor.  Patient has been followed by [facility].  Initial MRI with no acute findings for stroke.  CTA neck with right vertebral artery occlusion versus near occlusion concerning for atherosclerotic disease versus dissection in setting of possible trauma.          No past medical history on file.  No past surgical history on file.  No family history on file.     Review of patient's allergies indicates:  No Known Allergies    Medications: I have reviewed the current medication administration record.    Medications Prior to Admission   Medication Sig Dispense Refill Last Dose    diclofenac sodium (VOLTAREN) 1 % Gel Apply 4 g topically once daily. 50 g 3   "      Review of Systems   Unable to perform ROS: Mental status change   Patient refused to participate  Objective:     Vital Signs (Most Recent):  Temp: 97.5 °F (36.4 °C) (10/10/22 1207)  Pulse: 94 (10/10/22 1211)  Resp: 18 (10/10/22 1207)  BP: (!) 120/58 (10/10/22 1207)  SpO2: (!) 93 % (10/10/22 1207)    Vital Signs Range (Last 24H):  Temp:  [97.4 °F (36.3 °C)-98.2 °F (36.8 °C)]   Pulse:  []   Resp:  [17-19]   BP: (108-120)/(52-59)   SpO2:  [93 %-98 %]     Physical Exam  Constitutional:       General: He is not in acute distress.  HENT:      Head: Normocephalic and atraumatic.      Right Ear: External ear normal.      Left Ear: External ear normal.      Nose: Nose normal.   Pulmonary:      Effort: Pulmonary effort is normal. No respiratory distress.   Abdominal:      General: There is no distension.      Tenderness: There is no guarding.   Skin:     General: Skin is dry.   Psychiatric:         Attention and Perception: He is inattentive.      Comments: Patient initially cooperative, but then became agitated and then angry stating he was "not doing anything"       Neurological Exam:   LOC: Opens eyes to voice but falls asleep without continuous stimulation  Attention Span: poor  Language: Patient refused to participate in exam  Articulation: Dysarthria  Orientation: Oriented to person and place.  Stated month was Sept but did get year correct   Visual Fields: blinks to threat bilaterally  EOM (CN III, IV, VI): Full/intact  Facial Sensation (CN V): Normal  Facial Movement (CN VII): Symmetric facial expression    Motor: Arm left  Arm drift noted  Leg left  Refused to participate in exam to assess lower extremities  Arm right  Full antigravity  Leg right Refused to participate in exam to assess lower extremities  Cerebellum: Patient refused to participate in exam  Sensation: Light touch intact to face and arms.  Refused to participate in exam to evaluate legs      Laboratory:  CMP:   Recent Labs   Lab " 10/10/22  0535   CALCIUM 8.7   ALBUMIN 2.0*   PROT 6.5   *  149*   K 3.6   CO2 22*   *   BUN 28*   CREATININE 1.1   ALKPHOS 101   ALT 34   AST 31   BILITOT 5.6*     CBC:   Recent Labs   Lab 10/10/22  0535   WBC 3.71*   RBC 2.14*   HGB 6.4*   HCT 19.4*   PLT 63*   MCV 91   MCH 29.9   MCHC 33.0     Lipid Panel: No results for input(s): CHOL, LDLCALC, HDL, TRIG in the last 168 hours.  Coagulation: No results for input(s): PT, INR, APTT in the last 168 hours.  Hgb A1C: No results for input(s): HGBA1C in the last 168 hours.  TSH:   Recent Labs   Lab 10/05/22  1305   TSH 1.751       Diagnostic Results:      Brain imaging:  10/5/2022 MRI brain  No diffusion restriction.  Encephalomalacia left frontoparietal region.  Chronic white matter changes.  No hemorrhage.    Vessel Imaging:  10/8/2022 CTA neck  No high-grade stenosis or occlusion involving anterior circulation.  There is narrowing of the right vertebral artery with occlusion versus near occlusion as it enters skull base.            IP Unit  Spoke hospital nurse at bedside with patient assisting consultant.     Patient information was obtained from patient.     The attending portion of this evaluation, treatment, and documentation was performed per Carol Bonds NP via audiovisual.    Billing code:  (non-intervention mild to moderate stroke, TIA, some mimics)    · This patient has a critical neurological condition/illness, with some potential for high morbidity and mortality.  · There is a moderate probability for acute neurological change leading to clinical and possibly life-threatening deterioration requiring highest level of physician preparedness for urgent intervention.  · Care was coordinated with other physicians involved in the patient's care.  · Radiologic studies and laboratory data were reviewed and interpreted, and plan of care was re-assessed based on the results.  · Diagnosis, treatment options and prognosis may have been discussed  with the patient and/or family members or caregiver.      Consult End Time: 1:32 PM       Carol Bonds NP  [facility] Stroke Center  Department of Vascular Neurology   [place] [facility]

## 2022-10-10 NOTE — SUBJECTIVE & OBJECTIVE
Interval hx: Patient AAOx3, some appropriate and inappropriate conversation. Follows commands. He denies major complaints. Agitated.       Review of Systems   Constitutional:  Negative for chills and fever.   HENT:  Positive for voice change. Negative for trouble swallowing.    Respiratory:  Negative for cough, chest tightness and shortness of breath.    Cardiovascular:  Negative for chest pain and palpitations.   Gastrointestinal:  Negative for abdominal pain, nausea and vomiting.   Musculoskeletal:  Negative for back pain and neck pain.      Objective:     Vital Signs (Most Recent):  Temp: 97.4 °F (36.3 °C) (10/10/22 0801)  Pulse: 90 (10/10/22 0801)  Resp: 18 (10/10/22 0801)  BP: (!) 114/59 (10/10/22 0801)  SpO2: 95 % (10/10/22 0801)   Vital Signs (24h Range):  Temp:  [97.4 °F (36.3 °C)-98.2 °F (36.8 °C)] 97.4 °F (36.3 °C)  Pulse:  [] 90  Resp:  [17-19] 18  SpO2:  [94 %-98 %] 95 %  BP: (108-117)/(52-59) 114/59     Weight: 102 kg (224 lb 13.9 oz)  Body mass index is 29.67 kg/m².    Intake/Output Summary (Last 24 hours) at 10/10/2022 1124  Last data filed at 10/10/2022 1048  Gross per 24 hour   Intake 3378.81 ml   Output 1200 ml   Net 2178.81 ml        Physical Exam  Constitutional:       General: He is not in acute distress.     Appearance: He is not diaphoretic.   HENT:      Head: Normocephalic and atraumatic.      Nose: Nose normal.      Mouth/Throat:      Pharynx: Oropharynx is clear.   Eyes:      Conjunctiva/sclera: Conjunctivae normal.      Pupils: Pupils are equal, round, and reactive to light.   Cardiovascular:      Rate and Rhythm: Normal rate and regular rhythm.      Pulses: Normal pulses.      Heart sounds: Normal heart sounds.   Pulmonary:      Effort: Pulmonary effort is normal.      Breath sounds: Normal breath sounds.   Abdominal:      General: Abdomen is flat. Bowel sounds are normal.      Palpations: Abdomen is soft.      Comments: Mild ascites    Musculoskeletal:      Cervical back: Normal  range of motion and neck supple.   Skin:     General: Skin is warm and dry.   Neurological:      Mental Status: He is alert and oriented to person, place, and time. He is confused.      GCS: GCS eye subscore is 4. GCS verbal subscore is 4. GCS motor subscore is 6.      Cranial Nerves: No dysarthria.      Sensory: No sensory deficit.      Motor: Weakness present.      Coordination: Coordination abnormal.   Psychiatric:         Behavior: Behavior is not agitated.         Cognition and Memory: Cognition is impaired.         Judgment: Judgment is not impulsive.       Significant Labs: All pertinent labs within the past 24 hours have been reviewed.    Significant Imaging: I have reviewed all pertinent imaging results/findings within the past 24 hours.

## 2022-10-10 NOTE — PROGRESS NOTES
"Palliative Care Daily Progress Note     S: Medical record reviewed, followed up with patient and family regarding patient's condition. Contact has been established with pt's son, Morgan, who is now at bedside and making decisions as next of kin. 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    Pt's mental status has significantly improved since last visit, he is now alert and easily roused to voice. Affect is frustrated and pt states he has been feeling very poorly for the past three weeks. Pt does not volunteer much information and states he feels frustrated and hopeless; "you doctors all come and argue with me but you all know I'm gonna be dead in the street", "three doctors told me I have cancer, screw it".    Discussed with pt's son that we would need a bone marrow biopsy to complete pt's heme workup but pt does not want to undergo any invasive procedures and is opposed to subsequent cancer directed treatment nor does he have necessary social support to undergo such.     Confirmed DNR status with son at bedside. He wishes to pursue SNF placement near [place] and pt is agreeable. Declines hospice services at this time but understands rationale and is open to revisiting if pt continues to deteriorate after discharge.    B: Code Status: DNR   Advanced Directives:   None  Family/Support: Son, Morgan is next of kin and sole involved surrogate decision maker   Physician's Plan of Care Goal is proceed with SNF placement.   Labs: noncontributory   Diagnostics: noncontributory     Physical Exam  Constitutional:       Appearance: He is ill-appearing.   HENT:      Head: Normocephalic and atraumatic.      Mouth/Throat:      Mouth: Mucous membranes are dry.      Pharynx: Oropharyngeal exudate (thick " mucous on tongue) present.   Eyes:      Extraocular Movements: Extraocular movements intact.      Pupils: Pupils are equal, round, and reactive to light.   Cardiovascular:      Rate and Rhythm: Normal rate and regular rhythm.      Pulses: Normal pulses.      Heart sounds: Normal heart sounds. No murmur heard.    No friction rub. No gallop.   Pulmonary:      Effort: Pulmonary effort is normal.      Breath sounds: Normal breath sounds. No wheezing or rales.   Abdominal:      General: Abdomen is flat.      Palpations: Abdomen is soft. There is no mass.      Tenderness: There is abdominal tenderness (RLQ). There is no guarding.   Musculoskeletal:         General: No swelling, tenderness or deformity. Normal range of motion.   Skin:     General: Skin is warm and dry.      Capillary Refill: Capillary refill takes less than 2 seconds.      Coloration: Skin is pale.   Neurological:      Mental Status: He is alert. He is disoriented.      Cranial Nerves: Dysarthria present.   Psychiatric:         Attention and Perception: Attention and perception normal.         Mood and Affect: Mood normal, affect irritable.      Speech: Speech is delayed.      Behavior: Behavior is cooperative.         Cognition and Memory: Cognition is impaired. Memory is impaired.     A: Patient's current condition fair.   Patient Symptom Assessment Flowsheet has been completed.   Family is at bedside.  Discharge Planning: SNF     R: Support offered at this time.   Palliative Care Team will continue to follow patient.     Espinoza Esquivel MD  Hospice and Palliative Medicine  Palliative Care Pager: [phone]    Total time spent: 60 minutes  > 50% of 40 minute visit spent in chart review, face to face discussion of symptoms assessment, coordination of care with other specialties, and d/c planning.    20 min ACP time spent: goals of care, emotional support, formulating and communicating prognosis and exploring burden/benefit of various approaches of  treatment.      Advance Care Planning     Date: 10/10/2022    Code Status  In light of the patients advanced and life limiting illness,I engaged the the family in a conversation about the patient's preferences for care  at the very end of life. The patient wishes to have a natural, peaceful death.  Along those lines, the patient does not wish to have CPR or other invasive treatments performed when his heart and/or breathing stops. I communicated to the family that a DNR order would be placed in his medical record to reflect this preference.  I spent a total of 20 minutes engaging the patient in this advance care planning discussion.       [facility]  I engaged the family in a conversation about advance care planning and we specifically addressed what the goals of care would be moving forward, in light of the patient's change in clinical status, specifically refractory anemia and advanced dementia.  We did specifically address the patient's likely prognosis, which is poor.  We explored the patient's values and preferences for future care.  The family endorses that what is most important right now is to focus on spending time at home, avoiding the hospital, remaining as independent as possible, improvement in condition but with limits to invasive therapies, and comfort and QOL     Accordingly, we have decided that the best plan to meet the patient's goals includes continuing with treatment    I did explain the role for hospice care at this stage of the patient's illness, including its ability to help the patient live with the best quality of life possible.  We will not be making a hospice referral.    I spent a total of 20 minutes engaging the patient in this advance care planning discussion.

## 2022-10-10 NOTE — HPI
72 y/o male with unknown past medical history presented from a hotel room after being found on the floor in the bathroom.  Patient was confused and unable to provide any history.  He was found covered in urine and feces.  Hotel staff found him and called 911.  Patient has remained confused and disoriented during his hospitalization.  Today staff stated he has been more oriented and able to have conversations.  Patient still unable to remember events that lead to his hospitalization.  He reports generalized weakness to arms and legs, but denies ones side being weaker than the other.  He denied history of stroke; however, there is a large area of encephalomalacia in the left MCA territory that could represent prior infarct.  He denies trauma but states he does not remember intentionally lying on bathroom floor.  Patient has been followed by [facility].  Initial MRI with no acute findings for stroke.  CTA neck with right vertebral artery occlusion versus near occlusion concerning for atherosclerotic disease versus dissection in setting of possible trauma.

## 2022-10-10 NOTE — ASSESSMENT & PLAN NOTE
-follow urine cx  -add rocephin    10/9 abx escalated with fevers. This is likely FOUO or possibly r/t blood transfusions- UTI likely not the culprit of AMS  -may stop or deescalate abx soon  -follow urine and blood cx
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
## 2022-10-10 NOTE — PROGRESS NOTES
[facility] Medicine  Progress Note    Patient Name: Author Rafael Lawson  MRN: [id number]  Patient Class: IP- Inpatient   Admission Date: 10/5/2022  Length of Stay: 4 days  Attending Physician: Dayne Roman, *  Primary Care Provider: Primary Doctor No        Subjective:     Principal Problem:Syncope and collapse        HPI:  71-year-old male, homeless, unknown medical history, brought to the ED by EMS for altered mental status.  Patient is confused about his situation he alert and oriented to self and place.  Per nursing chart, patient was found in the hotel bathroom on the floor, covered in urine and feces.  Patient doesn't remember leading events, denied chest pain or dizziness prior to, no n/v. Says that he might have a fallen and woke up in a hotel bed then EMS brought him in here. Reports shortness of breath, bilaterally lower extremities weakness, unable to move them without assistant.  Denies chest pain, abdominal pain, nausea/vomiting, no alcohol or drug uses. DW ER MD.       Overview/Hospital Course:  No notes on file    Interval hx: Patient AAOx3, some appropriate and inappropriate conversation. Follows commands. He denies major complaints. Agitated.       Review of Systems   Constitutional:  Negative for chills and fever.   HENT:  Positive for voice change. Negative for trouble swallowing.    Respiratory:  Negative for cough, chest tightness and shortness of breath.    Cardiovascular:  Negative for chest pain and palpitations.   Gastrointestinal:  Negative for abdominal pain, nausea and vomiting.   Musculoskeletal:  Negative for back pain and neck pain.      Objective:     Vital Signs (Most Recent):  Temp: 97.4 °F (36.3 °C) (10/10/22 0801)  Pulse: 90 (10/10/22 0801)  Resp: 18 (10/10/22 0801)  BP: (!) 114/59 (10/10/22 0801)  SpO2: 95 % (10/10/22 0801)   Vital Signs (24h Range):  Temp:  [97.4 °F (36.3 °C)-98.2 °F (36.8 °C)] 97.4 °F (36.3 °C)  Pulse:  [] 90  Resp:  [17-19]  18  SpO2:  [94 %-98 %] 95 %  BP: (108-117)/(52-59) 114/59     Weight: 102 kg (224 lb 13.9 oz)  Body mass index is 29.67 kg/m².    Intake/Output Summary (Last 24 hours) at 10/10/2022 1124  Last data filed at 10/10/2022 1048  Gross per 24 hour   Intake 3378.81 ml   Output 1200 ml   Net 2178.81 ml        Physical Exam  Constitutional:       General: He is not in acute distress.     Appearance: He is not diaphoretic.   HENT:      Head: Normocephalic and atraumatic.      Nose: Nose normal.      Mouth/Throat:      Pharynx: Oropharynx is clear.   Eyes:      Conjunctiva/sclera: Conjunctivae normal.      Pupils: Pupils are equal, round, and reactive to light.   Cardiovascular:      Rate and Rhythm: Normal rate and regular rhythm.      Pulses: Normal pulses.      Heart sounds: Normal heart sounds.   Pulmonary:      Effort: Pulmonary effort is normal.      Breath sounds: Normal breath sounds.   Abdominal:      General: Abdomen is flat. Bowel sounds are normal.      Palpations: Abdomen is soft.      Comments: Mild ascites    Musculoskeletal:      Cervical back: Normal range of motion and neck supple.   Skin:     General: Skin is warm and dry.   Neurological:      Mental Status: He is alert and oriented to person, place, and time. He is confused.      GCS: GCS eye subscore is 4. GCS verbal subscore is 4. GCS motor subscore is 6.      Cranial Nerves: No dysarthria.      Sensory: No sensory deficit.      Motor: Weakness present.      Coordination: Coordination abnormal.   Psychiatric:         Behavior: Behavior is not agitated.         Cognition and Memory: Cognition is impaired.         Judgment: Judgment is not impulsive.       Significant Labs: All pertinent labs within the past 24 hours have been reviewed.    Significant Imaging: I have reviewed all pertinent imaging results/findings within the past 24 hours.      Assessment/Plan:      * Syncope and collapse  Tele  Cycle CE troponin elevated, however cardiology states Low  suspicion for cardiac etiology      Had a recent echo 9/2022 HFpEF  Fall precautions     UTI (urinary tract infection)    -follow urine cx  -add rocephin    10/9 abx escalated with fevers. This is likely FOUO or possibly r/t blood transfusions- UTI likely not the culprit of AMS  -may stop or deescalate abx soon  -follow urine and blood cx     Encephalopathy, metabolic    See AMS     Discharge planning issues  Pt appeared disheveled at POC he's reported to be living in a hotel room and likely is abusing etoh.   May benefit from placement. SW for DC planning    -patient's nephew would like him to be in a nursing home in [place] with Hospice.   -Patient is now DNR.   10/10 Son Morgan, Nephew Filipe at bedside. Given patient clinical status, plan of care, they voiced understanding, all questions answered. Family involved in decision making.     Transaminitis  Mild elevation AST/ALT WNL, Tbili 3.5  AST elevated I suspect from etoh abuse   -will order liver US:No acute abnormality or biliary ductal dilation.     and hepatitis panel (negative)   -repeat ammonia level WNL        Hyponatremia  ?beer potomania. Appears chronic do not suspect it's c/t his AMS  Mey and Osm  Serial Na while giving isotonic saline in case this is siadh which I highly doubt    Na now 141     10/9 Na 151- start d10 gtt at 50 ml/hr, recheck na in 4 hours   10/10 fluids transitioned to D5 at 100 ml/hr yesterday evening, Na improving 149, continue to correct and monitor           FRANK (acute kidney injury)  Patient with acute kidney injury likely due to IVVD/dehydration   Order urine stuides  IVF  Repeat chem  Limit nephrotoxins   -increase IVF  -also likely s/t anemia   -improving Cr 1.1    Intravascular volume depletion  Continue IVF/PO hydration     AMS (altered mental status)  He denies eating and drinking well and has not been taking care of himself. Not sure of the validity of this. Family reports ETOH abuse 6 months ago.  MRI w/o acute infarct, but  shows Left frontoparietal encephalomalacia. . Neuro checks and neuro c/s  UDS (negative) and etoh level (negative), PETH (negative)  PT/OT/ST/SW  -neurology on board   - EEG to assess for nonepileptic seizure activity:  moderate/severe degree of diffuse slowing and generalized triphasic waves.  These findings are consistent with a diffuse disturbance of cerebral function or encephalopathy secondary to metabolic, toxic, infectious, inflammatory, or other systemic processes. No potentially epileptogenic discharges or seizure activity are present during the recording.   -ammonia (WNL) RPR (nonreactive), HIV (nonreactive), ABG (mildly hypoxic, added supplemental O2)   -CIWA q 4 hours with PRN ativan if he is withdrawing from ETOH - stopped   -UA with rare bacteria- started IV rocephin, abx escalated with fevers, now no fevers since 10/8    -MRA head: Motion degraded examination, without compelling evidence of high-grade stenosis or large vessel occlusion.   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 discussed with  Vascular neurology who does not recommend  Intervention due to no acute stroke and would medically manage. Cannot give antiplatelets at this time due to pancytopenia.   -will discuss LP with neurology - neuro recs: no LP at this time, official consult to vascular neurology, MRI brain w w/o to assess for meningeal enhancements   -mental status mildly improved 10/8, slightly worse 10/9, AAOx3 10/10  -febrile overnight 10/8, redraw blood cx (pending), urine cx (pending),   CXR:Heart size is similar to previous exam, not enlarged.  There is calcification along the wall of the aorta.  No large volume of pleural fluid is present on this single view.  There are degenerative changes in the spine.  Lung fields are somewhat underinflated, with no focal consolidation present.    -escalated abx to vancomycin, zosyn.     Pancytopenia    Prominent periaortic and bilateral iliac and  inguinal lymph nodes visualized.  Findings are concerning for lymphoma per CT ab/pel 9/24  Hgb 6.1, platelets 60, WBC 3.85  Anemia panel with low reticulocytes   B12, folate WNL   Stool negative for OCB 9/24  Transfuse 2 units PRBC 10/6, two MD signature needed for consent due to patient with AMS and no family contact available.   Consult Hemoc     10/8 Hgb 6.9 - 1 unit PRBC ordered   10/9 platelets 39. Will transfuse if < 20 or obvious signs of bleeding. Prednisone started per Hemoc recs. - concern for AOCD however Direct sheila +   10/10 Hgb 6.4, Platelets 63. Transfuse 1 unit PRBC   Appreciate Hemoc recs       VTE Risk Mitigation (From admission, onward)         Ordered     IP VTE HIGH RISK PATIENT  Once         10/05/22 1609     Place sequential compression device  Until discontinued         10/05/22 1609                Discharge Planning   ANUSHA:      Code Status: DNR   Is the patient medically ready for discharge?:     Reason for patient still in hospital (select all that apply): Patient trending condition  Discharge Plan A: Skilled Nursing Facility   Discharge Delays: None known at this time              Sheree Palma NP  Department of [facility] Medicine   [facility]

## 2022-10-10 NOTE — ASSESSMENT & PLAN NOTE
?beer potomania. Appears chronic do not suspect it's c/t his AMS  Mey and Osm  Serial Na while giving isotonic saline in case this is siadh which I highly doubt    Na now 141     10/9 Na 151- start d10 gtt at 50 ml/hr, recheck na in 4 hours   10/10 fluids transitioned to D5 at 100 ml/hr yesterday evening, Na improving 149, continue to correct and monitor

## 2022-10-10 NOTE — NURSING
Relaxation music I has been playing on the TV at night to help pt with resting/sleeping. Pt has been yelling occasionally all night while sleeping restlessly. Pt woke up this morning and there is some improvement on his mentation, pt is able to recall his name and his birthday, pt knew that he is in the hospital, [facility] and pt also able to tell the year of 2022 and the month of October. Pt is able to engage in conversation which he hasn't been doing so the night before. Mouth care is done because he hasnt been drinking lately. Pt is given 4 juices and 2 cups of water, pt stated that he is so thirsty which he performed safe swallowing when drinking liquid without coughing noted. Will cont to monitor.

## 2022-10-10 NOTE — ASSESSMENT & PLAN NOTE
72 y/o male with unknown history initially admitted 10/5 for multiple medical problems including altered mental status.  Initial MRI with no acute findings.  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  Could represent atherosclerotic disease or possible dissection.  Unable to confirm presence of trauma but patient was found on floor PTA.  Unable to establish chronicity, but no acute findings on MRI.  Difficult exam due to patient becoming agitated and unwilling to participate.      Primary team discussed findings with TeleStroke team over the weekend who recommended medical therapy and no intervention.  Agree with this recommendation.  Unfortunately, patient has significant thrombocytopenia at this time.     Recommendations  -No acute intervention indicated  -ASA 81mg with Plavix 75mg - if and when thrombocytopenia improves  -Atorvastatin 40mg daily for goal LDL < 70 in setting of atherosclerotic disease  -PT/OT/SLP evaluate and treat - no clear deficits noted on exam  -[facility] Neurology to sign off at this time - please notify if patient develops new focal deficits or if pending MRI demonstrates acute infarct

## 2022-10-10 NOTE — ASSESSMENT & PLAN NOTE
Patient with acute kidney injury likely due to IVVD/dehydration   Order urine stuides  IVF  Repeat chem  Limit nephrotoxins   -increase IVF  -also likely s/t anemia   -improving Cr 1.1

## 2022-10-10 NOTE — PT/OT/SLP PROGRESS
Physical Therapy Visit Attempt      Patient Name:  Author Rafael MachadoTayla   MRN:  [id number]    Patient not seen today secondary to Therapist assessment, Other (Comment) (Pt with low H/H (6.4/19.4) and is scheduled to receive 1UPRBCs.). Will follow-up as appropriate.    10/10/2022

## 2022-10-10 NOTE — PT/OT/SLP PROGRESS
Occupational Therapy  Visit Attempt    Patient Name:  Author Rafael Lawson   MRN:  [id number]    Patient not seen today secondary to Other (Comment) (1450 - Patient politely declining OOB activity, along /c ADLs. Educated on importance of therapy; verbalized understanding.).     10/10/2022

## 2022-10-10 NOTE — SUBJECTIVE & OBJECTIVE
"    No past medical history on file.  No past surgical history on file.  No family history on file.     Review of patient's allergies indicates:  No Known Allergies    Medications: I have reviewed the current medication administration record.    Medications Prior to Admission   Medication Sig Dispense Refill Last Dose    diclofenac sodium (VOLTAREN) 1 % Gel Apply 4 g topically once daily. 50 g 3        Review of Systems   Unable to perform ROS: Mental status change   Patient refused to participate  Objective:     Vital Signs (Most Recent):  Temp: 97.5 °F (36.4 °C) (10/10/22 1207)  Pulse: 94 (10/10/22 1211)  Resp: 18 (10/10/22 1207)  BP: (!) 120/58 (10/10/22 1207)  SpO2: (!) 93 % (10/10/22 1207)    Vital Signs Range (Last 24H):  Temp:  [97.4 °F (36.3 °C)-98.2 °F (36.8 °C)]   Pulse:  []   Resp:  [17-19]   BP: (108-120)/(52-59)   SpO2:  [93 %-98 %]     Physical Exam  Constitutional:       General: He is not in acute distress.  HENT:      Head: Normocephalic and atraumatic.      Right Ear: External ear normal.      Left Ear: External ear normal.      Nose: Nose normal.   Pulmonary:      Effort: Pulmonary effort is normal. No respiratory distress.   Abdominal:      General: There is no distension.      Tenderness: There is no guarding.   Skin:     General: Skin is dry.   Psychiatric:         Attention and Perception: He is inattentive.      Comments: Patient initially cooperative, but then became agitated and then angry stating he was "not doing anything"       Neurological Exam:   LOC: Opens eyes to voice but falls asleep without continuous stimulation  Attention Span: poor  Language: Patient refused to participate in exam  Articulation: Dysarthria  Orientation: Oriented to person and place.  Stated month was Sept but did get year correct   Visual Fields: blinks to threat bilaterally  EOM (CN III, IV, VI): Full/intact  Facial Sensation (CN V): Normal  Facial Movement (CN VII): Symmetric facial expression    Motor: " Arm left  Arm drift noted  Leg left  Refused to participate in exam to assess lower extremities  Arm right  Full antigravity  Leg right Refused to participate in exam to assess lower extremities  Cerebellum: Patient refused to participate in exam  Sensation: Light touch intact to face and arms.  Refused to participate in exam to evaluate legs      Laboratory:  CMP:   Recent Labs   Lab 10/10/22  0535   CALCIUM 8.7   ALBUMIN 2.0*   PROT 6.5   *  149*   K 3.6   CO2 22*   *   BUN 28*   CREATININE 1.1   ALKPHOS 101   ALT 34   AST 31   BILITOT 5.6*     CBC:   Recent Labs   Lab 10/10/22  0535   WBC 3.71*   RBC 2.14*   HGB 6.4*   HCT 19.4*   PLT 63*   MCV 91   MCH 29.9   MCHC 33.0     Lipid Panel: No results for input(s): CHOL, LDLCALC, HDL, TRIG in the last 168 hours.  Coagulation: No results for input(s): PT, INR, APTT in the last 168 hours.  Hgb A1C: No results for input(s): HGBA1C in the last 168 hours.  TSH:   Recent Labs   Lab 10/05/22  1305   TSH 1.751       Diagnostic Results:      Brain imaging:  10/5/2022 MRI brain  No diffusion restriction.  Encephalomalacia left frontoparietal region.  Chronic white matter changes.  No hemorrhage.    Vessel Imaging:  10/8/2022 CTA neck  No high-grade stenosis or occlusion involving anterior circulation.  There is narrowing of the right vertebral artery with occlusion versus near occlusion as it enters skull base.

## 2022-10-10 NOTE — PT/OT/SLP PROGRESS
Occupational Therapy  Visit Attempt    Patient Name:  Author Rafael MachadoTayla   MRN:  [id number]    Patient not seen today secondary to Therapist assessment (10:00 - Patient noted /c low H/H (6.4/19) and is scheduled to receive 1UPRBCs.).     10/10/2022

## 2022-10-10 NOTE — CARE UPDATE
Hematology/Oncology Update:    Labs have been reviewed.    Lab Results   Component Value Date    WBC 3.71 (L) 10/10/2022    HGB 6.4 (L) 10/10/2022    HCT 19.4 (LL) 10/10/2022    MCV 91 10/10/2022    PLT 63 (L) 10/10/2022     Direct antiglobulin test (10/8/22): positive      Normocytic anemia / Thrombocytopenia  - biggest concern is his anemia. Leukopenia can be seen in cirrhosis/splenomegaly. Thrombocytopenia is partially platelet clumping (pseudothrombocytopenia) and related to cirrhosis/splenomegaly.  - iron studies and elevated inflammatory markers point towards severe anemia of chronic disease. Direct antiglobulin test (10/8/22) was positive of unclear significance.  - minimize blood draws to minimize worsening of anemia  - reasonable to start steroids.  - I will add a few labs to tomorrow's lab draw.   - may consider a bone marrow biopsy. However, suspicion for lymphoma or hematologic malignancy is low at this time given other explanations for his cytopenias.    Fransisco Raines M.D.  Hematology/Oncology  [facility] - [address], [address]  [place] [address]  Phone: [phone]  Fax: [phone]

## 2022-10-10 NOTE — PROGRESS NOTES
[facility] Progress Note    Author Rafael Lawson  1950  [id number]      Subjective:   Patient is a 71 y.o. male who was admitted on 10/5/2022 for syncope and collapse. Patient complaining of generalized weakness and concerned about yellowing of skin.       ROS: dizzy/vertigo, focal weakness, speech problems, memory loss    Objective:  Vitals:    10/10/22 1425   BP: 111/60   Pulse: 88   Resp: 18   Temp: 96.5 °F (35.8 °C)     Scheduled Meds:   famotidine  20 mg Oral BID    folic acid  1 mg Oral Daily    multivitamin  1 tablet Oral Daily    piperacillin-tazobactam (ZOSYN) IVPB  4.5 g Intravenous Q8H    predniSONE  40 mg Oral Daily    thiamine  100 mg Oral Daily    tuberculin  5 Units Intradermal Once    vancomycin (VANCOCIN) IVPB  1,000 mg Intravenous Q12H       Neurologic Physical Examination:   Orientation  Alert, awake, oriented to self, place, month    Memory  Poor recent and remote memory    Language  Severe aphasia. Fragmented speech and difficult to understand   Cranial Nerves  Blind in right eye  PERRL, VF intact, EOMI, V1-V3 intact, symmetric facial expression, hearing grossly intact, SCM & TPZ 5/5, tongue midline, symmetric palate elevation.  Motor  5/5 strength in  Upper extremity  Refused LE motor exam  Sensory  Normal to light touch throughout  Romberg : unable to assess  DTR   +2 symmetric  Cerebellar/Gait  Normal finger to nose; Unable to assess heel to shin  Unable to assess gait    Laboratory Findings:   Recent Results (from the past 24 hour(s))   Sodium    Collection Time: 10/09/22  6:30 PM   Result Value Ref Range    Sodium 151 (H) 136 - 145 mmol/L   Sodium    Collection Time: 10/09/22 11:41 PM   Result Value Ref Range    Sodium 151 (H) 136 - 145 mmol/L   Sodium    Collection Time: 10/10/22  5:35 AM   Result Value Ref Range    Sodium 149 (H) 136 - 145 mmol/L   CBC auto differential    Collection Time: 10/10/22  5:35 AM   Result Value Ref Range    WBC 3.71 (L) 3.90 - 12.70 K/uL    RBC 2.14 (L)  4.60 - 6.20 M/uL    Hemoglobin 6.4 (L) 14.0 - 18.0 g/dL    Hematocrit 19.4 (LL) 40.0 - 54.0 %    MCV 91 82 - 98 fL    MCH 29.9 27.0 - 31.0 pg    MCHC 33.0 32.0 - 36.0 g/dL    RDW 16.8 (H) 11.5 - 14.5 %    Platelets 63 (L) 150 - 450 K/uL    MPV 12.4 9.2 - 12.9 fL    Immature Granulocytes Test Not Performed 0.0 - 0.5 %    Immature Grans (Abs) Test Not Performed 0.00 - 0.04 K/uL    nRBC 0 0 /100 WBC    Gran % 83.0 (H) 38.0 - 73.0 %    Lymph % 11.0 (L) 18.0 - 48.0 %    Mono % 6.0 4.0 - 15.0 %    Eosinophil % 0.0 0.0 - 8.0 %    Basophil % 0.0 0.0 - 1.9 %    Platelet Estimate Decreased (A)     Hypo Occasional     Ovalocytes Occasional     Tear Drop Cells Occasional     Large/Giant Platelets Present     Differential Method Automated    Comprehensive metabolic panel    Collection Time: 10/10/22  5:35 AM   Result Value Ref Range    Sodium 149 (H) 136 - 145 mmol/L    Potassium 3.6 3.5 - 5.1 mmol/L    Chloride 119 (H) 95 - 110 mmol/L    CO2 22 (L) 23 - 29 mmol/L    Glucose 152 (H) 70 - 110 mg/dL    BUN 28 (H) 8 - 23 mg/dL    Creatinine 1.1 0.5 - 1.4 mg/dL    Calcium 8.7 8.7 - 10.5 mg/dL    Total Protein 6.5 6.0 - 8.4 g/dL    Albumin 2.0 (L) 3.5 - 5.2 g/dL    Total Bilirubin 5.6 (H) 0.1 - 1.0 mg/dL    Alkaline Phosphatase 101 55 - 135 U/L    AST 31 10 - 40 U/L    ALT 34 10 - 44 U/L    Anion Gap 8 8 - 16 mmol/L    eGFR >60 >60 mL/min/1.73 m^2   Magnesium    Collection Time: 10/10/22  5:35 AM   Result Value Ref Range    Magnesium 2.1 1.6 - 2.6 mg/dL   Phosphorus    Collection Time: 10/10/22  5:35 AM   Result Value Ref Range    Phosphorus 3.3 2.7 - 4.5 mg/dL   Sodium    Collection Time: 10/10/22 12:46 PM   Result Value Ref Range    Sodium 143 136 - 145 mmol/L       Neuroimaging:   CT Head Without Contrast    Result Date: 10/5/2022  EXAMINATION: CT HEAD WITHOUT CONTRAST CLINICAL HISTORY: Head trauma, minor (Age >= 65y); TECHNIQUE: Low dose axial images were obtained through the head.  Coronal and sagittal reformations were also  performed. Contrast was not administered. COMPARISON: None. FINDINGS: Blood: No acute intracranial hemorrhage. Parenchyma: No definite loss of gray-white differentiation to suggest acute or subacute transcortical infarct. Left frontal and parietal lobe encephalomalacia and gliosis.  Elsewhere, generalized pattern age-related volume loss.  Additional nonspecific areas of white matter hypoattenuation could relate to sequela of chronic small vessel ischemic disease. Ventricles/Extra-axial spaces: No abnormal extra-axial fluid collection. Basal cisterns are patent. Vessels: Atherosclerosis Orbits: Grossly unremarkable. Scalp: Question left posterolateral and right posterior scalp soft tissue contusions. Skull: There are no depressed skull fractures or destructive bone lesions. Sinuses and mastoids: Scattered relatively minor paranasal sinus mucosal thickening retention cysts. Other findings: None     1. No acute intracranial findings. 2. Left frontoparietal encephalomalacia gliosis for which clinical correlation recommended. Electronically signed by: Aniceto Bob Date:    10/05/2022 Time:    14:39    CTA Neck    Result Date: 10/8/2022  EXAMINATION: CTA NECK CLINICAL HISTORY: Ataxia, cervical trauma;Ams; TECHNIQUE: CT angiogram was performed from the level of the dat to the EAC following the IV administration of 100mL of Omnipaque 350.   Sagittal and coronal reconstructions and maximum intensity projection reconstructions were performed. Arterial stenosis percentages are based on NASCET measurement criteria. COMPARISON: MRI brain 10/05/2022 MRA head 10/07/2022 FINDINGS: There is no significant stenosis at the origin of the vessels from the aortic arch or at the origin of the vertebral arteries from the subclavian arteries. There are calcifications at the carotid bifurcations bilaterally.  There is no significant stenosis at the right carotid bifurcation by NASCET criteria.  The left carotid bifurcation is identified  at the C3-4 level with approximately 60% stenosis at the distal carotid bulb by NASCET criteria. Evaluation of the vertebral arteries demonstrates the left to be dominant with mild narrowing intracranially with atherosclerotic calcification.  The right vertebral artery demonstrates marked narrowing at the C1 level and becomes critically narrowed versus occluded as it enters the foramen magnum. Evaluation of the visualized portions of the [id number] demonstrates calcifications with mild narrowing of the cavernous ICA bilaterally.  No major branch stenosis of the anterior circulation is identified.  There is mild stenosis however of the proximal right PCA. No evidence of acute cervical fracture. No soft tissue mass is identified in the neck.  A mildly enlarged 16 mm left supraclavicular lymph node is identified in the region of Virchow's  node, nonspecific.     No significant stenosis at the right carotid bifurcation by NASCET criteria.  Approximately 60% stenosis at the left carotid bifurcation. The left vertebral artery is dominant with mild narrowing intracranially.  The right vertebral artery is narrowed and irregular at the C1 level and is critically narrowed/occluded as it enters the foramen magnum.  This may be atherosclerotic unless there is clinical suspicion of underlying dissection, in light of the history of reported neck trauma. Limited intracranial valuation demonstrates mild narrowing of the right posterior cerebral artery. A mildly enlarged 16 mm left supraclavicular lymph node is identified in the region of Virchow's node, nonspecific. Electronically signed by: Steven Israel Date:    10/08/2022 Time:    17:17    CTA Chest Non-Coronary (PE Studies)    Result Date: 9/23/2022  EXAMINATION: CTA CHEST NON CORONARY (PE STUDIES) CLINICAL HISTORY: Pulmonary embolism (PE) suspected, high prob; TECHNIQUE: Low dose axial images, sagittal and coronal reformations were obtained from the thoracic inlet to  the lung bases following the IV administration of 100 mL of Omnipaque 350.  Contrast timing was optimized to evaluate the pulmonary arteries.  MIP images were performed. COMPARISON: None FINDINGS: Pulmonary arteries:Pulmonary arteries are adequately enhanced.No filling defects to indicate pulmonary embolism. Thoracic soft tissues: Unremarkable. Aorta: Left-sided aortic arch.  No aneurysm or dissection. Heart: Normal size. No effusion. Stephanie/Mediastinum: Mild mediastinal adenopathy the paratracheal region, AP window and subcarinal regions.  Mild bilateral hilar adenopathy. Bilateral mild axillary adenopathy. Airways: Patent. Lungs/Pleura: Clear lungs. No pleural effusion or thickening.  Few small scattered emphysematous blebs. Esophagus: Unremarkable. Upper Abdomen: The spleen is enlarged measuring greater than 18 cm.  The liver is likely enlarged though incompletely visualized. Mild upper mesenteric adenopathy Bones: No acute fracture. No suspicious lytic or sclerotic lesions.     1. No evidence of pulmonary embolism 2. Mild nonspecific adenopathy involving the mediastinum, bilateral hilum, bilateral axilla and upper abdomen.  Lymphoma or metastatic disease would be a consideration.  Follow-up recommended. 3. Hepatosplenomegaly. 4.  This report was flagged in Epic as abnormal. Electronically signed by: Tobin Stevens Date:    09/23/2022 Time:    17:43    MRA Brain without contrast    Result Date: 10/7/2022  EXAMINATION: MRA BRAIN WITHOUT CONTRAST CLINICAL HISTORY: ams; TECHNIQUE: 3D time-of-flight noncontrast MR angiogram of the intracranial vasculature with multiple MIP reconstructions. Examination significant degraded by patient motion, despite a repeat acquisition. COMPARISON: None FINDINGS: The visualized internal carotid arteries are normal in course and caliber. Basilar artery normal in caliber. The proximal ACAs, MCAs and PCAs appear within normal limits. No high-grade stenosis, large vessel occlusion, or  intracranial aneurysm identified.     Motion degraded examination, without compelling evidence of high-grade stenosis or large vessel occlusion. Electronically signed by: Terrence Longoria MD Date:    10/07/2022 Time:    13:15    MRI Brain Without Contrast    Result Date: 10/5/2022  EXAMINATION: MRI BRAIN WITHOUT CONTRAST CLINICAL HISTORY: AMS; TECHNIQUE: Multiplanar multisequence MR imaging of the brain was performed without intravenous contrast. COMPARISON: CT head from earlier the same date. FINDINGS: There is no evidence of restricted diffusion to suggest an acute infarction. Ventricles are normal in size for age without evidence of hydrocephalus.  There is left frontoparietal encephalomalacia, likely related to a remote infarction.  There are calcifications along the posterior falx.  There are T2/FLAIR hyperintensities in the supratentorial white matter, compatible with mild chronic microvascular ischemic changes.  No mass, hemorrhage, or recent or remote major vascular distribution infarct.  No extra-axial blood or fluid collections. Normal vascular flow voids. Bone marrow signal intensity is normal. Paranasal sinuses and mastoid air cells are clear.     No acute intracranial abnormality. Left frontoparietal encephalomalacia. Electronically signed by: Mayank Ontiveros Date:    10/05/2022 Time:    18:42    Echo    Result Date: 9/25/2022  · Concentric hypertrophy and normal systolic function. · The estimated ejection fraction is 60%. · Normal left ventricular diastolic function. · Normal right ventricular size with normal right ventricular systolic function. · Mild left atrial enlargement. · There is mild aortic valve stenosis. · Aortic valve area is 2.05 cm2; peak velocity is 2.51 m/s; mean gradient is 15 mmHg. · Mild tricuspid regurgitation. · Intermediate central venous pressure (8 mmHg). · The estimated PA systolic pressure is 24 mmHg. · Trivial pericardial effusion.          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 Patient does have L frontoparietal encephalomalacia and that puts him at risk of seizures; however, we suspect that dehydration, anemia, UTI and  metabolic derangements could be main contributors here. For better assessment CTA head and neck were obtained, official read suggests 60% L carotid bifurcation stenosis, and R vert narrowed and irregular at the C1 and is critically  narrowed/occluded as it enters foramen magnum. Radiologist suggests clinical suspicion of underlying dissection. Given the recent fall? EEG brain showed mod-severe diffuse slowing and gen triphasic waves suggestive of toxic/infectious/metabolic derangements. No seizures or epileptogenic focus seen. Patient is oriented to self, place, and follows commands in all 4 extremities with out any lag.     10/10 Vascular neurology:  Difficult exam due to patient becoming agitated and unwilling to participate. Recommend medical management at this time. Will sign off    Recommendations:       -Fu MRI Brain W WO contrast      -No acute intervention indicated per [facility] neurology  -ASA 81mg with Plavix 75mg - if and when thrombocytopenia improves  -Atorvastatin 40mg daily for goal LDL < 70 in setting of atherosclerotic disease  -PT/OT/SLP evaluate and treat - no clear deficits noted on exam  - Outpatient follow up with vascular neurology     Will continue to follow and monitor progression of current neurologic condition.      Adama Nava DO,   [employer] Neurology PGY-1

## 2022-10-10 NOTE — ACP (ADVANCE CARE PLANNING)
Advance Care Planning     Date: 10/10/2022    Code Status  In light of the patients advanced and life limiting illness,I engaged the the family in a conversation about the patient's preferences for care  at the very end of life. The patient wishes to have a natural, peaceful death.  Along those lines, the patient does not wish to have CPR or other invasive treatments performed when his heart and/or breathing stops. I communicated to the family that a DNR order would be placed in his medical record to reflect this preference.  I spent a total of 20 minutes engaging the patient in this advance care planning discussion.       [facility]  I engaged the patient and family in a conversation about advance care planning and we specifically addressed what the goals of care would be moving forward, in light of the patient's change in clinical status, specifically refractory anemia and advanced dementia.  We did specifically address the patient's likely prognosis, which is poor.  We explored the patient's values and preferences for future care.  The patient and family endorses that what is most important right now is to focus on spending time at home, avoiding the hospital, remaining as independent as possible, symptom/pain control, and quality of life, even if it means sacrificing a little time    Accordingly, we have decided that the best plan to meet the patient's goals includes enrolling in hospice care    I did explain the role for hospice care at this stage of the patient's illness, including its ability to help the patient live with the best quality of life possible.  We will be making a hospice referral.    I spent a total of 20 minutes engaging the patient in this advance care planning discussion.            Espinoza Esquivel MD  Hospice and Palliative Medicine  Palliative Care Pager: [phone]

## 2022-10-10 NOTE — NURSING
Chairs, NP notified critical hematocrit of 19.4 and hemoglobin of 6.4. NP ordered to transfused 1 UPRBC,. NP also notified critical Platelets of 39. Will cont to monitor.

## 2022-10-10 NOTE — PLAN OF CARE
10/10/22 1055   Post-Acute Status   Post-Acute Authorization Placement   Post-Acute Placement Status Pending post-acute provider review/more information requested  (Case discussed with son at bedside. Updated to plan for SNF. Gave a first choice facility of [facility] and a facility in [place], [place]. Referrals sent. Current acceptance from [facility]. Prefers to try first choice facility.)     Received a call from son Morgan who was at the bedside with father.  VM left with admission at [facility].

## 2022-10-10 NOTE — PLAN OF CARE
"   10/10/22 1210   Post-Acute Status   Post-Acute Authorization Placement   Post-Acute Placement Status Pending state direction/certification  (Awaiting 142. Discussed with son. He declineds to proceed with hospice at this time and would like to pursue SNF placement. Agreeable to placement at [facility]. Discussed with Guzman. PENDING INTAKE FORMS. NO pre auth required for SNF.)   Hospital Resources/Appts/Education Provided Post-Acute resouces added to AVS   Discharge Delays (!) Post-Acute Set-up  (Pending intake forms to be done. DC tmrw 10/11)   Discharge Plan   Discharge Plan A Skilled Nursing Facility     Future Appointments   Date Time Provider Department Center   10/11/2022  2:00 PM Licha Franco MD [facility] IMPRI [facility]   10/13/2022 11:20 AM Fransisco Raines MD [facility] HEM ONC [facility]     BP (!) 120/58 (BP Location: Left arm, Patient Position: Lying)   Pulse 94   Temp 97.5 °F (36.4 °C) (Oral)   Resp 18   Ht 6' 1" (1.854 m)   Wt 102 kg (224 lb 13.9 oz)   SpO2 (!) 93%   BMI 29.67 kg/m²      famotidine  20 mg Oral BID    folic acid  1 mg Oral Daily    multivitamin  1 tablet Oral Daily    piperacillin-tazobactam (ZOSYN) IVPB  4.5 g Intravenous Q8H    predniSONE  40 mg Oral Daily    thiamine  100 mg Oral Daily    tuberculin  5 Units Intradermal Once    vancomycin (VANCOCIN) IVPB  1,000 mg Intravenous Q12H       "

## 2022-10-11 PROBLEM — R06.81 APNEA: Status: ACTIVE | Noted: 2022-10-11

## 2022-10-11 PROBLEM — E87.6 HYPOKALEMIA: Status: ACTIVE | Noted: 2022-10-11

## 2022-10-11 LAB
ALBUMIN SERPL BCP-MCNC: 2 G/DL (ref 3.5–5.2)
ALP SERPL-CCNC: 116 U/L (ref 55–135)
ALT SERPL W/O P-5'-P-CCNC: 47 U/L (ref 10–44)
AMMONIA PLAS-SCNC: 33 UMOL/L (ref 10–50)
ANION GAP SERPL CALC-SCNC: 7 MMOL/L (ref 8–16)
ANISOCYTOSIS BLD QL SMEAR: SLIGHT
AST SERPL-CCNC: 48 U/L (ref 10–40)
BASOPHILS # BLD AUTO: 0.01 K/UL (ref 0–0.2)
BASOPHILS NFR BLD: 0 % (ref 0–1.9)
BASOPHILS NFR BLD: 0.2 % (ref 0–1.9)
BILIRUB SERPL-MCNC: 7.5 MG/DL (ref 0.1–1)
BLD PROD TYP BPU: NORMAL
BLD PROD TYP BPU: NORMAL
BLOOD UNIT EXPIRATION DATE: NORMAL
BLOOD UNIT EXPIRATION DATE: NORMAL
BLOOD UNIT TYPE CODE: 6200
BLOOD UNIT TYPE CODE: 6200
BLOOD UNIT TYPE: NORMAL
BLOOD UNIT TYPE: NORMAL
BUN SERPL-MCNC: 28 MG/DL (ref 8–23)
CALCIUM SERPL-MCNC: 8.2 MG/DL (ref 8.7–10.5)
CHLORIDE SERPL-SCNC: 110 MMOL/L (ref 95–110)
CO2 SERPL-SCNC: 20 MMOL/L (ref 23–29)
CODING SYSTEM: NORMAL
CODING SYSTEM: NORMAL
CREAT SERPL-MCNC: 0.9 MG/DL (ref 0.5–1.4)
DACRYOCYTES BLD QL SMEAR: ABNORMAL
DIFFERENTIAL METHOD: ABNORMAL
DIFFERENTIAL METHOD: ABNORMAL
DISPENSE STATUS: NORMAL
DISPENSE STATUS: NORMAL
EOSINOPHIL # BLD AUTO: 0 K/UL (ref 0–0.5)
EOSINOPHIL NFR BLD: 0 % (ref 0–8)
EOSINOPHIL NFR BLD: 0.2 % (ref 0–8)
ERYTHROCYTE [DISTWIDTH] IN BLOOD BY AUTOMATED COUNT: 16.8 % (ref 11.5–14.5)
ERYTHROCYTE [DISTWIDTH] IN BLOOD BY AUTOMATED COUNT: 16.8 % (ref 11.5–14.5)
EST. GFR  (NO RACE VARIABLE): >60 ML/MIN/1.73 M^2
GIANT PLATELETS BLD QL SMEAR: PRESENT
GLUCOSE SERPL-MCNC: 92 MG/DL (ref 70–110)
HAPTOGLOB SERPL-MCNC: <10 MG/DL (ref 30–250)
HCO3 UR-SCNC: 20.4 MMOL/L (ref 24–28)
HCT VFR BLD AUTO: 20.5 % (ref 40–54)
HCT VFR BLD AUTO: 22.7 % (ref 40–54)
HGB BLD-MCNC: 7 G/DL (ref 14–18)
HGB BLD-MCNC: 7.9 G/DL (ref 14–18)
HYPOCHROMIA BLD QL SMEAR: ABNORMAL
IMM GRANULOCYTES # BLD AUTO: 0.03 K/UL (ref 0–0.04)
IMM GRANULOCYTES # BLD AUTO: ABNORMAL K/UL (ref 0–0.04)
IMM GRANULOCYTES NFR BLD AUTO: 0.6 % (ref 0–0.5)
IMM GRANULOCYTES NFR BLD AUTO: ABNORMAL % (ref 0–0.5)
LDH SERPL L TO P-CCNC: 300 U/L (ref 110–260)
LYMPHOCYTES # BLD AUTO: 0.6 K/UL (ref 1–4.8)
LYMPHOCYTES NFR BLD: 13.4 % (ref 18–48)
LYMPHOCYTES NFR BLD: 18 % (ref 18–48)
MAGNESIUM SERPL-MCNC: 1.8 MG/DL (ref 1.6–2.6)
MCH RBC QN AUTO: 30.4 PG (ref 27–31)
MCH RBC QN AUTO: 30.6 PG (ref 27–31)
MCHC RBC AUTO-ENTMCNC: 34.1 G/DL (ref 32–36)
MCHC RBC AUTO-ENTMCNC: 34.8 G/DL (ref 32–36)
MCV RBC AUTO: 88 FL (ref 82–98)
MCV RBC AUTO: 89 FL (ref 82–98)
MONOCYTES # BLD AUTO: 0.2 K/UL (ref 0.3–1)
MONOCYTES NFR BLD: 4 % (ref 4–15)
MONOCYTES NFR BLD: 4.7 % (ref 4–15)
NEUTROPHILS # BLD AUTO: 3.8 K/UL (ref 1.8–7.7)
NEUTROPHILS NFR BLD: 78 % (ref 38–73)
NEUTROPHILS NFR BLD: 80.9 % (ref 38–73)
NRBC BLD-RTO: 0 /100 WBC
NRBC BLD-RTO: 0 /100 WBC
NUM UNITS TRANS PACKED RBC: NORMAL
NUM UNITS TRANS PACKED RBC: NORMAL
OVALOCYTES BLD QL SMEAR: ABNORMAL
PCO2 BLDA: 26.3 MMHG (ref 35–45)
PH SMN: 7.5 [PH] (ref 7.35–7.45)
PHOSPHATE SERPL-MCNC: 2.8 MG/DL (ref 2.7–4.5)
PLATELET # BLD AUTO: 48 K/UL (ref 150–450)
PLATELET # BLD AUTO: 64 K/UL (ref 150–450)
PLATELET # BLD AUTO: ABNORMAL K/UL (ref 150–450)
PLATELET BLD QL SMEAR: ABNORMAL
PMV BLD AUTO: 10.6 FL (ref 9.2–12.9)
PMV BLD AUTO: 10.9 FL (ref 9.2–12.9)
PMV BLD AUTO: 11.3 FL (ref 9.2–12.9)
PO2 BLDA: 77 MMHG (ref 80–100)
POC BE: -3 MMOL/L
POC SATURATED O2: 97 % (ref 95–100)
POC TCO2: 21 MMOL/L (ref 23–27)
POIKILOCYTOSIS BLD QL SMEAR: SLIGHT
POTASSIUM SERPL-SCNC: 3.3 MMOL/L (ref 3.5–5.1)
PROT SERPL-MCNC: 6.6 G/DL (ref 6–8.4)
RBC # BLD AUTO: 2.3 M/UL (ref 4.6–6.2)
RBC # BLD AUTO: 2.58 M/UL (ref 4.6–6.2)
SAMPLE: ABNORMAL
SITE: ABNORMAL
SODIUM SERPL-SCNC: 136 MMOL/L (ref 136–145)
SODIUM SERPL-SCNC: 136 MMOL/L (ref 136–145)
SODIUM SERPL-SCNC: 137 MMOL/L (ref 136–145)
SODIUM SERPL-SCNC: 137 MMOL/L (ref 136–145)
SODIUM SERPL-SCNC: 138 MMOL/L (ref 136–145)
WBC # BLD AUTO: 4.33 K/UL (ref 3.9–12.7)
WBC # BLD AUTO: 4.64 K/UL (ref 3.9–12.7)

## 2022-10-11 PROCEDURE — 90833 PR PSYCHOTHERAPY W/PATIENT W/E&M, 30 MIN (ADD ON): ICD-10-PCS | Mod: ,,, | Performed by: PSYCHIATRY & NEUROLOGY

## 2022-10-11 PROCEDURE — 92526 ORAL FUNCTION THERAPY: CPT

## 2022-10-11 PROCEDURE — 25000003 PHARM REV CODE 250: Performed by: NURSE PRACTITIONER

## 2022-10-11 PROCEDURE — 80053 COMPREHEN METABOLIC PANEL: CPT | Performed by: NURSE PRACTITIONER

## 2022-10-11 PROCEDURE — 63600175 PHARM REV CODE 636 W HCPCS: Performed by: HOSPITALIST

## 2022-10-11 PROCEDURE — 93010 ELECTROCARDIOGRAM REPORT: CPT | Mod: ,,, | Performed by: INTERNAL MEDICINE

## 2022-10-11 PROCEDURE — 85049 AUTOMATED PLATELET COUNT: CPT | Performed by: HOSPITALIST

## 2022-10-11 PROCEDURE — 84295 ASSAY OF SERUM SODIUM: CPT | Performed by: INTERNAL MEDICINE

## 2022-10-11 PROCEDURE — 99223 PR INITIAL HOSPITAL CARE,LEVL III: ICD-10-PCS | Mod: ,,, | Performed by: PSYCHIATRY & NEUROLOGY

## 2022-10-11 PROCEDURE — 93005 ELECTROCARDIOGRAM TRACING: CPT

## 2022-10-11 PROCEDURE — 90833 PSYTX W PT W E/M 30 MIN: CPT | Mod: ,,, | Performed by: PSYCHIATRY & NEUROLOGY

## 2022-10-11 PROCEDURE — 99900035 HC TECH TIME PER 15 MIN (STAT)

## 2022-10-11 PROCEDURE — 85007 BL SMEAR W/DIFF WBC COUNT: CPT | Performed by: HOSPITALIST

## 2022-10-11 PROCEDURE — 82140 ASSAY OF AMMONIA: CPT | Performed by: HOSPITALIST

## 2022-10-11 PROCEDURE — 84295 ASSAY OF SERUM SODIUM: CPT | Mod: 91 | Performed by: INTERNAL MEDICINE

## 2022-10-11 PROCEDURE — 85027 COMPLETE CBC AUTOMATED: CPT | Performed by: HOSPITALIST

## 2022-10-11 PROCEDURE — 11000001 HC ACUTE MED/SURG PRIVATE ROOM

## 2022-10-11 PROCEDURE — 97530 THERAPEUTIC ACTIVITIES: CPT

## 2022-10-11 PROCEDURE — 83735 ASSAY OF MAGNESIUM: CPT | Performed by: NURSE PRACTITIONER

## 2022-10-11 PROCEDURE — 36415 COLL VENOUS BLD VENIPUNCTURE: CPT | Performed by: HOSPITALIST

## 2022-10-11 PROCEDURE — 97530 THERAPEUTIC ACTIVITIES: CPT | Mod: CO

## 2022-10-11 PROCEDURE — 84100 ASSAY OF PHOSPHORUS: CPT | Performed by: NURSE PRACTITIONER

## 2022-10-11 PROCEDURE — 93010 EKG 12-LEAD: ICD-10-PCS | Mod: ,,, | Performed by: INTERNAL MEDICINE

## 2022-10-11 PROCEDURE — P9016 RBC LEUKOCYTES REDUCED: HCPCS | Performed by: NURSE PRACTITIONER

## 2022-10-11 PROCEDURE — 83615 LACTATE (LD) (LDH) ENZYME: CPT | Performed by: INTERNAL MEDICINE

## 2022-10-11 PROCEDURE — 36415 COLL VENOUS BLD VENIPUNCTURE: CPT | Performed by: INTERNAL MEDICINE

## 2022-10-11 PROCEDURE — 83010 ASSAY OF HAPTOGLOBIN QUANT: CPT | Performed by: INTERNAL MEDICINE

## 2022-10-11 PROCEDURE — 25000003 PHARM REV CODE 250: Performed by: INTERNAL MEDICINE

## 2022-10-11 PROCEDURE — 99223 1ST HOSP IP/OBS HIGH 75: CPT | Mod: ,,, | Performed by: PSYCHIATRY & NEUROLOGY

## 2022-10-11 PROCEDURE — 85025 COMPLETE CBC W/AUTO DIFF WBC: CPT | Performed by: NURSE PRACTITIONER

## 2022-10-11 PROCEDURE — 63600175 PHARM REV CODE 636 W HCPCS: Performed by: NURSE PRACTITIONER

## 2022-10-11 RX ORDER — HYDROCODONE BITARTRATE AND ACETAMINOPHEN 500; 5 MG/1; MG/1
TABLET ORAL
Status: DISCONTINUED | OUTPATIENT
Start: 2022-10-11 | End: 2022-10-12 | Stop reason: HOSPADM

## 2022-10-11 RX ORDER — FOLIC ACID 1 MG/1
1 TABLET ORAL DAILY
Qty: 30 TABLET | Refills: 5 | Status: SHIPPED | OUTPATIENT
Start: 2022-10-12 | End: 2023-10-12

## 2022-10-11 RX ORDER — LANOLIN ALCOHOL/MO/W.PET/CERES
100 CREAM (GRAM) TOPICAL DAILY
Qty: 30 TABLET | Refills: 5 | Status: SHIPPED | OUTPATIENT
Start: 2022-10-12

## 2022-10-11 RX ORDER — POLYETHYLENE GLYCOL 3350 17 G/17G
17 POWDER, FOR SOLUTION ORAL 3 TIMES DAILY PRN
Qty: 510 G | Refills: 0 | Status: SHIPPED | OUTPATIENT
Start: 2022-10-11

## 2022-10-11 RX ORDER — BALSAM PERU/CASTOR OIL
OINTMENT (GRAM) TOPICAL 2 TIMES DAILY
Status: DISCONTINUED | OUTPATIENT
Start: 2022-10-11 | End: 2022-10-12 | Stop reason: HOSPADM

## 2022-10-11 RX ORDER — POTASSIUM CHLORIDE 20 MEQ/1
40 TABLET, EXTENDED RELEASE ORAL ONCE
Status: COMPLETED | OUTPATIENT
Start: 2022-10-11 | End: 2022-10-11

## 2022-10-11 RX ORDER — PREDNISONE 20 MG/1
TABLET ORAL
Qty: 35 TABLET | Refills: 0 | Status: SHIPPED | OUTPATIENT
Start: 2022-10-12 | End: 2022-11-09

## 2022-10-11 RX ORDER — HALOPERIDOL 5 MG/ML
5 INJECTION INTRAMUSCULAR ONCE
Status: COMPLETED | OUTPATIENT
Start: 2022-10-11 | End: 2022-10-11

## 2022-10-11 RX ORDER — FAMOTIDINE 20 MG/1
20 TABLET, FILM COATED ORAL 2 TIMES DAILY
Qty: 60 TABLET | Refills: 11 | Status: SHIPPED | OUTPATIENT
Start: 2022-10-11 | End: 2023-10-11

## 2022-10-11 RX ADMIN — Medication 100 MG: at 08:10

## 2022-10-11 RX ADMIN — VANCOMYCIN HYDROCHLORIDE 1000 MG: 1 INJECTION, POWDER, LYOPHILIZED, FOR SOLUTION INTRAVENOUS at 09:10

## 2022-10-11 RX ADMIN — THIAMINE HYDROCHLORIDE 250 MG: 100 INJECTION, SOLUTION INTRAMUSCULAR; INTRAVENOUS at 09:10

## 2022-10-11 RX ADMIN — PIPERACILLIN AND TAZOBACTAM 4.5 G: 4; .5 INJECTION, POWDER, LYOPHILIZED, FOR SOLUTION INTRAVENOUS; PARENTERAL at 01:10

## 2022-10-11 RX ADMIN — POTASSIUM CHLORIDE 40 MEQ: 1500 TABLET, EXTENDED RELEASE ORAL at 10:10

## 2022-10-11 RX ADMIN — FAMOTIDINE 20 MG: 20 TABLET ORAL at 08:10

## 2022-10-11 RX ADMIN — MELATONIN TAB 3 MG 6 MG: 3 TAB at 10:10

## 2022-10-11 RX ADMIN — FAMOTIDINE 20 MG: 20 TABLET ORAL at 09:10

## 2022-10-11 RX ADMIN — PREDNISONE 40 MG: 20 TABLET ORAL at 08:10

## 2022-10-11 RX ADMIN — THERA TABS 1 TABLET: TAB at 08:10

## 2022-10-11 RX ADMIN — FOLIC ACID 1 MG: 1 TABLET ORAL at 08:10

## 2022-10-11 RX ADMIN — HALOPERIDOL LACTATE 5 MG: 5 INJECTION, SOLUTION INTRAMUSCULAR at 06:10

## 2022-10-11 NOTE — ASSESSMENT & PLAN NOTE
He denies eating and drinking well and has not been taking care of himself. Not sure of the validity of this. Family reports ETOH abuse 6 months ago.  MRI w/o acute infarct, but shows Left frontoparietal encephalomalacia. . Neuro checks and neuro c/s  UDS (negative) and etoh level (negative), PETH (negative)  PT/OT/ST/SW  -neurology on board   - EEG to assess for nonepileptic seizure activity:  moderate/severe degree of diffuse slowing and generalized triphasic waves.  These findings are consistent with a diffuse disturbance of cerebral function or encephalopathy secondary to metabolic, toxic, infectious, inflammatory, or other systemic processes. No potentially epileptogenic discharges or seizure activity are present during the recording.   -ammonia (WNL) RPR (nonreactive), HIV (nonreactive), ABG (mildly hypoxic, added supplemental O2)   -CIWA q 4 hours with PRN ativan if he is withdrawing from ETOH - stopped   -UA with rare bacteria- started IV rocephin, abx escalated with fevers, now no fevers since 10/8  - stop abx, no source of infection found   -MRA head: Motion degraded examination, without compelling evidence of high-grade stenosis or large vessel occlusion.   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 discussed with  Vascular neurology who does not recommend  Intervention due to no acute stroke and would medically manage. Cannot give antiplatelets at this time due to pancytopenia.   -will discuss LP with neurology - neuro recs: no LP at this time, official consult to vascular neurology, MRI brain w w/o to assess for meningeal enhancements:     Limited examination due to motion artifact.  No acute intracranial abnormality or evidence of meningeal enhancement.  -mental status mildly improved 10/8, slightly worse 10/9, AAOx3 10/10, llethargic 10/11 however given haldol   -febrile overnight 10/8, redraw blood cx (NG), urine cx (NG),   CXR:Heart size is similar to previous  exam, not enlarged.  There is calcification along the wall of the aorta.  No large volume of pleural fluid is present on this single view.  There are degenerative changes in the spine.  Lung fields are somewhat underinflated, with no focal consolidation present.    -escalated abx to vancomycin, zosyn. - stopped

## 2022-10-11 NOTE — ASSESSMENT & PLAN NOTE
Pt appeared disheveled at POC he's reported to be living in a hotel room and likely is abusing etoh.   May benefit from placement. SW for DC planning    -patient's nephew would like him to be in a nursing home in [place] with Hospice.   -Patient is now DNR.   10/10 Son Morgan, Nephew Filipe at bedside. Given patient clinical status, plan of care, they voiced understanding, all questions answered. Family involved in decision making.     10/11 CM attempting SNF, hospice or IP hospice - is accepted at [facility] for SNF

## 2022-10-11 NOTE — PLAN OF CARE
Problem: Physical Therapy  Goal: Physical Therapy Goal  Description: Goals to be met by: 22     Patient will increase functional independence with mobility by performin. Supine to sit with Moderate Assistance  2. Sit to supine with Moderate Assistance  3. Sit to stand transfer with Moderate Assistance  4. Bed to chair transfer with Moderate Assistance using least restrictive assistive device  5. Gait  x 25 feet with Moderate Assistance using least restrictive assistive device.     Outcome: Ongoing, Progressing     Pt  progressing well with therapy this date. Pt able to perform supine>sit with SBA and increased time/effort and HOB elevated. Pt performed 2 sit <>stands with RW and ModA x 2. Pt with increased WOB / labored breathing during activity and decreased standing tolerance <30 secs. Pt unable to take steps this date. Pt reporting overall weakness. Cont to recommend post acute therapy/placement.

## 2022-10-11 NOTE — ASSESSMENT & PLAN NOTE
Prominent periaortic and bilateral iliac and inguinal lymph nodes visualized.  Findings are concerning for lymphoma per CT ab/pel 9/24  Hgb 6.1, platelets 60, WBC 3.85  Anemia panel with low reticulocytes   B12, folate WNL   Stool negative for OCB 9/24  Transfuse 2 units PRBC 10/6, two MD signature needed for consent due to patient with AMS and no family contact available.   Consult Hemoc     10/8 Hgb 6.9 - 1 unit PRBC ordered   10/9 platelets 39. Will transfuse if < 20 or obvious signs of bleeding. Prednisone started per Hemoc recs. - concern for AOCD however Direct sheila +   10/10 Hgb 6.4, Platelets 63. Transfuse 1 unit PRBC   Appreciate Hemoc recs     10/11 suspected severe anemia of chronic disease. Direct antiglobulin test (10/8/22) was positive of unclear significance.  - minimize blood draws to minimize worsening of anemia  -may need bone marrow bx   Hgb 7  Platelets clumped, need to redraw   Appreciate hematology recs  - , haptoglobin pending

## 2022-10-11 NOTE — PROGRESS NOTES
Wound consult received for assessment of sacrum, heels and back wounds. Attempted assessment twice- pt eating lunch requesting re-visit and second attempt pt with Dr. Esquivel at bedside.  Will attempt assessment tomorrow.

## 2022-10-11 NOTE — PLAN OF CARE
Problem: Occupational Therapy  Goal: Occupational Therapy Goal  Description: Goals to be met by: 11/08/2022      Patient will increase functional independence with ADLs by performing:    UE Dressing with Modified [place].  LE Dressing with Modified [place].  Grooming while standing with Modified [place].  Toileting from toilet with Modified [place] for hygiene and clothing management.   Toilet transfer to toilet with Modified [place].  Increased functional strength to WF for self care.  Upper extremity exercise program x10 reps per handout, with supervision.     Outcome: Ongoing, Progressing     Patient noted with improved alertness and participation this date. Patient will benefit from continued skilled OT to address deficits and improve performance in functional ADL tasks. Cont OT.

## 2022-10-11 NOTE — PT/OT/SLP PROGRESS
Occupational Therapy   Treatment    Name: Author Rafael Lawson  MRN: [id number]  Admitting Diagnosis:  Syncope and collapse       Recommendations:     Discharge Recommendations:  (post acute therapy placement)  Discharge Equipment Recommendations:   (defer to next level of care)  Barriers to discharge:  Inaccessible home environment, Decreased caregiver support    Assessment:     Author Rafael Lawson is a 71 y.o. male with a medical diagnosis of Syncope and collapse.  Performance deficits affecting function are weakness, impaired endurance, impaired self care skills, impaired functional mobility, gait instability, impaired balance, decreased upper extremity function, decreased lower extremity function, decreased ROM, pain, impaired skin, edema, impaired cognition, visual deficits, decreased safety awareness, impaired coordination, impaired cardiopulmonary response to activity.     Rehab Prognosis:  Fair; patient would benefit from acute skilled OT services to address these deficits and reach maximum level of function.       Plan:     Patient to be seen 3 x/week to address the above listed problems via self-care/home management, therapeutic activities, therapeutic exercises  Plan of Care Expires: 11/11/22  Plan of Care Reviewed with: patient    Subjective     Pain/Comfort:  Pain Rating 1:  (mild pain in B LE with movement, especially R knee, not rated)    Objective:     Communicated with: Elder hastings prior to session.  Patient found HOB elevated with bed alarm, Condom Catheter, PICC line, pressure relief boots, telemetry upon OT entry to room.    General Precautions: Standard, fall, vision impaired   Orthopedic Precautions:N/A   Braces: N/A  Respiratory Status: Room air     Occupational Performance:     Bed Mobility:    Patient completed Scooting/Bridging with moderate assistance and with side rail  Patient completed Supine to Sit with stand by assistance, with side rail, and significantly increased time, effort and  VCs  Patient completed Sit to Supine with maximal assistance, 2 persons, and with leg lift     Functional Mobility/Transfers:  Patient completed Sit <> Stand Transfer with moderate assistance and of 2 persons  with  rolling walker   Functional Mobility: N/A    Activities of Daily Living:  Lower Body Dressing: total assistance lauren/doff socks  Toileting: condom catheter      [facility] 6 Click ADL: 10    Treatment & Education:  Patient somewhat difficult to understand, but was alert, pleasant and agreeable to therapy. Patient requesting to attempt bed mobility /s (A) - required significantly increased time, effort and VCs to transition to EOB sitting. Patient instructed in sit > stand  using RW, as above, by 2 trials. Unable to attempt steps on either stand. Attempt made to t/f to BSC, but unable to WS or step safely. Patient /c increased/labored WOB during stands; O2 and HR WNL after short seated rest break. Patient was able to complete lateral scoots along EOB /c ModA of 2 persons and max VC. MaxA of 2 to HOB via drawsheet /c bed in trendelenburg; patient able to provide some (A) /c \BLEs during scoot.     Patient left HOB elevated with all lines intact, call button in reach, bed alarm on, and nsg notified    GOALS:   Multidisciplinary Problems       Occupational Therapy Goals          Problem: Occupational Therapy    Goal Priority Disciplines Outcome Interventions   Occupational Therapy Goal     OT, PT/OT Ongoing, Progressing    Description: Goals to be met by: 11/08/2022      Patient will increase functional independence with ADLs by performing:    UE Dressing with Modified [place].  LE Dressing with Modified [place].  Grooming while standing with Modified [place].  Toileting from toilet with Modified [place] for hygiene and clothing management.   Toilet transfer to toilet with Modified [place].  Increased functional strength to WFL for self care.  Upper extremity exercise program x10 reps per  handout, with supervision.                          Time Tracking:     OT Date of Treatment: 10/11/22  OT Start Time: 1051  OT Stop Time: 1130  OT Total Time (min): 39 min Overlap with PT for portions of session due to complex nature of patient and for safety with mobility to decrease fall risk for patient and caregiver injury requiring two skilled therapists to provide different interventions.    Billable Minutes:Therapeutic Activity 39    OT/JONI: JONI     JONI Visit Number: 1    10/11/2022

## 2022-10-11 NOTE — ASSESSMENT & PLAN NOTE
Sleep apnea undiagnosed or r/t haldol   -CPAP, continuous pulse ox   -ABG, CXR     -will consult consult psych to assist with mood stabilization without sedation

## 2022-10-11 NOTE — PLAN OF CARE
[facility]     Department of Hospital Medicine     [address] [place] [address]     [phone] after hours  [phone] fax       NURSING HOME ORDERS    10/11/2022    Admit to Nursing Home:      Skilled Bed                                                Diagnoses:  Active Hospital Problems    Diagnosis  POA    *Syncope and collapse [R55]  Yes    Apnea [R06.81]  No    Hypokalemia [E87.6]  No    Occlusion and stenosis of right vertebral artery [I65.01]  Yes    Encephalopathy, metabolic [G93.41]  Yes    UTI (urinary tract infection) [N39.0]  Yes    AMS (altered mental status) [R41.82]  Yes    Intravascular volume depletion [E86.1]  Yes    FRANK (acute kidney injury) [N17.9]  Yes    Hyponatremia [E87.1]  Yes    Transaminitis [R74.01]  Yes    Discharge planning issues [Z02.9]  Not Applicable    Pancytopenia [D61.818]  Yes      Resolved Hospital Problems   No resolved problems to display.       Patient is homebound due to:  Syncope and collapse    Allergies:Review of patient's allergies indicates:  No Known Allergies    Vitals:      Every shift (Skilled Nursing patients)    Diet: Regular      Supplement:  1 can every three times a day with meals                         Type:     Ensure           Acitivities:        - Up in a chair each morning as tolerated   - Ambulate with assistance to bathroom   - Scheduled walks once each shift (every 8 hours)    - May use walker, cane, or self-propelled wheelchair      LABS:  Per facility protocol   CMP, CBC each month for 3 months    TSH every year       Nursing Precautions:     - Aspiration precautions:             - Total assistance with meals            -  Upright 90 degrees befor during and after meals             -  Suction at bedside          - Fall precautions per nursing home protocol   - Seizure precaution per long-term protocol   - Decubitus precautions:        -  for positioning   - Pressure reducing foam  mattress   - Turn patient every two hours. Use wedge pillows to anchor patient    CONSULTS:        Physical Therapy to evaluate and treat     Occupational Therapy to evaluate and treat     Speech Therapy  to evaluate and treat     Nutrition to evaluate and recommend diet     Psychiatry to evaluate and follow patients for delirium    MISCELLANEOUS CARE:            Simmons Care: Empty Simmons bag every shift.  Change Simmons every month     Routine Skin for Bedridden Patients:  Apply moisture barrier cream to all    skin folds and wet areas in perineal area daily and after baths and                           all bowel movements.    Wound care consult: sacral and heel   Skin Tear - Apply foam dressing now:  Cleanse area with normal saline, reapproximate skin flap, apply a no sting barrier film to the periwound skin, apply foam dressing      Nursing to apply Betadine to back wound BID and leave open to air.    Nursing to apply Triad ointment to sacrum wound BID. May cover with foam dressing or leave open to air.    Medications: Discontinue all previous medication orders, if any. See new list below.        Medication List        START taking these medications      famotidine 20 MG tablet  Commonly known as: PEPCID  Take 1 tablet (20 mg total) by mouth 2 (two) times daily.     folic acid 1 MG tablet  Commonly known as: FOLVITE  Take 1 tablet (1 mg total) by mouth once daily.  Start taking on: October 12, 2022     multivitamin Tab  Take 1 tablet by mouth once daily.  Start taking on: October 12, 2022     polyethylene glycol 17 gram Pwpk  Commonly known as: GLYCOLAX  Take 17 g by mouth 3 (three) times daily as needed.     predniSONE 20 MG tablet  Commonly known as: DELTASONE  Take 2 tablets (40 mg total) by mouth once daily for 7 days, THEN 1.5 tablets (30 mg total) once daily for 7 days, THEN 1 tablet (20 mg total) once daily for 7 days, THEN 0.5 tablets (10 mg total) once daily for 7 days.  Start taking on: October 12, 2022      thiamine 100 MG tablet  Take 1 tablet (100 mg total) by mouth once daily.  Start taking on: October 12, 2022            STOP taking these medications      diclofenac sodium 1 % Gel  Commonly known as: VOLTAREN                    _________________________________  Sheree Palma NP  10/11/2022

## 2022-10-11 NOTE — PT/OT/SLP PROGRESS
Physical Therapy Treatment    Patient Name:  Author Rafael Lawson   MRN:  [id number]    Recommendations:     Discharge Recommendations:   (post acute placement/therapy)   Discharge Equipment Recommendations:  (Defer to next level of care)   Barriers to discharge: Inaccessible home, Decreased caregiver support, and increased assist needed, fall risk    Assessment:     Author Rafael Lawson is a 71 y.o. male admitted with a medical diagnosis of Syncope and collapse.  He presents with the following impairments/functional limitations:  weakness, impaired endurance, gait instability, impaired balance, visual deficits, impaired self care skills, impaired functional mobility, impaired cognition, decreased safety awareness, decreased lower extremity function, decreased upper extremity function, impaired sensation, decreased coordination, impaired skin, pain, edema, impaired fine motor, decreased ROM, impaired cardiopulmonary response to activity, impaired coordination .Pt  progressing well with therapy this date. Pt able to perform supine>sit with SBA and increased time/effort and HOB elevated. Pt performed 2 sit <>stands with RW and ModA x 2. Pt with increased WOB / labored breathing during activity and decreased standing tolerance <30 secs. Pt unable to take steps this date. Pt reporting overall weakness. Cont to recommend post acute therapy/placement.      Rehab Prognosis: Fair+; patient would benefit from acute skilled PT services to address these deficits and reach maximum level of function.    Recent Surgery: * No surgery found *      Plan:     During this hospitalization, patient to be seen 5 x/week to address the identified rehab impairments via gait training, therapeutic activities, therapeutic exercises, neuromuscular re-education, wheelchair management/training and progress toward the following goals:    Plan of Care Expires:  11/09/22    Subjective     Chief Complaint: Weakness  Patient/Family Comments/goals: Improve  strength / functional mobility   Pain/Comfort:  Pain Rating 1:  (mild pain in B LE with movement, especially R knee, not rated)  Pain Addressed 1: Reposition, Distraction, Cessation of Activity, Nurse notified      Objective:     Communicated with nsg prior to session.  Patient found HOB elevated with bed alarm, Condom Catheter, PICC line, pressure relief boots upon PT entry to room.     General Precautions: Standard, fall, vision impaired   Orthopedic Precautions:N/A   Braces: N/A  Respiratory Status: Room air     Functional Mobility:  Bed Mobility:     Scooting: maximal assistance and of 2 persons supine scoot HOB via draw sheet; pt assisted with flexing B knees and pt able to push minimally through LE's  Supine to Sit: stand by assistance with increased time/effort and HOB elevated  Sit to Supine: maximal assistance and of 2 persons  Transfers:     Sit to Stand:  moderate assistance x 2 persons with rolling walker X 2 trials; bed height elevated and VC's for hand placement and sequencing      AM-PAC 6 CLICK MOBILITY  Turning over in bed (including adjusting bedclothes, sheets and blankets)?: 2  Sitting down on and standing up from a chair with arms (e.g., wheelchair, bedside commode, etc.): 2  Moving from lying on back to sitting on the side of the bed?: 3  Moving to and from a bed to a chair (including a wheelchair)?: 1  Need to walk in hospital room?: 1  Climbing 3-5 steps with a railing?: 1  Basic Mobility Total Score: 10       Therapeutic Activities and Exercises:   Pt  progressing well with therapy this date.   Pt able to perform supine>sit with SBA and increased time/effort and HOB elevated.   Pt performed 2 sit <>stands with RW and ModA x 2.   Seated rest break in between stands, increased time required.  Pt with increased WOB / labored breathing during activity and decreased standing tolerance <30 secs.   Pt educated on and practiced PLB.  SpO2 97% on RA and HR in 90s post stand.  Obtained BSC to practice  t/f 2/2 pt reporting possible need to have BM. Pt unable to take steps this date for transfer - pt attempting to lift/slide B feet, however, pt unable to and needing to return seated; decreased activity tolerance and endurance noted  Pt reporting overall weakness  Pt performed seated lateral scooting along side of bed with ModA x 2 (4 scoots)  Pt assisted to supine  Pressure relief boots donned. LUE elevated.    Patient left HOB elevated with all lines intact, call button in reach, bed alarm on, and nsg notified..    GOALS:   Multidisciplinary Problems       Physical Therapy Goals          Problem: Physical Therapy    Goal Priority Disciplines Outcome Goal Variances Interventions   Physical Therapy Goal     PT, PT/OT Ongoing, Progressing     Description: Goals to be met by: 22     Patient will increase functional independence with mobility by performin. Supine to sit with Moderate Assistance  2. Sit to supine with Moderate Assistance  3. Sit to stand transfer with Moderate Assistance  4. Bed to chair transfer with Moderate Assistance using least restrictive assistive device  5. Gait  x 25 feet with Moderate Assistance using least restrictive assistive device.                          Time Tracking:     PT Received On: 10/11/22  PT Start Time: 1051     PT Stop Time: 1130  PT Total Time (min): 39 min     Billable Minutes: Therapeutic Activity 39 (cotx with OT)    Treatment Type: Treatment  PT/PTA: PT     PTA Visit Number: 0     10/11/2022

## 2022-10-11 NOTE — SUBJECTIVE & OBJECTIVE
Interval hx: lethargic this am. Sleeping. Appears apneic with sleeping. Opens eyes and groans to painful stimulus. He was given haldol this am.      ROS: unable to obtain, mental status change     Objective:     Vital Signs (Most Recent):  Temp: 97.8 °F (36.6 °C) (10/11/22 0803)  Pulse: 91 (10/11/22 0803)  Resp: 20 (10/11/22 0803)  BP: (!) 119/56 (10/11/22 0803)  SpO2: 99 % (10/11/22 0803)   Vital Signs (24h Range):  Temp:  [96.1 °F (35.6 °C)-97.8 °F (36.6 °C)] 97.8 °F (36.6 °C)  Pulse:  [] 91  Resp:  [16-20] 20  SpO2:  [93 %-99 %] 99 %  BP: (107-120)/(55-64) 119/56     Weight: 102.8 kg (226 lb 10.1 oz)  Body mass index is 29.9 kg/m².    Intake/Output Summary (Last 24 hours) at 10/11/2022 1041  Last data filed at 10/11/2022 0719  Gross per 24 hour   Intake 800 ml   Output 600 ml   Net 200 ml        Physical Exam  Constitutional:       General: He is not in acute distress.     Appearance: He is not diaphoretic.      Comments: Lethargic    HENT:      Head: Normocephalic and atraumatic.      Nose: Nose normal.      Mouth/Throat:      Pharynx: Oropharynx is clear.   Eyes:      Conjunctiva/sclera: Conjunctivae normal.      Pupils: Pupils are equal, round, and reactive to light.   Cardiovascular:      Rate and Rhythm: Normal rate and regular rhythm.      Pulses: Normal pulses.      Heart sounds: Normal heart sounds.   Pulmonary:      Effort: Pulmonary effort is normal.      Breath sounds: Normal breath sounds.   Abdominal:      General: Abdomen is flat. Bowel sounds are normal.      Palpations: Abdomen is soft.      Comments: Mild ascites    Musculoskeletal:      Cervical back: Normal range of motion and neck supple.   Skin:     General: Skin is warm and dry.   Neurological:      Mental Status: He is lethargic and confused.      GCS: GCS eye subscore is 2. GCS verbal subscore is 2. GCS motor subscore is 4.      Cranial Nerves: No dysarthria.      Sensory: No sensory deficit.      Motor: Weakness present.       Coordination: Coordination abnormal.   Psychiatric:         Behavior: Behavior is not agitated.         Cognition and Memory: Cognition is impaired.         Judgment: Judgment is not impulsive.       Significant Labs: All pertinent labs within the past 24 hours have been reviewed.    Significant Imaging: I have reviewed all pertinent imaging results/findings within the past 24 hours.
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
## 2022-10-11 NOTE — PROGRESS NOTES
Pharmacokinetic Assessment Follow Up: IV Vancomycin    Vancomycin serum concentration assessment(s):    The trough level was drawn correctly and can be used to guide therapy at this time. The measurement is within the desired definitive target range of 10 to 20 mcg/mL.    Vancomycin Regimen Plan:    Continue regimen to Vancomycin 1000 mg IV every 12 hours with next serum trough concentration measured at 0900 prior to 4th dose on 10/12    Drug levels (last 3 results):  Recent Labs   Lab Result Units 10/10/22  2113   Vancomycin-Trough ug/mL 13.9       Pharmacy will continue to follow and monitor vancomycin.    Please contact pharmacy at extension [phone] for questions regarding this assessment.    Thank you for the consult,   Martha Earl       Patient brief summary:  Author Rafael Lawson is a 71 y.o. male initiated on antimicrobial therapy with IV Vancomycin for treatment of  fever    The patient's current regimen is 1000 mg q12h    Drug Allergies:   Review of patient's allergies indicates:  No Known Allergies    Actual Body Weight:   102 kg    Renal Function:   Estimated Creatinine Clearance: 75 mL/min (based on SCr of 1.1 mg/dL).,     Dialysis Method (if applicable):  N/A    CBC (last 72 hours):  Recent Labs   Lab Result Units 10/08/22  0635 10/09/22  0505 10/10/22  0535   WBC K/uL 4.72 5.17 3.71*   Hemoglobin g/dL 6.9* 7.4* 6.4*   Hematocrit % 20.2* 21.8* 19.4*   Platelets K/uL 50* 39* 63*   Gran % % 72.5 85.7* 83.0*   Lymph % % 19.5 7.7* 11.0*   Mono % % 6.1 4.1 6.0   Eosinophil % % 0.2 0.8 0.0   Basophil % % 0.0 0.2 0.0   Differential Method  Automated Automated Automated       Metabolic Panel (last 72 hours):  Recent Labs   Lab Result Units 10/08/22  0026 10/08/22  0635 10/08/22  1147 10/08/22  2353 10/09/22  0505 10/09/22  1248 10/09/22  1249 10/09/22  1431 10/09/22  1830 10/09/22  2341 10/10/22  0535 10/10/22  1246 10/10/22  1922   Sodium mmol/L 146* 149*  149* 149* 149* 152* 153* 153*  --  151* 151* 149*   149* 143 137   Potassium mmol/L  --  3.6  --   --   --   --  3.6  --   --   --  3.6  --   --    Chloride mmol/L  --  118*  --   --   --   --  122*  --   --   --  119*  --   --    CO2 mmol/L  --  22*  --   --   --   --  23  --   --   --  22*  --   --    Glucose mg/dL  --  120*  --   --   --   --  119*  --   --   --  152*  --   --    Glucose, UA   --   --   --   --   --   --   --  Negative  --   --   --   --   --    BUN mg/dL  --  35*  --   --   --   --  27*  --   --   --  28*  --   --    Creatinine mg/dL  --  1.2  --   --   --   --  1.1  --   --   --  1.1  --   --    Albumin g/dL  --  2.2*  --   --   --   --  2.2*  --   --   --  2.0*  --   --    Total Bilirubin mg/dL  --  4.2*  --   --   --   --  7.2*  --   --   --  5.6*  --   --    Alkaline Phosphatase U/L  --  106  --   --   --   --  108  --   --   --  101  --   --    AST U/L  --  28  --   --   --   --  27  --   --   --  31  --   --    ALT U/L  --  30  --   --   --   --  32  --   --   --  34  --   --    Magnesium mg/dL  --  2.4  --   --   --   --   --   --   --   --  2.1  --   --    Phosphorus mg/dL  --  3.7  --   --   --   --   --   --   --   --  3.3  --   --        Vancomycin Administrations:  vancomycin given in the last 96 hours                     vancomycin in dextrose 5 % 1 gram/250 mL IVPB 1,000 mg (mg) 1,000 mg New Bag 10/10/22 1249     1,000 mg New Bag 10/09/22 2680    vancomycin (VANCOCIN) 2,250 mg in dextrose 5 % 500 mL IVPB (mg) 2,250 mg New Bag 10/09/22 1420                    Microbiologic Results:  Microbiology Results (last 7 days)       Procedure Component Value Units Date/Time    Blood culture [[id number]] Collected: 10/09/22 1248    Order Status: Completed Specimen: Blood Updated: 10/10/22 1812     Blood Culture, Routine No Growth to date      No Growth to date    Blood culture [[id number]] Collected: 10/09/22 1248    Order Status: Completed Specimen: Blood from Antecubital, Right Arm Updated: 10/10/22 1812     Blood Culture, Routine No Growth  to date      No Growth to date    Blood culture [[id number]] Collected: 10/07/22 1849    Order Status: Completed Specimen: Blood Updated: 10/10/22 0612     Blood Culture, Routine No Growth to date      No Growth to date      No Growth to date    Blood culture [[id number]] Collected: 10/07/22 1849    Order Status: Completed Specimen: Blood Updated: 10/10/22 0612     Blood Culture, Routine No Growth to date      No Growth to date      No Growth to date    Urine Culture High Risk [[id number]]     Order Status: No result Specimen: Urine     Influenza A & B by Molecular [[id number]] Collected: 10/05/22 1313    Order Status: Completed Specimen: Nasopharyngeal Swab Updated: 10/05/22 1412     Influenza A, Molecular Negative     Influenza B, Molecular Negative     Flu A & B Source Nasal swab

## 2022-10-11 NOTE — PROGRESS NOTES
[facility] Progress Note    Author Rafael Lawson  1950  [id number]      Subjective:   Patient is a 71 y.o. male who was admitted on 10/5/2022 for syncope and encephalopathy.    Today the patient states that he is feeling better today. Requesting to sit up out of bed however LE strength 1/5. Repeat MRI W WO contrast showed not acute intracranial abnormality or evidence of meningeal enhancement. Was found early this am attempting to get out of bed, pulling IV lines stating he wanted to go home. Haldol 5 mg IV given.     ROS: speech problems, Generalized and LE weakness    Objective:  Vitals:    10/11/22 0803   BP: (!) 119/56   Pulse: 91   Resp: 20   Temp: 97.8 °F (36.6 °C)     Scheduled Meds:   famotidine  20 mg Oral BID    folic acid  1 mg Oral Daily    multivitamin  1 tablet Oral Daily    piperacillin-tazobactam (ZOSYN) IVPB  4.5 g Intravenous Q8H    potassium chloride  40 mEq Oral Once    predniSONE  40 mg Oral Daily    thiamine  100 mg Oral Daily    vancomycin (VANCOCIN) IVPB  1,000 mg Intravenous Q12H       Neurologic Physical Examination:   Orientation  Somnolent but arousable, oriented to name, place, month. States he drove here (found down)  Language  Dysarthric but improved from yesterday.   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  Motor  5/5 strength in UE  2/5 strength in LE  Positive for BLE 2-beat unsustained clonus of bilateral ankles  Negative Dodge sign bilaterally  Sensory  Normal to light touch throughout  Cerebellar/Gait  Normal finger to nose   Heel-to-shin could not be assessed    Laboratory Findings:   Reviewed    Neuroimaging:   CT Head Without Contrast    Result Date: 10/5/2022  EXAMINATION: CT HEAD WITHOUT CONTRAST CLINICAL HISTORY: Head trauma, minor (Age >= 65y); TECHNIQUE: Low dose axial images were obtained through the  head.  Coronal and sagittal reformations were also performed. Contrast was not administered. COMPARISON: None. FINDINGS: Blood: No acute intracranial hemorrhage. Parenchyma: No definite loss of gray-white differentiation to suggest acute or subacute transcortical infarct. Left frontal and parietal lobe encephalomalacia and gliosis.  Elsewhere, generalized pattern age-related volume loss.  Additional nonspecific areas of white matter hypoattenuation could relate to sequela of chronic small vessel ischemic disease. Ventricles/Extra-axial spaces: No abnormal extra-axial fluid collection. Basal cisterns are patent. Vessels: Atherosclerosis Orbits: Grossly unremarkable. Scalp: Question left posterolateral and right posterior scalp soft tissue contusions. Skull: There are no depressed skull fractures or destructive bone lesions. Sinuses and mastoids: Scattered relatively minor paranasal sinus mucosal thickening retention cysts. Other findings: None     1. No acute intracranial findings. 2. Left frontoparietal encephalomalacia gliosis for which clinical correlation recommended. Electronically signed by: Aniceto Bob Date:    10/05/2022 Time:    14:39    CTA Neck    Result Date: 10/8/2022  EXAMINATION: CTA NECK CLINICAL HISTORY: Ataxia, cervical trauma;Ams; TECHNIQUE: CT angiogram was performed from the level of the dat to the EAC following the IV administration of 100mL of Omnipaque 350.   Sagittal and coronal reconstructions and maximum intensity projection reconstructions were performed. Arterial stenosis percentages are based on NASCET measurement criteria. COMPARISON: MRI brain 10/05/2022 MRA head 10/07/2022 FINDINGS: There is no significant stenosis at the origin of the vessels from the aortic arch or at the origin of the vertebral arteries from the subclavian arteries. There are calcifications at the carotid bifurcations bilaterally.  There is no significant stenosis at the right carotid bifurcation by NASCET  criteria.  The left carotid bifurcation is identified at the C3-4 level with approximately 60% stenosis at the distal carotid bulb by NASCET criteria. Evaluation of the vertebral arteries demonstrates the left to be dominant with mild narrowing intracranially with atherosclerotic calcification.  The right vertebral artery demonstrates marked narrowing at the C1 level and becomes critically narrowed versus occluded as it enters the foramen magnum. Evaluation of the visualized portions of the vralzx-is-Gmzhar demonstrates calcifications with mild narrowing of the cavernous ICA bilaterally.  No major branch stenosis of the anterior circulation is identified.  There is mild stenosis however of the proximal right PCA. No evidence of acute cervical fracture. No soft tissue mass is identified in the neck.  A mildly enlarged 16 mm left supraclavicular lymph node is identified in the region of Virchow's  node, nonspecific.     No significant stenosis at the right carotid bifurcation by NASCET criteria.  Approximately 60% stenosis at the left carotid bifurcation. The left vertebral artery is dominant with mild narrowing intracranially.  The right vertebral artery is narrowed and irregular at the C1 level and is critically narrowed/occluded as it enters the foramen magnum.  This may be atherosclerotic unless there is clinical suspicion of underlying dissection, in light of the history of reported neck trauma. Limited intracranial valuation demonstrates mild narrowing of the right posterior cerebral artery. A mildly enlarged 16 mm left supraclavicular lymph node is identified in the region of Virchow's node, nonspecific. Electronically signed by: Steven Israel Date:    10/08/2022 Time:    17:17    CTA Chest Non-Coronary (PE Studies)    Result Date: 9/23/2022  EXAMINATION: CTA CHEST NON CORONARY (PE STUDIES) CLINICAL HISTORY: Pulmonary embolism (PE) suspected, high prob; TECHNIQUE: Low dose axial images, sagittal and coronal  reformations were obtained from the thoracic inlet to the lung bases following the IV administration of 100 mL of Omnipaque 350.  Contrast timing was optimized to evaluate the pulmonary arteries.  MIP images were performed. COMPARISON: None FINDINGS: Pulmonary arteries:Pulmonary arteries are adequately enhanced.No filling defects to indicate pulmonary embolism. Thoracic soft tissues: Unremarkable. Aorta: Left-sided aortic arch.  No aneurysm or dissection. Heart: Normal size. No effusion. Stephanie/Mediastinum: Mild mediastinal adenopathy the paratracheal region, AP window and subcarinal regions.  Mild bilateral hilar adenopathy. Bilateral mild axillary adenopathy. Airways: Patent. Lungs/Pleura: Clear lungs. No pleural effusion or thickening.  Few small scattered emphysematous blebs. Esophagus: Unremarkable. Upper Abdomen: The spleen is enlarged measuring greater than 18 cm.  The liver is likely enlarged though incompletely visualized. Mild upper mesenteric adenopathy Bones: No acute fracture. No suspicious lytic or sclerotic lesions.     1. No evidence of pulmonary embolism 2. Mild nonspecific adenopathy involving the mediastinum, bilateral hilum, bilateral axilla and upper abdomen.  Lymphoma or metastatic disease would be a consideration.  Follow-up recommended. 3. Hepatosplenomegaly. 4.  This report was flagged in Epic as abnormal. Electronically signed by: Tobin Stevens Date:    09/23/2022 Time:    17:43    MRA Brain without contrast    Result Date: 10/7/2022  EXAMINATION: MRA BRAIN WITHOUT CONTRAST CLINICAL HISTORY: ams; TECHNIQUE: 3D time-of-flight noncontrast MR angiogram of the intracranial vasculature with multiple MIP reconstructions. Examination significant degraded by patient motion, despite a repeat acquisition. COMPARISON: None FINDINGS: The visualized internal carotid arteries are normal in course and caliber. Basilar artery normal in caliber. The proximal ACAs, MCAs and PCAs appear within normal limits.  No high-grade stenosis, large vessel occlusion, or intracranial aneurysm identified.     Motion degraded examination, without compelling evidence of high-grade stenosis or large vessel occlusion. Electronically signed by: Terrence Longoria MD Date:    10/07/2022 Time:    13:15    MRI Brain Without Contrast    Result Date: 10/5/2022  EXAMINATION: MRI BRAIN WITHOUT CONTRAST CLINICAL HISTORY: AMS; TECHNIQUE: Multiplanar multisequence MR imaging of the brain was performed without intravenous contrast. COMPARISON: CT head from earlier the same date. FINDINGS: There is no evidence of restricted diffusion to suggest an acute infarction. Ventricles are normal in size for age without evidence of hydrocephalus.  There is left frontoparietal encephalomalacia, likely related to a remote infarction.  There are calcifications along the posterior falx.  There are T2/FLAIR hyperintensities in the supratentorial white matter, compatible with mild chronic microvascular ischemic changes.  No mass, hemorrhage, or recent or remote major vascular distribution infarct.  No extra-axial blood or fluid collections. Normal vascular flow voids. Bone marrow signal intensity is normal. Paranasal sinuses and mastoid air cells are clear.     No acute intracranial abnormality. Left frontoparietal encephalomalacia. Electronically signed by: Mayank Ontiveros Date:    10/05/2022 Time:    18:42    MRI Brain W WO Contrast    Result Date: 10/10/2022  EXAMINATION: MRI BRAIN W WO CONTRAST CLINICAL HISTORY: please look for meningeal enhancements; TECHNIQUE: Multiplanar, multisequence MR imaging of the brain was performed before and after the administration of 10 mL Gadavist intravenous contrast. COMPARISON: MRI brain from 10/05/2022.  CTA neck from 10/08/2022. FINDINGS: Examination is limited by motion artifact. There is no evidence of restricted diffusion to suggest an acute infarction. There is brain parenchymal volume loss and prominence of the CSF spaces.  No  hydrocephalus.  There is encephalomalacia in the left frontal parietal lobes, compatible with a remote infarction.  No mass, hemorrhage, or recent or remote major vascular distribution infarct.  No extra-axial blood or fluid collections. Normal vascular flow voids. There is no abnormal pachymeningeal or leptomeningeal enhancement. Bone marrow signal intensity is normal. Paranasal sinuses and mastoid air cells are clear.     Limited examination due to motion artifact.  No acute intracranial abnormality or evidence of meningeal enhancement. Electronically signed by: Mayank Ontiveros Date:    10/10/2022 Time:    21:02    Echo    Result Date: 9/25/2022  · Concentric hypertrophy and normal systolic function. · The estimated ejection fraction is 60%. · Normal left ventricular diastolic function. · Normal right ventricular size with normal right ventricular systolic function. · Mild left atrial enlargement. · There is mild aortic valve stenosis. · Aortic valve area is 2.05 cm2; peak velocity is 2.51 m/s; mean gradient is 15 mmHg. · Mild tricuspid regurgitation. · Intermediate central venous pressure (8 mmHg). · The estimated PA systolic pressure is 24 mmHg. · Trivial pericardial effusion.        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 Patient does have L frontoparietal encephalomalacia and that puts him at risk of seizures; however, we suspect that dehydration, anemia, UTI and  metabolic derangements could be main contributors here. For better assessment CTA head and neck were obtained, official read suggests 60% L carotid bifurcation stenosis, and R vert narrowed and irregular at the C1 and is critically  narrowed/occluded as it enters  foramen magnum. Radiologist suggests clinical suspicion of underlying dissection. Given the recent fall? EEG brain showed mod-severe diffuse slowing and gen triphasic waves suggestive of toxic/infectious/metabolic derangements. No seizures or epileptogenic focus seen. Patient is oriented to self, place, and follows commands in all 4 extremities with out any lag. Repeat MRI W & WO contrast showed not acute intracranial abnormality or evidence of meningeal enhancement.   10/10 Vascular neurology:  Recommend medical management at this time. Will sign off.     CVA unlikely at this time. Symptoms likely related to pancytopenia, metabolic abnormalities, and severe illness. However, we do have concern about his BLE weakness with 2-beat unsustained clonus of bilateral ankles with bladder and stool incontinence for sometime per patient. Unsure how long the patient has been experiencing this LE weakness.        Recommendations:       - Recommend MRI of cervical, thoracic, and lumbar spine to assess for possible infectious, traumatic or malignancy-induced causes of LE weakness and bowel, bladder dysfunction  -ASA 81mg with Plavix 75mg - if and when thrombocytopenia improves  -Atorvastatin 40mg daily for goal LDL < 70 in setting of atherosclerotic disease  -PT/OT/SLP evaluate and treat - no clear deficits noted on exam  - Consider outpatient follow up with vascular surgery to address stenosis/dissection of Right vertebral artery     Will continue to follow and monitor progression of current neurologic condition.         Adama Nava DO   [facility] PGY-1

## 2022-10-11 NOTE — ASSESSMENT & PLAN NOTE
?beer potomania. Appears chronic do not suspect it's c/t his AMS  Mey and Osm  Serial Na while giving isotonic saline in case this is siadh which I highly doubt    Na now 141     10/9 Na 151- start d10 gtt at 50 ml/hr, recheck na in 4 hours   10/10 fluids transitioned to D5 at 100 ml/hr yesterday evening, Na improving 149, continue to correct and monitor   10/11 Na 137 dextrose stopped

## 2022-10-11 NOTE — PLAN OF CARE
10/11/22 1037   Post-Acute Status   Post-Acute Authorization Placement   Post-Acute Placement Status Pending medical clearance/testing     Pt not medically ready for DC today. Updated facility and son.

## 2022-10-11 NOTE — CONSULTS
PSYCHIATRY INPATIENT CONSULT NOTE      10/11/2022 3:02 PM   Author Rafael Machado.   1950   [id number]           DATE OF ADMISSION: 10/5/2022 12:28 PM    SITE: [facility]    CURRENT LEGAL STATUS: Patient does not currently meet PEC criteria due to not currently being an imminent threat to self/others and not being gravely disabled 2/2 mental illness at this time.     HISTORY    Per Initial History from Primary Team:   71-year-old male, homeless, unknown medical history, brought to the ED by EMS for altered mental status.  Patient is confused about his situation he alert and oriented to self and place.  Per nursing chart, patient was found in the hotel bathroom on the floor, covered in urine and feces.  Patient doesn't remember leading events, denied chest pain or dizziness prior to, no n/v. Says that he might have a fallen and woke up in a hotel bed then EMS brought him in here. Reports shortness of breath, bilaterally lower extremities weakness, unable to move them without assistant.  Denies chest pain, abdominal pain, nausea/vomiting, no alcohol or drug uses. XOCHILT CATES MD.   Interval History from Primary Team on 10/11/2022:  lethargic this am. Sleeping. Appears apneic with sleeping. Opens eyes and groans to painful stimulus. He was given haldol this am.       Chief Complaint / Reason for Psychiatry Consult: Behavioral Disturbances / Agitation in Delirium vs Dementia       HPI:   Author Rafael Lawson is a 71 y.o. male with a past medical history as noted above/below, and a past psychiatric history of alcohol use disorder, currently being treated by his inpatient primary team for a principle problem of syncope and collapse.  Psychiatry was originally consulted as noted above.  The patient was seen and examined.  The chart was reviewed.  On examination today, the patient was somnolent but oriented to person and type of place only.  He was disoriented to name of place, city, state, month, year, and situation.  He was  initially CAM-ICU NEGATIVE for delirium at the beginning of the assessment and then POSITIVE for delirium at end of assessment (waxing and waning) (struggled significantly with attention / concentration / focus tasks).  Patient was unwilling / unable to participate in a MOCA today despite multiple attempts.  He received PRN Haldol 5 mg IV this morning around 0625 for what appears to have been an episode of agitation / behavioral disturbances in delirium.  Patient did exhibit intermittent confusion during assessment today, limiting the validity of the comprehensive psychiatric examination.  He denies any current or prior s/s consistent with lianna, psychosis, depression, anxiety, panic, OCD, PTSD, eating disorder, or substance abuse other than a multi-year hx of heavy alcohol abuse / dependence.  Despite multiple attempts, he was unable to provide when his last drink was, but his ethanol and PETH were < 10 earlier in this admission (no concern for acute alcohol withdrawal).  He endorses sleeping 6-8 hours nightly without difficulty and endorses a good / improving appetite.  He denies any current or prior active / passive SI / HI.  He denies any AH, VH, TH, delusions, paranoia, or DIGFAST (lianna) s/s.   He denies any adverse effects to his current medication regimen.  Regarding current medical/physical complaints, he endorses fatigue / generalized weakness and memory deficits.  He denies any other medical complaints at this time.  NAD was observed during the examination.  Psychotherapy was implemented as noted below with a focus on improving orientation, confusion, and behavioral modification.  See detailed psych ROS below (of note, examination was limited due to patient's AMS).  See A/P below.         Psychiatric Review Of Systems - Currently, the patient is endorsing and/or denying the following:  (patient's endorsements are BOLDED below; if not BOLDED, then patient denied) (limited validity due to AMS):    Denies  Symptoms of Depression: diminished mood or loss of interest/anhedonia; irritability, diminished energy, change in sleep, change in appetite, diminished concentration or cognition or indecisiveness, PMA/R, excessive guilt or hopelessness or worthlessness, suicidal ideations    Denies issues with Sleep: initiation, maintenance, early morning awakening with inability to return to sleep    Denies Suicidal/Homicidal ideations: active/passive ideations, organized plans, future intentions    Denies Symptoms of psychosis: hallucinations, delusions, disorganized thinking, disorganized behavior or abnormal motor behavior, or negative symptoms (diminshed emotional expression, avolition, anhedonia, alogia, asociality     Denies Symptoms of lianna or hypomania: elevated, expansive, or irritable mood with increased energy or activity; with inflated self-esteem or grandiosity, decreased need for sleep, increased rate of speech, FOI or racing thoughts, distractibility, increased goal directed activity or PMA, risky/disinhibited behavior    Denies Symptoms of Anxiety: excessive anxiety/worry/fear, more days than not, about numerous issues, difficult to control, with restlessness, fatigue, poor concentration, irritability, muscle tension, sleep disturbance; causes functionally impairing distress     Denies Symptoms of Panic Disorder: recurrent panic attacks, precipitated or un-precipitated, source of worry and/or behavioral changes secondary; with or without agoraphobia    Denies Symptoms of PTSD: h/o trauma; re-experiencing/intrusive symptoms, avoidant behavior, negative alterations in cognition or mood, or hyperarousal symptoms; with or without dissociative symptoms     Denies Symptoms of OCD: obsessions or compulsions     Denies Symptoms of Eating Disorders: anorexia, bulimia or binging    Denies Substance Use (hx of alcohol abuse per chart review): intoxication, withdrawal, tolerance, used in larger amounts or duration than  intended, unsuccessful attempts to limit or quit, increased time engaging in or seeking out, cravings or strong desire to use, failure to fulfill obligations, negative consequences in social/interpersonal/occupational,/recreational areas, use in dangerous situations, medical or psychological consequences       [facility] Toolkit ASQ Suicide Screening Tool:  In the past few weeks, have you wished you were dead? Denies   In the past few weeks, have you felt that you or your family would be better off if you were dead? Denies  In the past week, have you been having thoughts about killing yourself? Denies  Have you ever tried to kill yourself? Denies  Are you having thoughts of killing yourself right now? Denies      PSYCHOTHERAPY ADD-ON +[id number]   30 (16-37*) minutes    Time: 20 minutes  Participants: Met with patient    Therapeutic Intervention Type: behavior modifying psychotherapy, supportive psychotherapy  Why chosen therapy is appropriate versus another modality: relevant to diagnosis, patient responds to this modality, evidence based practice    Target symptoms: disorientation, confusion, and behavioral disturbances / agitation   Primary focus: improving orientation, confusion, and behavioral modification   Psychotherapeutic techniques: supportive and psychodynamic techniques; psycho-education; reorientation; deep breathing exercises; CBT; reality / insight orientation; problem solving techniques and managing life/medical stressors    Outcome monitoring methods: self-report, observation    Patient's response to intervention:  The patient's response to intervention is accepting / limited.    Progress toward goals:  The patient's progress toward goals is fair / limited.        ROS (limited validity due to AMS):  General ROS: negative for - chills, fever or night sweats; positive for fatigue / generalized weakness   Ophthalmic ROS: negative for - blurry vision, double vision or eye pain  ENT ROS: negative for - sinus pain,  headaches, sore throat or visual changes  Allergy and Immunology ROS: negative for - hives, itchy/watery eyes or nasal congestion  Hematological and Lymphatic ROS: negative for - bleeding problems, bruising, jaundice or pallor  Endocrine ROS: negative for - galactorrhea, hot flashes, mood swings, palpitations or temperature intolerance  Respiratory ROS: negative for - cough, hemoptysis, shortness of breath, tachypnea or wheezing  Cardiovascular ROS: negative for - chest pain, dyspnea on exertion, loss of consciousness, palpitations, rapid heart rate or shortness of breath  Gastrointestinal ROS: negative for - appetite loss, nausea, abdominal pain, blood in stools, change in bowel habits, constipation or diarrhea  Genito-Urinary ROS: negative for - incontinence, nocturia or pelvic pain  Musculoskeletal ROS: negative for - joint stiffness, joint swelling, joint pain or muscle pain   Neurological ROS: negative for - dizziness, numbness/tingling or seizures; positive for behavioral changes, confusion, & memory loss  Dermatological ROS: negative for dry skin, hair changes, pruritus or rash  Psychiatric ROS: see detailed psychiatric ROS above in history section       Past Psychiatric History:  Previous Medication Trials: denies   Previous Psychiatric Hospitalizations: denies   Previous Suicide Attempts: denies   History of Violence: denies   Outpatient Psychiatrist: denies   Hx of Depression: denies   Hx of Anxiety: denies   Hx of Nanda: denies   Hx of Psychosis: denies     Social History:  Employment Status/Info: retired  Special Ed: denies   Bahai: [employer]   Housing Status: most recently in a hotel   History of phys/sexual abuse: denies  Access to gun: denies     Family Psychiatric History: denies     Substance Abuse History:  Recreational Drugs: denies ; UDS negative on admission   Use of Alcohol: hx of heavy alcohol abuse / dependence ; denies recent use ; ethanol < 10 on admission ; PETH < 10 on 10/06/22 ;  denies hx of complicated withdrawal   Rehab History: attempted but unable to assess due to AMS  Tobacco Use: attempted but unable to assess due to AMS    Legal History:  Past Charges/Incarcerations: denies   Pending charges: denies     Psychosocial Stressors: financial, health, and alcohol abuse  Functioning Relationships: fair support system  Strengths AND Liabilities:  Strength: Patient accepts guidance/feedback, Liability: Patient has poor health., Liability: Patient has neurocognitive impairment.      PAST MEDICAL & SURGICAL HISTORY   No past medical history on file.  No past surgical history on file.    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    ALLERGIES   Review of patient's allergies indicates:  No Known Allergies    CURRENT MEDICATION REGIMEN   Home Meds:   Prior to Admission medications    Medication Sig Start Date End Date Taking? Authorizing Provider   diclofenac sodium (VOLTAREN) 1 % Gel Apply 4 g topically once daily. 9/27/22 10/11/22 Yes Dayne Roman MD   famotidine (PEPCID) 20 MG tablet Take 1 tablet (20 mg total) by mouth 2 (two) times daily. 10/11/22 10/11/23  Sheree Palma NP   folic acid (FOLVITE) 1 MG tablet Take 1 tablet (1 mg total) by mouth once daily. 10/12/22 10/12/23  Sheree Palma NP   multivitamin Tab Take 1 tablet by mouth once daily. 10/12/22   Sheree Palma NP   polyethylene glycol (GLYCOLAX) 17 gram/dose powder mix 17 g ( 1 capful ) in 8 ounces of liquid and drink  by mouth 3 (three) times daily as needed. 10/11/22   Sheree Palma NP   predniSONE (DELTASONE) 20 MG tablet Take 2 tablets (40 mg total) by mouth once daily for 7 days, THEN 1.5 tablets (30 mg total) once daily for 7 days, THEN 1 tablet (20 mg total) once daily for 7 days, THEN 0.5 tablets (10 mg total) once daily for 7 days. 10/12/22 11/9/22   Sheree Palma NP   thiamine 100 MG tablet Take 1 tablet (100 mg total) by mouth once daily. 10/12/22   Sheree Palma NP       OTC Meds: tayla     Scheduled Meds:    famotidine  20 mg Oral BID    folic acid  1 mg Oral Daily    multivitamin  1 tablet Oral Daily    predniSONE  40 mg Oral Daily    thiamine  100 mg Oral Daily      PRN Meds: sodium chloride, sodium chloride, sodium chloride, sodium chloride, sodium chloride, acetaminophen, albuterol-ipratropium, bisacodyL, dextrose 10%, dextrose 10%, glucagon (human recombinant), glucose, glucose, melatonin, naloxone, ondansetron, polyethylene glycol, prochlorperazine, simethicone, sodium chloride 0.9%   Psychotherapeutics (From admission, onward)      None            LABORATORY DATA   Recent Results (from the past 72 hour(s))   Sodium    Collection Time: 10/08/22 11:53 PM   Result Value Ref Range    Sodium 149 (H) 136 - 145 mmol/L   Sodium    Collection Time: 10/09/22  5:05 AM   Result Value Ref Range    Sodium 152 (H) 136 - 145 mmol/L   CBC auto differential    Collection Time: 10/09/22  5:05 AM   Result Value Ref Range    WBC 5.17 3.90 - 12.70 K/uL    RBC 2.36 (L) 4.60 - 6.20 M/uL    Hemoglobin 7.4 (L) 14.0 - 18.0 g/dL    Hematocrit 21.8 (L) 40.0 - 54.0 %    MCV 92 82 - 98 fL    MCH 31.4 (H) 27.0 - 31.0 pg    MCHC 33.9 32.0 - 36.0 g/dL    RDW 16.8 (H) 11.5 - 14.5 %    Platelets 39 (LL) 150 - 450 K/uL    MPV 12.7 9.2 - 12.9 fL    Immature Granulocytes 1.5 (H) 0.0 - 0.5 %    Gran # (ANC) 4.4 1.8 - 7.7 K/uL    Immature Grans (Abs) 0.08 (H) 0.00 - 0.04 K/uL    Lymph # 0.4 (L) 1.0 - 4.8 K/uL    Mono # 0.2 (L) 0.3 - 1.0 K/uL    Eos # 0.0 0.0 - 0.5 K/uL    Baso # 0.01 0.00 - 0.20 K/uL    nRBC 0 0 /100 WBC    Gran % 85.7 (H) 38.0 - 73.0 %    Lymph % 7.7 (L) 18.0 - 48.0 %    Mono % 4.1 4.0 - 15.0 %    Eosinophil % 0.8 0.0 - 8.0 %    Basophil % 0.2 0.0 - 1.9 %    Platelet Estimate Decreased (A)     Aniso Slight     Hypo Occasional     Differential Method Automated     Blood culture    Collection Time: 10/09/22 12:48 PM    Specimen: Antecubital, Right Arm; Blood   Result Value Ref Range    Blood Culture, Routine No Growth to date     Blood Culture, Routine No Growth to date    Blood culture    Collection Time: 10/09/22 12:48 PM    Specimen: Blood   Result Value Ref Range    Blood Culture, Routine No Growth to date     Blood Culture, Routine No Growth to date    Sodium    Collection Time: 10/09/22 12:48 PM   Result Value Ref Range    Sodium 153 (H) 136 - 145 mmol/L   Comprehensive metabolic panel    Collection Time: 10/09/22 12:49 PM   Result Value Ref Range    Sodium 153 (H) 136 - 145 mmol/L    Potassium 3.6 3.5 - 5.1 mmol/L    Chloride 122 (H) 95 - 110 mmol/L    CO2 23 23 - 29 mmol/L    Glucose 119 (H) 70 - 110 mg/dL    BUN 27 (H) 8 - 23 mg/dL    Creatinine 1.1 0.5 - 1.4 mg/dL    Calcium 9.0 8.7 - 10.5 mg/dL    Total Protein 7.0 6.0 - 8.4 g/dL    Albumin 2.2 (L) 3.5 - 5.2 g/dL    Total Bilirubin 7.2 (H) 0.1 - 1.0 mg/dL    Alkaline Phosphatase 108 55 - 135 U/L    AST 27 10 - 40 U/L    ALT 32 10 - 44 U/L    Anion Gap 8 8 - 16 mmol/L    eGFR >60 >60 mL/min/1.73 m^2   Urinalysis, Reflex to Urine Culture Urine, Clean Catch    Collection Time: 10/09/22  2:31 PM    Specimen: Urine   Result Value Ref Range    Specimen UA Urine, Catheterized     Color, UA Yellow Yellow, Straw, Dahlia    Appearance, UA Hazy (A) Clear    pH, UA 6.0 5.0 - 8.0    Specific Gravity, UA 1.025 1.005 - 1.030    Protein, UA 1+ (A) Negative    Glucose, UA Negative Negative    Ketones, UA Negative Negative    Bilirubin (UA) 1+ (A) Negative    Occult Blood UA 1+ (A) Negative    Nitrite, UA Negative Negative    Urobilinogen, UA 4.0-6.0 (A) <2.0 EU/dL    Leukocytes, UA Negative Negative   Urinalysis Microscopic    Collection Time: 10/09/22  2:31 PM   Result Value Ref Range    RBC, UA 0 0 - 4 /hpf    WBC, UA 1 0 - 5 /hpf    Bacteria None None-Occ /hpf    Hyaline Casts, UA 0 0-1/lpf /lpf    Microscopic Comment SEE  COMMENT    Sodium    Collection Time: 10/09/22  6:30 PM   Result Value Ref Range    Sodium 151 (H) 136 - 145 mmol/L   Sodium    Collection Time: 10/09/22 11:41 PM   Result Value Ref Range    Sodium 151 (H) 136 - 145 mmol/L   Sodium    Collection Time: 10/10/22  5:35 AM   Result Value Ref Range    Sodium 149 (H) 136 - 145 mmol/L   CBC auto differential    Collection Time: 10/10/22  5:35 AM   Result Value Ref Range    WBC 3.71 (L) 3.90 - 12.70 K/uL    RBC 2.14 (L) 4.60 - 6.20 M/uL    Hemoglobin 6.4 (L) 14.0 - 18.0 g/dL    Hematocrit 19.4 (LL) 40.0 - 54.0 %    MCV 91 82 - 98 fL    MCH 29.9 27.0 - 31.0 pg    MCHC 33.0 32.0 - 36.0 g/dL    RDW 16.8 (H) 11.5 - 14.5 %    Platelets 63 (L) 150 - 450 K/uL    MPV 12.4 9.2 - 12.9 fL    Immature Granulocytes Test Not Performed 0.0 - 0.5 %    Immature Grans (Abs) Test Not Performed 0.00 - 0.04 K/uL    nRBC 0 0 /100 WBC    Gran % 83.0 (H) 38.0 - 73.0 %    Lymph % 11.0 (L) 18.0 - 48.0 %    Mono % 6.0 4.0 - 15.0 %    Eosinophil % 0.0 0.0 - 8.0 %    Basophil % 0.0 0.0 - 1.9 %    Platelet Estimate Decreased (A)     Hypo Occasional     Ovalocytes Occasional     Tear Drop Cells Occasional     Large/Giant Platelets Present     Differential Method Automated    Comprehensive metabolic panel    Collection Time: 10/10/22  5:35 AM   Result Value Ref Range    Sodium 149 (H) 136 - 145 mmol/L    Potassium 3.6 3.5 - 5.1 mmol/L    Chloride 119 (H) 95 - 110 mmol/L    CO2 22 (L) 23 - 29 mmol/L    Glucose 152 (H) 70 - 110 mg/dL    BUN 28 (H) 8 - 23 mg/dL    Creatinine 1.1 0.5 - 1.4 mg/dL    Calcium 8.7 8.7 - 10.5 mg/dL    Total Protein 6.5 6.0 - 8.4 g/dL    Albumin 2.0 (L) 3.5 - 5.2 g/dL    Total Bilirubin 5.6 (H) 0.1 - 1.0 mg/dL    Alkaline Phosphatase 101 55 - 135 U/L    AST 31 10 - 40 U/L    ALT 34 10 - 44 U/L    Anion Gap 8 8 - 16 mmol/L    eGFR >60 >60 mL/min/1.73 m^2   Magnesium    Collection Time: 10/10/22  5:35 AM   Result Value Ref Range    Magnesium 2.1 1.6 - 2.6 mg/dL   Phosphorus     Collection Time: 10/10/22  5:35 AM   Result Value Ref Range    Phosphorus 3.3 2.7 - 4.5 mg/dL   Sodium    Collection Time: 10/10/22 12:46 PM   Result Value Ref Range    Sodium 143 136 - 145 mmol/L   Sodium    Collection Time: 10/10/22  7:22 PM   Result Value Ref Range    Sodium 137 136 - 145 mmol/L   VANCOMYCIN, TROUGH    Collection Time: 10/10/22  9:13 PM   Result Value Ref Range    Vancomycin-Trough 13.9 10.0 - 22.0 ug/mL   CBC auto differential    Collection Time: 10/10/22  9:44 PM   Result Value Ref Range    WBC 4.67 3.90 - 12.70 K/uL    RBC 2.33 (L) 4.60 - 6.20 M/uL    Hemoglobin 7.1 (L) 14.0 - 18.0 g/dL    Hematocrit 20.7 (L) 40.0 - 54.0 %    MCV 89 82 - 98 fL    MCH 30.5 27.0 - 31.0 pg    MCHC 34.3 32.0 - 36.0 g/dL    RDW 16.7 (H) 11.5 - 14.5 %    Platelets 95 (L) 150 - 450 K/uL    MPV 11.8 9.2 - 12.9 fL    Immature Granulocytes 0.9 (H) 0.0 - 0.5 %    Gran # (ANC) 3.9 1.8 - 7.7 K/uL    Immature Grans (Abs) 0.04 0.00 - 0.04 K/uL    Lymph # 0.5 (L) 1.0 - 4.8 K/uL    Mono # 0.2 (L) 0.3 - 1.0 K/uL    Eos # 0.0 0.0 - 0.5 K/uL    Baso # 0.01 0.00 - 0.20 K/uL    nRBC 0 0 /100 WBC    Gran % 84.3 (H) 38.0 - 73.0 %    Lymph % 10.7 (L) 18.0 - 48.0 %    Mono % 3.9 (L) 4.0 - 15.0 %    Eosinophil % 0.0 0.0 - 8.0 %    Basophil % 0.2 0.0 - 1.9 %    Platelet Estimate Clumped (A)     Aniso Slight     Ovalocytes Occasional     Tear Drop Cells Occasional     Large/Giant Platelets Present     Differential Method Automated    Sodium    Collection Time: 10/11/22 12:25 AM   Result Value Ref Range    Sodium 137 136 - 145 mmol/L   Sodium    Collection Time: 10/11/22  5:58 AM   Result Value Ref Range    Sodium 138 136 - 145 mmol/L   CBC auto differential    Collection Time: 10/11/22  5:58 AM   Result Value Ref Range    WBC 4.64 3.90 - 12.70 K/uL    RBC 2.30 (L) 4.60 - 6.20 M/uL    Hemoglobin 7.0 (L) 14.0 - 18.0 g/dL    Hematocrit 20.5 (L) 40.0 - 54.0 %    MCV 89 82 - 98 fL    MCH 30.4 27.0 - 31.0 pg    MCHC 34.1 32.0 - 36.0 g/dL    RDW  16.8 (H) 11.5 - 14.5 %    Platelets SEE COMMENT 150 - 450 K/uL    MPV 10.6 9.2 - 12.9 fL    Immature Granulocytes 0.6 (H) 0.0 - 0.5 %    Gran # (ANC) 3.8 1.8 - 7.7 K/uL    Immature Grans (Abs) 0.03 0.00 - 0.04 K/uL    Lymph # 0.6 (L) 1.0 - 4.8 K/uL    Mono # 0.2 (L) 0.3 - 1.0 K/uL    Eos # 0.0 0.0 - 0.5 K/uL    Baso # 0.01 0.00 - 0.20 K/uL    nRBC 0 0 /100 WBC    Gran % 80.9 (H) 38.0 - 73.0 %    Lymph % 13.4 (L) 18.0 - 48.0 %    Mono % 4.7 4.0 - 15.0 %    Eosinophil % 0.2 0.0 - 8.0 %    Basophil % 0.2 0.0 - 1.9 %    Differential Method Automated    Comprehensive metabolic panel    Collection Time: 10/11/22  5:58 AM   Result Value Ref Range    Sodium 137 136 - 145 mmol/L    Potassium 3.3 (L) 3.5 - 5.1 mmol/L    Chloride 110 95 - 110 mmol/L    CO2 20 (L) 23 - 29 mmol/L    Glucose 92 70 - 110 mg/dL    BUN 28 (H) 8 - 23 mg/dL    Creatinine 0.9 0.5 - 1.4 mg/dL    Calcium 8.2 (L) 8.7 - 10.5 mg/dL    Total Protein 6.6 6.0 - 8.4 g/dL    Albumin 2.0 (L) 3.5 - 5.2 g/dL    Total Bilirubin 7.5 (H) 0.1 - 1.0 mg/dL    Alkaline Phosphatase 116 55 - 135 U/L    AST 48 (H) 10 - 40 U/L    ALT 47 (H) 10 - 44 U/L    Anion Gap 7 (L) 8 - 16 mmol/L    eGFR >60 >60 mL/min/1.73 m^2   Magnesium    Collection Time: 10/11/22  5:58 AM   Result Value Ref Range    Magnesium 1.8 1.6 - 2.6 mg/dL   Phosphorus    Collection Time: 10/11/22  5:58 AM   Result Value Ref Range    Phosphorus 2.8 2.7 - 4.5 mg/dL   Lactate dehydrogenase    Collection Time: 10/11/22  5:58 AM   Result Value Ref Range     (H) 110 - 260 U/L   ISTAT PROCEDURE    Collection Time: 10/11/22 12:27 PM   Result Value Ref Range    POC PH 7.497 (H) 7.35 - 7.45    POC PCO2 26.3 (LL) 35 - 45 mmHg    POC PO2 77 (L) 80 - 100 mmHg    POC HCO3 20.4 (L) 24 - 28 mmol/L    POC BE -3 -2 to 2 mmol/L    POC SATURATED O2 97 95 - 100 %    POC TCO2 21 (L) 23 - 27 mmol/L    Sample ARTERIAL     Site LR    Ammonia    Collection Time: 10/11/22 12:33 PM   Result Value Ref Range    Ammonia 33 10 - 50  "umol/L   Sodium    Collection Time: 10/11/22 12:33 PM   Result Value Ref Range    Sodium 136 136 - 145 mmol/L      No results found for: PHENYTOIN, PHENOBARB, VALPROATE, CBMZ      EXAMINATION    VITALS   Vitals:    10/11/22 0554 10/11/22 0803 10/11/22 1158 10/11/22 1203   BP:  (!) 119/56 (!) 110/55    BP Location:  Left arm Left arm    Patient Position:  Lying Sitting    Pulse:  91 77 77   Resp:  20 20    Temp:  97.8 °F (36.6 °C) 98 °F (36.7 °C)    TempSrc:  Axillary Oral    SpO2:  99% 97%    Weight: 102.8 kg (226 lb 10.1 oz)      Height:            CONSTITUTIONAL  General Appearance: NAD, unremarkable, age appropriate, lying in bed, overweight, calm    MUSCULOSKELETAL  Muscle Strength and Tone: generalized weakness / fatigue noted in BUEs and BLEs   Abnormal Involuntary Movements: none observed   Gait and Station: Attempted but unable to assess due to medical acuity     PSYCHIATRIC   Behavior/Cooperation:  cooperative, eye contact intact, calm  Speech:  normal tone, normal pitch, normal volume, slowed  Language: grossly intact, able to name with spontaneous speech  Mood: "pretty good"  Affect:  mildly constricted   Associations: intermittent CHANDU  Thought Process: Linear with intermittent confusion   Thought Content: denies SI, HI, AH, VH, TH, delusions, or paranoia (no RIS observed)  Sensorium: Delirium/Somnolence  Alert and Oriented: to person and type of place only ; disoriented to name of place, city, state, month, year, and situation   Memory: 3/3 immediate, 0/3 at 5 minutes    Recent:  Impaired / Limited ; able to report minimal and intermittent recent events   Remote:  Impaired / Limited ; Named 1/4 past presidents   Attention/concentration: Impaired / Limited. Able to spell [id number] (after multiple attempts) but NOT [id number].   Similarities: Impaired / Limited (difference between apple and orange?)  Abstract reasoning: Impaired / Limited  Fund of Knowledge: Named 1/4 past presidents   Insight: Limited "   Judgment: Limited     CAM ICU Delirium Assessment - NEGATIVE at beginning of assessment and POSITIVE at end of assessment (waxing and waning) (struggled significantly with attention / concentration / focus tasks)     MOCA - Patient was unwilling / unable to participate in a MOCA today despite multiple attempts.     Is the patient aware of the biomedical complications associated with substance abuse and mental illness? Yes / limited due to AMS      MEDICAL DECISION MAKING    ASSESSMENT      Acute Metabolic Encephalopathy (slowly improving)   Delirium due to Medical Condition with Behavioral Disturbance  Alcohol Abuse (by hx)  (Rule out dementia with behavioral disturbance)     RECOMMENDATIONS       - Patient does not currently meet PEC criteria due to not being an imminent threat to self/others and not being gravely disabled 2/2 mental illness at this time.      Implement the below DELIRIUM BEHAVIOR MANAGEMENT:  - Minimize use of restraints; if physical restraints necessary, please utilize medical/chemical prns for agitation (can use LOW DOSE Zyprexa 2.5 mg PO/IM q8 hours PRN) (discussed risks/benefits/alt vs no treatment with patient).  - Keep shades open and room lit during day and room dim at night in order to promote healthy circadian rhythms.  - Encourage family at bedside.  - Keep whiteboard in patient's room current with the date and name of the members of patient's team for easy patient self re-orientation.  - Avoid benzodiazepines, antihistamines, anticholinergics, hypnotics, and minimize opiates while controlling for pain as these medications may exacerbate delirium.     - Would repeat EKG to ensure that QTc remains below 500, especially if PRN Zyprexa is needed as noted above.    - AVOID PRN Haldol at this time due to excessive morning somnolence when given overnight and QTc near 500 (496 on 10/08/2022).       - Continue to provide folate / thiamine / multi-vitamin supplementation given hx of alcohol  "abuse (discussed risks/benefits/alt vs no treatment with patient).     - Psychotherapy was performed with patient as noted above with a focus on improving orientation, confusion, and behavioral modification.    - Patient's most recent labs, imaging, procedures, studies, and EKG were reviewed today ; UDS negative on admission ; ethanol < 10 on admission ; PETH < 10 on 10/06/22 ; covid negative ; TSH wnl ; HIV and RPR non-reactive ; ammonia wnl ; B12 and Folate wnl from 09/24/22 ; MRI Brain on 10/09/22 with "brain parenchymal volume loss and prominence of the CSF spaces.  There is encephalomalacia in the left frontal parietal lobes, compatible with a remote infarction." ; EEG from 10/07/22 with "moderate/severe degree of diffuse slowing and generalized triphasic waves.  These findings are consistent with a diffuse disturbance of cerebral function or encephalopathy secondary to metabolic, toxic, infectious, inflammatory, or other systemic processes. No potentially epileptogenic discharges or seizure activity are present during the recording." ; QTc was 496 from EKG on 10/08/22.      - Will continue to make attempts to discuss alcohol cessation / continued sobriety / substance abuse treatment options when patient's mental status more allows him to have these discussions.       - Patient was instructed to call 911 and/or [phone] and return to the nearest ED if he begins feeling suicidal, homicidal, or gravely disabled (for s/p this hospitalization).      - Thank you for this consult ; will continue to follow patient         Total time spent with patient and/or managing/coordinating patient's care today (excluding the time spent on psychotherapy): 72 minutes   Time spent on psychotherapy today (as noted above): 20 minutes   Total time for encounter today including psychotherapy: 92 minutes      More than 50% of the time was spent counseling/coordinating care.     Consulting clinician was informed of the encounter and consult " note.       STAFF:  Perico Botello MD  [facility]   10/11/2022

## 2022-10-11 NOTE — ASSESSMENT & PLAN NOTE
-follow urine cx  -add rocephin    10/9 abx escalated with fevers. This is likely FOUO or possibly r/t blood transfusions- UTI likely not the culprit of AMS  -may stop or deescalate abx soon  -follow urine and blood cx     10/11 stop abx

## 2022-10-11 NOTE — ASSESSMENT & PLAN NOTE
Tele  Cycle CE troponin elevated, however cardiology states Low suspicion for cardiac etiology      Had a recent echo 9/2022 HFpEF  Fall precautions     Per neurology recs: MRI C/T/L spine for debility and urinary incontinence

## 2022-10-11 NOTE — PROGRESS NOTES
[facility] Medicine  Progress Note    Patient Name: Author Rafael Lawson  MRN: [id number]  Patient Class: IP- Inpatient   Admission Date: 10/5/2022  Length of Stay: 5 days  Attending Physician: Dayne Roman, *  Primary Care Provider: Primary Doctor No        Subjective:     Principal Problem:Syncope and collapse        HPI:  71-year-old male, homeless, unknown medical history, brought to the ED by EMS for altered mental status.  Patient is confused about his situation he alert and oriented to self and place.  Per nursing chart, patient was found in the hotel bathroom on the floor, covered in urine and feces.  Patient doesn't remember leading events, denied chest pain or dizziness prior to, no n/v. Says that he might have a fallen and woke up in a hotel bed then EMS brought him in here. Reports shortness of breath, bilaterally lower extremities weakness, unable to move them without assistant.  Denies chest pain, abdominal pain, nausea/vomiting, no alcohol or drug uses. DW ER MD.       Overview/Hospital Course:  No notes on file    Interval hx: lethargic this am. Sleeping. Appears apneic with sleeping. Opens eyes and groans to painful stimulus. He was given haldol this am.      ROS: unable to obtain, mental status change     Objective:     Vital Signs (Most Recent):  Temp: 97.8 °F (36.6 °C) (10/11/22 0803)  Pulse: 91 (10/11/22 0803)  Resp: 20 (10/11/22 0803)  BP: (!) 119/56 (10/11/22 0803)  SpO2: 99 % (10/11/22 0803)   Vital Signs (24h Range):  Temp:  [96.1 °F (35.6 °C)-97.8 °F (36.6 °C)] 97.8 °F (36.6 °C)  Pulse:  [] 91  Resp:  [16-20] 20  SpO2:  [93 %-99 %] 99 %  BP: (107-120)/(55-64) 119/56     Weight: 102.8 kg (226 lb 10.1 oz)  Body mass index is 29.9 kg/m².    Intake/Output Summary (Last 24 hours) at 10/11/2022 1041  Last data filed at 10/11/2022 0719  Gross per 24 hour   Intake 800 ml   Output 600 ml   Net 200 ml        Physical Exam  Constitutional:       General: He is not in  acute distress.     Appearance: He is not diaphoretic.      Comments: Lethargic    HENT:      Head: Normocephalic and atraumatic.      Nose: Nose normal.      Mouth/Throat:      Pharynx: Oropharynx is clear.   Eyes:      Conjunctiva/sclera: Conjunctivae normal.      Pupils: Pupils are equal, round, and reactive to light.   Cardiovascular:      Rate and Rhythm: Normal rate and regular rhythm.      Pulses: Normal pulses.      Heart sounds: Normal heart sounds.   Pulmonary:      Effort: Pulmonary effort is normal.      Breath sounds: Normal breath sounds.   Abdominal:      General: Abdomen is flat. Bowel sounds are normal.      Palpations: Abdomen is soft.      Comments: Mild ascites    Musculoskeletal:      Cervical back: Normal range of motion and neck supple.   Skin:     General: Skin is warm and dry.   Neurological:      Mental Status: He is lethargic and confused.      GCS: GCS eye subscore is 2. GCS verbal subscore is 2. GCS motor subscore is 4.      Cranial Nerves: No dysarthria.      Sensory: No sensory deficit.      Motor: Weakness present.      Coordination: Coordination abnormal.   Psychiatric:         Behavior: Behavior is not agitated.         Cognition and Memory: Cognition is impaired.         Judgment: Judgment is not impulsive.       Significant Labs: All pertinent labs within the past 24 hours have been reviewed.    Significant Imaging: I have reviewed all pertinent imaging results/findings within the past 24 hours.      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 This is likely FOUO or possibly r/t blood transfusions- UTI likely not the culprit of AMS  -may stop or deescalate abx soon  -follow urine and blood cx     10/11 stop abx     Encephalopathy, metabolic    See AMS     Discharge planning issues  Pt appeared disheveled at POC he's reported to be living in a hotel room and likely is abusing etoh.   May benefit from placement. SW for DC planning    -patient's nephew would like him to be in a nursing home in [place] with Hospice.   -Patient is now DNR.   10/10 Son Morgan, Nephew Filipe at bedside. Given patient clinical status, plan of care, they voiced understanding, all questions answered. Family involved in decision making.     10/11 CM attempting SNF, hospice or IP hospice     Transaminitis  Mild elevation AST/ALT WNL, Tbili 3.5  AST elevated I suspect from etoh abuse   -will order liver US:No acute abnormality or biliary ductal dilation.     and hepatitis panel (negative)   -repeat ammonia level WNL        Hyponatremia  ?beer potomania. Appears chronic do not suspect it's c/t his AMS  Mey and Osm  Serial Na while giving isotonic saline in case this is siadh which I highly doubt    Na now 141     10/9 Na 151- start d10 gtt at 50 ml/hr, recheck na in 4 hours   10/10 fluids transitioned to D5 at 100 ml/hr yesterday evening, Na improving 149, continue to correct and monitor   10/11 Na 137 dextrose stopped          FRANK (acute kidney injury)  Patient with acute kidney injury likely due to IVVD/dehydration   Order urine stuides   IVF  Repeat chem  Limit nephrotoxins   -increase IVF  -also likely s/t anemia   -improving Cr 0.9    Intravascular volume depletion  Continue IVF/PO hydration     AMS (altered mental status)  He denies eating and drinking well and has not been taking care of himself. Not sure of the validity of this. Family reports  ETOH abuse 6 months ago.  MRI w/o acute infarct, but shows Left frontoparietal encephalomalacia. . Neuro checks and neuro c/s  UDS (negative) and etoh level (negative), PETH (negative)  PT/OT/ST/SW  -neurology on board   - EEG to assess for nonepileptic seizure activity:  moderate/severe degree of diffuse slowing and generalized triphasic waves.  These findings are consistent with a diffuse disturbance of cerebral function or encephalopathy secondary to metabolic, toxic, infectious, inflammatory, or other systemic processes. No potentially epileptogenic discharges or seizure activity are present during the recording.   -ammonia (WNL) RPR (nonreactive), HIV (nonreactive), ABG (mildly hypoxic, added supplemental O2)   -CIWA q 4 hours with PRN ativan if he is withdrawing from ETOH - stopped   -UA with rare bacteria- started IV rocephin, abx escalated with fevers, now no fevers since 10/8  - stop abx, no source of infection found   -MRA head: Motion degraded examination, without compelling evidence of high-grade stenosis or large vessel occlusion.   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 discussed with  Vascular neurology who does not recommend  Intervention due to no acute stroke and would medically manage. Cannot give antiplatelets at this time due to pancytopenia.   -will discuss LP with neurology - neuro recs: no LP at this time, official consult to vascular neurology, MRI brain w w/o to assess for meningeal enhancements:     Limited examination due to motion artifact.  No acute intracranial abnormality or evidence of meningeal enhancement.  -mental status mildly improved 10/8, slightly worse 10/9, AAOx3 10/10, llethargic 10/11 however given haldol   -febrile overnight 10/8, redraw blood cx (NG), urine cx (NG),   CXR:Heart size is similar to previous exam, not enlarged.  There is calcification along the wall of the aorta.  No large volume of pleural fluid is present on this  single view.  There are degenerative changes in the spine.  Lung fields are somewhat underinflated, with no focal consolidation present.    -escalated abx to vancomycin, zosyn. - stopped       Pancytopenia    Prominent periaortic and bilateral iliac and inguinal lymph nodes visualized.  Findings are concerning for lymphoma per CT ab/pel 9/24  Hgb 6.1, platelets 60, WBC 3.85  Anemia panel with low reticulocytes   B12, folate WNL   Stool negative for OCB 9/24  Transfuse 2 units PRBC 10/6, two MD signature needed for consent due to patient with AMS and no family contact available.   Consult Hemoc     10/8 Hgb 6.9 - 1 unit PRBC ordered   10/9 platelets 39. Will transfuse if < 20 or obvious signs of bleeding. Prednisone started per [facility] recs. - concern for AOCD however Direct sheila +   10/10 Hgb 6.4, Platelets 63. Transfuse 1 unit PRBC   Appreciate Hemoc recs     10/11 suspected severe anemia of chronic disease. Direct antiglobulin test (10/8/22) was positive of unclear significance.  - minimize blood draws to minimize worsening of anemia  -may need bone marrow bx   Hgb 7  Platelets clumped, need to redraw   Appreciate hematology recs  - , haptoglobin pending       VTE Risk Mitigation (From admission, onward)         Ordered     IP VTE HIGH RISK PATIENT  Once         10/05/22 1609     Place sequential compression device  Until discontinued         10/05/22 1609                Discharge Planning   ANUSHA: 10/11/2022     Code Status: DNR   Is the patient medically ready for discharge?:     Reason for patient still in hospital (select all that apply): Patient trending condition  Discharge Plan A: Skilled Nursing Facility   Discharge Delays: (!) Post-Acute Set-up (Pending intake forms to be done. DC tmrw 10/11)              Sheree Palma NP  Department of Hospital Medicine   [facility]

## 2022-10-11 NOTE — ASSESSMENT & PLAN NOTE
Prominent periaortic and bilateral iliac and inguinal lymph nodes visualized.  Findings are concerning for lymphoma per CT ab/pel 9/24  Hgb 6.1, platelets 60, WBC 3.85  Anemia panel with low reticulocytes   B12, folate WNL   Stool negative for OCB 9/24  Transfuse 2 units PRBC 10/6, two MD signature needed for consent due to patient with AMS and no family contact available.   Consult [facility]     10/8 Hgb 6.9 - 1 unit PRBC ordered   10/9 platelets 39. Will transfuse if < 20 or obvious signs of bleeding. Prednisone started per [facility] recs. - concern for AOCD however Direct sheila +   10/10 Hgb 6.4, Platelets 63. Transfuse 1 unit PRBC   Appreciate Hemoc recs     10/11 suspected severe anemia of chronic disease. Direct antiglobulin test (10/8/22) was positive of unclear significance.  - minimize blood draws to minimize worsening of anemia  -may need bone marrow bx   Hgb 7  Platelets clumped, need to redraw   Appreciate hematology recs  - , haptoglobin pending     Refer to hemoc outpatient  Plan to taper prednisone by 10 mg weekly

## 2022-10-11 NOTE — PT/OT/SLP PROGRESS
"Speech Language Pathology Treatment    Patient Name:  Author Rafael Lawson   MRN:  [id number]  Admitting Diagnosis: Syncope and collapse    Recommendations:                 General Recommendations:  post acute   Diet recommendations:  Mechanical soft, Liquid Diet Level: Thin   Aspiration Precautions: upright for meals, crush oral meds, slow rate, assist with feeding, NO STRAWS, small bites, NO Talking while eating    General Precautions: Standard, fall  Communication strategies:  reorient and redirect as needed    Subjective     Pt seen this date for diet modification.  Patient goals: "this was the best meal ever."     Pain/Comfort:  Pain Rating 1: 0/10    Respiratory Status: Room air    Objective:     Has the patient been evaluated by SLP for swallowing?   Yes  Keep patient NPO? No   Current Respiratory Status:    room air     Swallowing: Pt found in room, he was semi-reclined in bed. Pt needed to be repositioned in bed, pt reported that "this was his best meal." Pt agreed to PO trials. He cited his dentures were lost and he did have some trouble chewing his lunch. Pt consumed sips of water with no audible dysphagia signs. Pt with no voice changes and no coughing. Pt tolerated small bites of pears with alternation of liquid sips. Pt needed cues to be redirected and also cues to not talk while swallowing. Discussed with nsg to crush oral meds and eliminate use of straws when drinking liquids as pt tends to be impulsive with meals. Pt also needs tray set-up to help him with slowing down his rate of intake.     Assessment:     Author Rafael Lawson is a 71 y.o. male admitted with Syncope and collapse who will benefit from diet modification for safe PO intake.     Goals:   Multidisciplinary Problems       SLP Goals          Problem: SLP    Goal Priority Disciplines Outcome   SLP Goal     SLP Adequate for Care Transition   Description: STGs:  1. Pt will participate in clinical swallow assessment to determine safest and least " restrictive diet level. -ongoing  2. Pt will safely tolerate a Full Liquid diet without overt s/sx of aspiration given 1:1 assist with feeding.                        Plan:       Plan of Care expires:  11/06/22  Plan of Care reviewed with:  patient   SLP Follow-Up:  No       Discharge recommendations:   (post acute)   Barriers to Discharge:  None    Time Tracking:     SLP Treatment Date:   10/11/22  Speech Start Time:  1448  Speech Stop Time:  1458     Speech Total Time (min):  10 min    Billable Minutes: Treatment Swallowing Dysfunction 10    10/11/2022

## 2022-10-11 NOTE — ASSESSMENT & PLAN NOTE
Patient with acute kidney injury likely due to IVVD/dehydration   Order urine stuides   IVF  Repeat chem  Limit nephrotoxins   -increase IVF  -also likely s/t anemia   -improving Cr 0.9

## 2022-10-12 VITALS
SYSTOLIC BLOOD PRESSURE: 135 MMHG | DIASTOLIC BLOOD PRESSURE: 64 MMHG | HEART RATE: 96 BPM | RESPIRATION RATE: 18 BRPM | TEMPERATURE: 97 F | WEIGHT: 226.63 LBS | BODY MASS INDEX: 30.04 KG/M2 | OXYGEN SATURATION: 96 % | HEIGHT: 73 IN

## 2022-10-12 PROBLEM — N17.9 AKI (ACUTE KIDNEY INJURY): Status: RESOLVED | Noted: 2022-10-05 | Resolved: 2022-10-12

## 2022-10-12 PROBLEM — R55 SYNCOPE AND COLLAPSE: Status: RESOLVED | Noted: 2022-10-05 | Resolved: 2022-10-12

## 2022-10-12 PROBLEM — E87.1 HYPONATREMIA: Status: RESOLVED | Noted: 2022-10-05 | Resolved: 2022-10-12

## 2022-10-12 PROBLEM — E87.6 HYPOKALEMIA: Status: RESOLVED | Noted: 2022-10-11 | Resolved: 2022-10-12

## 2022-10-12 LAB
ALBUMIN SERPL BCP-MCNC: 1.9 G/DL (ref 3.5–5.2)
ALP SERPL-CCNC: 130 U/L (ref 55–135)
ALT SERPL W/O P-5'-P-CCNC: 56 U/L (ref 10–44)
ANION GAP SERPL CALC-SCNC: 5 MMOL/L (ref 8–16)
ANISOCYTOSIS BLD QL SMEAR: SLIGHT
AST SERPL-CCNC: 45 U/L (ref 10–40)
BASOPHILS # BLD AUTO: 0 K/UL (ref 0–0.2)
BASOPHILS NFR BLD: 0 % (ref 0–1.9)
BILIRUB SERPL-MCNC: 5.8 MG/DL (ref 0.1–1)
BUN SERPL-MCNC: 27 MG/DL (ref 8–23)
CALCIUM SERPL-MCNC: 8.1 MG/DL (ref 8.7–10.5)
CHLORIDE SERPL-SCNC: 109 MMOL/L (ref 95–110)
CO2 SERPL-SCNC: 20 MMOL/L (ref 23–29)
CREAT SERPL-MCNC: 0.7 MG/DL (ref 0.5–1.4)
DIFFERENTIAL METHOD: ABNORMAL
EOSINOPHIL # BLD AUTO: 0 K/UL (ref 0–0.5)
EOSINOPHIL NFR BLD: 0.2 % (ref 0–8)
ERYTHROCYTE [DISTWIDTH] IN BLOOD BY AUTOMATED COUNT: 16.6 % (ref 11.5–14.5)
EST. GFR  (NO RACE VARIABLE): >60 ML/MIN/1.73 M^2
GLUCOSE SERPL-MCNC: 91 MG/DL (ref 70–110)
HCT VFR BLD AUTO: 22.9 % (ref 40–54)
HGB BLD-MCNC: 7.7 G/DL (ref 14–18)
HYPOCHROMIA BLD QL SMEAR: ABNORMAL
IMM GRANULOCYTES # BLD AUTO: 0.04 K/UL (ref 0–0.04)
IMM GRANULOCYTES NFR BLD AUTO: 0.8 % (ref 0–0.5)
LYMPHOCYTES # BLD AUTO: 0.5 K/UL (ref 1–4.8)
LYMPHOCYTES NFR BLD: 10.8 % (ref 18–48)
MAGNESIUM SERPL-MCNC: 1.8 MG/DL (ref 1.6–2.6)
MCH RBC QN AUTO: 29.6 PG (ref 27–31)
MCHC RBC AUTO-ENTMCNC: 33.6 G/DL (ref 32–36)
MCV RBC AUTO: 88 FL (ref 82–98)
MONOCYTES # BLD AUTO: 0.2 K/UL (ref 0.3–1)
MONOCYTES NFR BLD: 4.2 % (ref 4–15)
NEUTROPHILS # BLD AUTO: 4 K/UL (ref 1.8–7.7)
NEUTROPHILS NFR BLD: 84 % (ref 38–73)
NRBC BLD-RTO: 0 /100 WBC
PHOSPHATE SERPL-MCNC: 2.5 MG/DL (ref 2.7–4.5)
PLATELET # BLD AUTO: 52 K/UL (ref 150–450)
PLATELET BLD QL SMEAR: ABNORMAL
PMV BLD AUTO: 11.9 FL (ref 9.2–12.9)
POCT GLUCOSE: 185 MG/DL (ref 70–110)
POTASSIUM SERPL-SCNC: 3.7 MMOL/L (ref 3.5–5.1)
PROT SERPL-MCNC: 6 G/DL (ref 6–8.4)
RBC # BLD AUTO: 2.6 M/UL (ref 4.6–6.2)
SODIUM SERPL-SCNC: 134 MMOL/L (ref 136–145)
SODIUM SERPL-SCNC: 135 MMOL/L (ref 136–145)
SODIUM SERPL-SCNC: 135 MMOL/L (ref 136–145)
WBC # BLD AUTO: 4.71 K/UL (ref 3.9–12.7)

## 2022-10-12 PROCEDURE — 97110 THERAPEUTIC EXERCISES: CPT | Mod: CQ

## 2022-10-12 PROCEDURE — 63600175 PHARM REV CODE 636 W HCPCS: Performed by: NURSE PRACTITIONER

## 2022-10-12 PROCEDURE — 99233 SBSQ HOSP IP/OBS HIGH 50: CPT | Mod: ,,, | Performed by: PSYCHIATRY & NEUROLOGY

## 2022-10-12 PROCEDURE — 97530 THERAPEUTIC ACTIVITIES: CPT | Mod: CQ

## 2022-10-12 PROCEDURE — 99233 PR SUBSEQUENT HOSPITAL CARE,LEVL III: ICD-10-PCS | Mod: ,,, | Performed by: PSYCHIATRY & NEUROLOGY

## 2022-10-12 PROCEDURE — 36415 COLL VENOUS BLD VENIPUNCTURE: CPT | Performed by: INTERNAL MEDICINE

## 2022-10-12 PROCEDURE — 84100 ASSAY OF PHOSPHORUS: CPT | Performed by: NURSE PRACTITIONER

## 2022-10-12 PROCEDURE — 80053 COMPREHEN METABOLIC PANEL: CPT | Performed by: NURSE PRACTITIONER

## 2022-10-12 PROCEDURE — 99900035 HC TECH TIME PER 15 MIN (STAT)

## 2022-10-12 PROCEDURE — 25000003 PHARM REV CODE 250: Performed by: INTERNAL MEDICINE

## 2022-10-12 PROCEDURE — 97535 SELF CARE MNGMENT TRAINING: CPT

## 2022-10-12 PROCEDURE — 84295 ASSAY OF SERUM SODIUM: CPT | Performed by: INTERNAL MEDICINE

## 2022-10-12 PROCEDURE — 25000003 PHARM REV CODE 250: Performed by: NURSE PRACTITIONER

## 2022-10-12 PROCEDURE — 90833 PR PSYCHOTHERAPY W/PATIENT W/E&M, 30 MIN (ADD ON): ICD-10-PCS | Mod: ,,, | Performed by: PSYCHIATRY & NEUROLOGY

## 2022-10-12 PROCEDURE — 90833 PSYTX W PT W E/M 30 MIN: CPT | Mod: ,,, | Performed by: PSYCHIATRY & NEUROLOGY

## 2022-10-12 PROCEDURE — 85025 COMPLETE CBC W/AUTO DIFF WBC: CPT | Performed by: NURSE PRACTITIONER

## 2022-10-12 PROCEDURE — 97530 THERAPEUTIC ACTIVITIES: CPT

## 2022-10-12 PROCEDURE — 94761 N-INVAS EAR/PLS OXIMETRY MLT: CPT

## 2022-10-12 PROCEDURE — 83735 ASSAY OF MAGNESIUM: CPT | Performed by: NURSE PRACTITIONER

## 2022-10-12 RX ADMIN — PREDNISONE 40 MG: 20 TABLET ORAL at 09:10

## 2022-10-12 RX ADMIN — FOLIC ACID 1 MG: 1 TABLET ORAL at 09:10

## 2022-10-12 RX ADMIN — Medication: at 09:10

## 2022-10-12 RX ADMIN — FAMOTIDINE 20 MG: 20 TABLET ORAL at 09:10

## 2022-10-12 RX ADMIN — THERA TABS 1 TABLET: TAB at 09:10

## 2022-10-12 NOTE — NURSING
Notified MRI that pt is ready, blood completed on day shift. Spoke with Abhishek,stated she will be here shortly to get the pt.

## 2022-10-12 NOTE — PROGRESS NOTES
PSYCHIATRY INPATIENT PROGRESS NOTE  SUBSEQUENT HOSPITAL VISIT      10/12/2022 11:05 AM   Author Rafale Machado.   1950   [id number]           DATE OF ADMISSION: 10/5/2022 12:28 PM    SITE: [facility]    CURRENT LEGAL STATUS: Patient does not currently meet PEC criteria due to not currently being an imminent threat to self/others and not being gravely disabled 2/2 mental illness at this time.      HISTORY     Per Initial History from Primary Team:   71-year-old male, homeless, unknown medical history, brought to the ED by EMS for altered mental status.  Patient is confused about his situation he alert and oriented to self and place.  Per nursing chart, patient was found in the hotel bathroom on the floor, covered in urine and feces.  Patient doesn't remember leading events, denied chest pain or dizziness prior to, no n/v. Says that he might have a fallen and woke up in a hotel bed then EMS brought him in here. Reports shortness of breath, bilaterally lower extremities weakness, unable to move them without assistant.  Denies chest pain, abdominal pain, nausea/vomiting, no alcohol or drug uses. XOCHILT CATES MD.   Interval History from Primary Team on 10/11/2022:  lethargic this am. Sleeping. Appears apneic with sleeping. Opens eyes and groans to painful stimulus. He was given haldol this am.   Interval History from Neurology Team on 10/12/2022:  Patient is a 71 y.o. male who was admitted on 10/5/2022 for syncope and encephalopathy. Neurology consulted for concern for new stroke.   Today the patient states that he is feeling better today although drowsy. MRI C/ T spine with mild canal stenosis confounded with motion abnormalities, unable to tolerate MRI L spine.  No more agitation episodes overnight. Improved mentation and movement in upper and lower extremities.       Chief Complaint / Reason for Original Psychiatry Consult: Behavioral Disturbances / Agitation in Delirium vs Dementia        Subjective / Interval  "Psychiatric History Today (10/12/2022) (with Psychiatric ROS below):   Author Rafael Lawson is a 71 y.o. male with a past medical history as noted above/below, and a past psychiatric history of alcohol use disorder, currently being treated by his inpatient primary team for a principle problem of syncope and collapse.  Psychiatry was originally consulted as noted above.  The patient was seen and examined again today.  The chart was reviewed again today.  On examination today, the patient was more alert and oriented to person, type of place, name of place, state, month, and year.  He was disoriented to city and situation.  He was CAM-ICU NEGATIVE for delirium today.  His mental status appears improving when compared to yesterday (patient now receiving IV Thiamine).  Patient was again unwilling / unable to participate in a MOCA today despite multiple attempts.  He did not require any PRN psychiatric medications overnight.  He continues to exhibit some intermittent confusion during the assessment, but this is improving when compared to yesterday.  He again denies any current or prior s/s consistent with lianna, psychosis, depression, anxiety, panic, OCD, PTSD, eating disorder, or substance abuse other than a multi-year hx of heavy alcohol abuse / dependence.  He states that his last alcohol intake was about 5 months ago.  Despite counseling, education, and motivational interviewing, he is not interested in residential alcohol rehab, IOP treatment, outpatient MH / addiction treatment, MAT, or AA/NA meetings.  He endorses sleeping "like a rock" overnight 8-9 hours and endorses a good / improving appetite.  He again denies any current or prior active / passive SI / HI.  He again denies any AH, VH, TH, delusions, paranoia, or DIGFAST (lianna) s/s.  He again denies any adverse effects to his current medication regimen.  Regarding current medical/physical complaints, he endorses improving fatigue / generalized weakness and memory " deficits.  He denies any other medical complaints at this time.  NAD was observed during the examination.  Psychotherapy was again implemented as noted below with a focus on improving orientation, confusion, behavior, and alcohol cessation / continued total sobriety.  See detailed psych ROS below (of note, examination was limited due to patient's improving AMS).  See A/P below.           Psychiatric Review Of Systems - Currently, the patient is endorsing and/or denying the following:  (patient's endorsements are BOLDED below; if not BOLDED, then patient denied) (limited validity due to improving AMS):     Denies Symptoms of Depression: diminished mood or loss of interest/anhedonia; irritability, diminished energy, change in sleep, change in appetite, diminished concentration or cognition or indecisiveness, PMA/R, excessive guilt or hopelessness or worthlessness, suicidal ideations     Denies issues with Sleep: initiation, maintenance, early morning awakening with inability to return to sleep     Denies Suicidal/Homicidal ideations: active/passive ideations, organized plans, future intentions     Denies Symptoms of psychosis: hallucinations, delusions, disorganized thinking, disorganized behavior or abnormal motor behavior, or negative symptoms (diminshed emotional expression, avolition, anhedonia, alogia, asociality      Denies Symptoms of lianna or hypomania: elevated, expansive, or irritable mood with increased energy or activity; with inflated self-esteem or grandiosity, decreased need for sleep, increased rate of speech, FOI or racing thoughts, distractibility, increased goal directed activity or PMA, risky/disinhibited behavior     Denies Symptoms of Anxiety: excessive anxiety/worry/fear, more days than not, about numerous issues, difficult to control, with restlessness, fatigue, poor concentration, irritability, muscle tension, sleep disturbance; causes functionally impairing distress      Denies Symptoms of  Panic Disorder: recurrent panic attacks, precipitated or un-precipitated, source of worry and/or behavioral changes secondary; with or without agoraphobia     Denies Symptoms of PTSD: h/o trauma; re-experiencing/intrusive symptoms, avoidant behavior, negative alterations in cognition or mood, or hyperarousal symptoms; with or without dissociative symptoms      Denies Symptoms of OCD: obsessions or compulsions      Denies Symptoms of Eating Disorders: anorexia, bulimia or binging     Endorses Substance Use (hx of alcohol abuse ; denies use in 5 months): intoxication, withdrawal, tolerance, used in larger amounts or duration than intended, unsuccessful attempts to limit or quit, increased time engaging in or seeking out, cravings or strong desire to use, failure to fulfill obligations, negative consequences in social/interpersonal/occupational,/recreational areas, use in dangerous situations, medical or psychological consequences         [facility] Toolkit ASQ Suicide Screening Tool:  In the past few weeks, have you wished you were dead? Denies   In the past few weeks, have you felt that you or your family would be better off if you were dead? Denies  In the past week, have you been having thoughts about killing yourself? Denies  Have you ever tried to kill yourself? Denies  Are you having thoughts of killing yourself right now? Denies        PSYCHOTHERAPY ADD-ON [id number]   30 (16-37*) minutes     Time: 21 minutes  Participants: Met with patient     Therapeutic Intervention Type: behavior modifying psychotherapy, supportive psychotherapy  Why chosen therapy is appropriate versus another modality: relevant to diagnosis, patient responds to this modality, evidence based practice     Target symptoms: disorientation, confusion, behavioral disturbances / agitation, and alcohol abuse / dependence    Primary focus: improving orientation, confusion, behavioral modification, and alcohol cessation / continued total sobriety     Psychotherapeutic techniques: supportive and psychodynamic techniques; psycho-education; reorientation; deep breathing exercises; motivational interviewing; CBT; reality / insight orientation; problem solving techniques and managing life/medical stressors     Outcome monitoring methods: self-report, observation     Patient's response to intervention:  The patient's response to intervention is accepting / limited / improving.     Progress toward goals:  The patient's progress toward goals is fair / limited / improving.          ROS (limited validity due to improving AMS):  General ROS: negative for - chills, fever or night sweats; positive for improving fatigue / generalized weakness   Ophthalmic ROS: negative for - blurry vision, double vision or eye pain  ENT ROS: negative for - sinus pain, headaches, sore throat or visual changes  Allergy and Immunology ROS: negative for - hives, itchy/watery eyes or nasal congestion  Hematological and Lymphatic ROS: negative for - bleeding problems, bruising, jaundice or pallor  Endocrine ROS: negative for - galactorrhea, hot flashes, mood swings, palpitations or temperature intolerance  Respiratory ROS: negative for - cough, hemoptysis, shortness of breath, tachypnea or wheezing  Cardiovascular ROS: negative for - chest pain, dyspnea on exertion, loss of consciousness, palpitations, rapid heart rate or shortness of breath  Gastrointestinal ROS: negative for - appetite loss, nausea, abdominal pain, blood in stools, change in bowel habits, constipation or diarrhea  Genito-Urinary ROS: negative for - incontinence, nocturia or pelvic pain  Musculoskeletal ROS: negative for - joint stiffness, joint swelling, joint pain or muscle pain   Neurological ROS: negative for - dizziness, numbness/tingling or seizures; positive for improving behavioral changes, confusion, & memory loss  Dermatological ROS: negative for dry skin, hair changes, pruritus or rash  Psychiatric ROS: see detailed  psychiatric ROS above in subjective section       PAST MEDICAL & SURGICAL HISTORY   No past medical history on file.  No past surgical history on file.    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    ALLERGIES   Review of patient's allergies indicates:  No Known Allergies    CURRENT MEDICATION REGIMEN   Home Meds:   Prior to Admission medications    Medication Sig Start Date End Date Taking? Authorizing Provider   famotidine (PEPCID) 20 MG tablet Take 1 tablet (20 mg total) by mouth 2 (two) times daily. 10/11/22 10/11/23  Sheree Palma NP   folic acid (FOLVITE) 1 MG tablet Take 1 tablet (1 mg total) by mouth once daily. 10/12/22 10/12/23  Sheree Palma NP   multivitamin Tab Take 1 tablet by mouth once daily. 10/12/22   Sheree Palma NP   polyethylene glycol (GLYCOLAX) 17 gram/dose powder mix 17 g ( 1 capful ) in 8 ounces of liquid and drink  by mouth 3 (three) times daily as needed. 10/11/22   Sheree Palma NP   predniSONE (DELTASONE) 20 MG tablet Take 2 tablets (40 mg total) by mouth once daily for 7 days, THEN 1.5 tablets (30 mg total) once daily for 7 days, THEN 1 tablet (20 mg total) once daily for 7 days, THEN 0.5 tablets (10 mg total) once daily for 7 days. 10/12/22 11/9/22  Sheree Palma NP   thiamine 100 MG tablet Take 1 tablet (100 mg total) by mouth once daily. 10/12/22   Sheree Palma NP         Scheduled Meds:    balsam peru-castor oiL   Topical (Top) BID    famotidine  20 mg Oral BID    folic acid  1 mg Oral Daily    multivitamin  1 tablet Oral Daily    predniSONE  40 mg Oral Daily    thiamine (VITAMIN B1) IVPB  250 mg Intravenous TID      PRN Meds: sodium chloride, sodium chloride, sodium chloride, sodium chloride, sodium chloride, acetaminophen, albuterol-ipratropium, bisacodyL, dextrose 10%, dextrose 10%, glucagon (human  recombinant), glucose, glucose, melatonin, naloxone, ondansetron, polyethylene glycol, prochlorperazine, simethicone, sodium chloride 0.9%   Psychotherapeutics (From admission, onward)      None            LABORATORY DATA   Recent Results (from the past 72 hour(s))   Urinalysis, Reflex to Urine Culture Urine, Clean Catch    Collection Time: 10/09/22  2:31 PM    Specimen: Urine   Result Value Ref Range    Specimen UA Urine, Catheterized     Color, UA Yellow Yellow, Straw, Dahlia    Appearance, UA Hazy (A) Clear    pH, UA 6.0 5.0 - 8.0    Specific Gravity, UA 1.025 1.005 - 1.030    Protein, UA 1+ (A) Negative    Glucose, UA Negative Negative    Ketones, UA Negative Negative    Bilirubin (UA) 1+ (A) Negative    Occult Blood UA 1+ (A) Negative    Nitrite, UA Negative Negative    Urobilinogen, UA 4.0-6.0 (A) <2.0 EU/dL    Leukocytes, UA Negative Negative   Urinalysis Microscopic    Collection Time: 10/09/22  2:31 PM   Result Value Ref Range    RBC, UA 0 0 - 4 /hpf    WBC, UA 1 0 - 5 /hpf    Bacteria None None-Occ /hpf    Hyaline Casts, UA 0 0-1/lpf /lpf    Microscopic Comment SEE COMMENT    Sodium    Collection Time: 10/09/22  6:30 PM   Result Value Ref Range    Sodium 151 (H) 136 - 145 mmol/L   Sodium    Collection Time: 10/09/22 11:41 PM   Result Value Ref Range    Sodium 151 (H) 136 - 145 mmol/L   Sodium    Collection Time: 10/10/22  5:35 AM   Result Value Ref Range    Sodium 149 (H) 136 - 145 mmol/L   CBC auto differential    Collection Time: 10/10/22  5:35 AM   Result Value Ref Range    WBC 3.71 (L) 3.90 - 12.70 K/uL    RBC 2.14 (L) 4.60 - 6.20 M/uL    Hemoglobin 6.4 (L) 14.0 - 18.0 g/dL    Hematocrit 19.4 (LL) 40.0 - 54.0 %    MCV 91 82 - 98 fL    MCH 29.9 27.0 - 31.0 pg    MCHC 33.0 32.0 - 36.0 g/dL    RDW 16.8 (H) 11.5 - 14.5 %    Platelets 63 (L) 150 - 450 K/uL    MPV 12.4 9.2 - 12.9 fL    Immature Granulocytes Test Not Performed 0.0 - 0.5 %    Immature Grans (Abs) Test Not Performed 0.00 - 0.04 K/uL    nRBC 0 0  /100 WBC    Gran % 83.0 (H) 38.0 - 73.0 %    Lymph % 11.0 (L) 18.0 - 48.0 %    Mono % 6.0 4.0 - 15.0 %    Eosinophil % 0.0 0.0 - 8.0 %    Basophil % 0.0 0.0 - 1.9 %    Platelet Estimate Decreased (A)     Hypo Occasional     Ovalocytes Occasional     Tear Drop Cells Occasional     Large/Giant Platelets Present     Differential Method Automated    Comprehensive metabolic panel    Collection Time: 10/10/22  5:35 AM   Result Value Ref Range    Sodium 149 (H) 136 - 145 mmol/L    Potassium 3.6 3.5 - 5.1 mmol/L    Chloride 119 (H) 95 - 110 mmol/L    CO2 22 (L) 23 - 29 mmol/L    Glucose 152 (H) 70 - 110 mg/dL    BUN 28 (H) 8 - 23 mg/dL    Creatinine 1.1 0.5 - 1.4 mg/dL    Calcium 8.7 8.7 - 10.5 mg/dL    Total Protein 6.5 6.0 - 8.4 g/dL    Albumin 2.0 (L) 3.5 - 5.2 g/dL    Total Bilirubin 5.6 (H) 0.1 - 1.0 mg/dL    Alkaline Phosphatase 101 55 - 135 U/L    AST 31 10 - 40 U/L    ALT 34 10 - 44 U/L    Anion Gap 8 8 - 16 mmol/L    eGFR >60 >60 mL/min/1.73 m^2   Magnesium    Collection Time: 10/10/22  5:35 AM   Result Value Ref Range    Magnesium 2.1 1.6 - 2.6 mg/dL   Phosphorus    Collection Time: 10/10/22  5:35 AM   Result Value Ref Range    Phosphorus 3.3 2.7 - 4.5 mg/dL   Sodium    Collection Time: 10/10/22 12:46 PM   Result Value Ref Range    Sodium 143 136 - 145 mmol/L   Sodium    Collection Time: 10/10/22  7:22 PM   Result Value Ref Range    Sodium 137 136 - 145 mmol/L   VANCOMYCIN, TROUGH    Collection Time: 10/10/22  9:13 PM   Result Value Ref Range    Vancomycin-Trough 13.9 10.0 - 22.0 ug/mL   CBC auto differential    Collection Time: 10/10/22  9:44 PM   Result Value Ref Range    WBC 4.67 3.90 - 12.70 K/uL    RBC 2.33 (L) 4.60 - 6.20 M/uL    Hemoglobin 7.1 (L) 14.0 - 18.0 g/dL    Hematocrit 20.7 (L) 40.0 - 54.0 %    MCV 89 82 - 98 fL    MCH 30.5 27.0 - 31.0 pg    MCHC 34.3 32.0 - 36.0 g/dL    RDW 16.7 (H) 11.5 - 14.5 %    Platelets 95 (L) 150 - 450 K/uL    MPV 11.8 9.2 - 12.9 fL    Immature Granulocytes 0.9 (H) 0.0 -  0.5 %    Gran # (ANC) 3.9 1.8 - 7.7 K/uL    Immature Grans (Abs) 0.04 0.00 - 0.04 K/uL    Lymph # 0.5 (L) 1.0 - 4.8 K/uL    Mono # 0.2 (L) 0.3 - 1.0 K/uL    Eos # 0.0 0.0 - 0.5 K/uL    Baso # 0.01 0.00 - 0.20 K/uL    nRBC 0 0 /100 WBC    Gran % 84.3 (H) 38.0 - 73.0 %    Lymph % 10.7 (L) 18.0 - 48.0 %    Mono % 3.9 (L) 4.0 - 15.0 %    Eosinophil % 0.0 0.0 - 8.0 %    Basophil % 0.2 0.0 - 1.9 %    Platelet Estimate Clumped (A)     Aniso Slight     Ovalocytes Occasional     Tear Drop Cells Occasional     Large/Giant Platelets Present     Differential Method Automated    Sodium    Collection Time: 10/11/22 12:25 AM   Result Value Ref Range    Sodium 137 136 - 145 mmol/L   Sodium    Collection Time: 10/11/22  5:58 AM   Result Value Ref Range    Sodium 138 136 - 145 mmol/L   CBC auto differential    Collection Time: 10/11/22  5:58 AM   Result Value Ref Range    WBC 4.64 3.90 - 12.70 K/uL    RBC 2.30 (L) 4.60 - 6.20 M/uL    Hemoglobin 7.0 (L) 14.0 - 18.0 g/dL    Hematocrit 20.5 (L) 40.0 - 54.0 %    MCV 89 82 - 98 fL    MCH 30.4 27.0 - 31.0 pg    MCHC 34.1 32.0 - 36.0 g/dL    RDW 16.8 (H) 11.5 - 14.5 %    Platelets SEE COMMENT 150 - 450 K/uL    MPV 10.6 9.2 - 12.9 fL    Immature Granulocytes 0.6 (H) 0.0 - 0.5 %    Gran # (ANC) 3.8 1.8 - 7.7 K/uL    Immature Grans (Abs) 0.03 0.00 - 0.04 K/uL    Lymph # 0.6 (L) 1.0 - 4.8 K/uL    Mono # 0.2 (L) 0.3 - 1.0 K/uL    Eos # 0.0 0.0 - 0.5 K/uL    Baso # 0.01 0.00 - 0.20 K/uL    nRBC 0 0 /100 WBC    Gran % 80.9 (H) 38.0 - 73.0 %    Lymph % 13.4 (L) 18.0 - 48.0 %    Mono % 4.7 4.0 - 15.0 %    Eosinophil % 0.2 0.0 - 8.0 %    Basophil % 0.2 0.0 - 1.9 %    Differential Method Automated    Comprehensive metabolic panel    Collection Time: 10/11/22  5:58 AM   Result Value Ref Range    Sodium 137 136 - 145 mmol/L    Potassium 3.3 (L) 3.5 - 5.1 mmol/L    Chloride 110 95 - 110 mmol/L    CO2 20 (L) 23 - 29 mmol/L    Glucose 92 70 - 110 mg/dL    BUN 28 (H) 8 - 23 mg/dL    Creatinine 0.9 0.5 - 1.4  mg/dL    Calcium 8.2 (L) 8.7 - 10.5 mg/dL    Total Protein 6.6 6.0 - 8.4 g/dL    Albumin 2.0 (L) 3.5 - 5.2 g/dL    Total Bilirubin 7.5 (H) 0.1 - 1.0 mg/dL    Alkaline Phosphatase 116 55 - 135 U/L    AST 48 (H) 10 - 40 U/L    ALT 47 (H) 10 - 44 U/L    Anion Gap 7 (L) 8 - 16 mmol/L    eGFR >60 >60 mL/min/1.73 m^2   Magnesium    Collection Time: 10/11/22  5:58 AM   Result Value Ref Range    Magnesium 1.8 1.6 - 2.6 mg/dL   Phosphorus    Collection Time: 10/11/22  5:58 AM   Result Value Ref Range    Phosphorus 2.8 2.7 - 4.5 mg/dL   Haptoglobin    Collection Time: 10/11/22  5:58 AM   Result Value Ref Range    Haptoglobin <10 (L) 30 - 250 mg/dL   Lactate dehydrogenase    Collection Time: 10/11/22  5:58 AM   Result Value Ref Range     (H) 110 - 260 U/L   ISTAT PROCEDURE    Collection Time: 10/11/22 12:27 PM   Result Value Ref Range    POC PH 7.497 (H) 7.35 - 7.45    POC PCO2 26.3 (LL) 35 - 45 mmHg    POC PO2 77 (L) 80 - 100 mmHg    POC HCO3 20.4 (L) 24 - 28 mmol/L    POC BE -3 -2 to 2 mmol/L    POC SATURATED O2 97 95 - 100 %    POC TCO2 21 (L) 23 - 27 mmol/L    Sample ARTERIAL     Site LR    Ammonia    Collection Time: 10/11/22 12:33 PM   Result Value Ref Range    Ammonia 33 10 - 50 umol/L   Sodium    Collection Time: 10/11/22 12:33 PM   Result Value Ref Range    Sodium 136 136 - 145 mmol/L   Platelet count    Collection Time: 10/11/22  3:13 PM   Result Value Ref Range    Platelets 48 (L) 150 - 450 K/uL    MPV 10.9 9.2 - 12.9 fL   Sodium    Collection Time: 10/11/22  9:37 PM   Result Value Ref Range    Sodium 136 136 - 145 mmol/L   CBC auto differential    Collection Time: 10/11/22  9:37 PM   Result Value Ref Range    WBC 4.33 3.90 - 12.70 K/uL    RBC 2.58 (L) 4.60 - 6.20 M/uL    Hemoglobin 7.9 (L) 14.0 - 18.0 g/dL    Hematocrit 22.7 (L) 40.0 - 54.0 %    MCV 88 82 - 98 fL    MCH 30.6 27.0 - 31.0 pg    MCHC 34.8 32.0 - 36.0 g/dL    RDW 16.8 (H) 11.5 - 14.5 %    Platelets 64 (L) 150 - 450 K/uL    MPV 11.3 9.2 - 12.9 fL     Immature Granulocytes CANCELED 0.0 - 0.5 %    Immature Grans (Abs) CANCELED 0.00 - 0.04 K/uL    nRBC 0 0 /100 WBC    Gran % 78.0 (H) 38.0 - 73.0 %    Lymph % 18.0 18.0 - 48.0 %    Mono % 4.0 4.0 - 15.0 %    Eosinophil % 0.0 0.0 - 8.0 %    Basophil % 0.0 0.0 - 1.9 %    Platelet Estimate Decreased (A)     Aniso Slight     Poik Slight     Hypo Occasional     Ovalocytes Occasional     Tear Drop Cells Occasional     Large/Giant Platelets Present     Differential Method Manual    Sodium    Collection Time: 10/11/22 11:56 PM   Result Value Ref Range    Sodium 135 (L) 136 - 145 mmol/L   Comprehensive metabolic panel    Collection Time: 10/12/22  6:23 AM   Result Value Ref Range    Sodium 134 (L) 136 - 145 mmol/L    Potassium 3.7 3.5 - 5.1 mmol/L    Chloride 109 95 - 110 mmol/L    CO2 20 (L) 23 - 29 mmol/L    Glucose 91 70 - 110 mg/dL    BUN 27 (H) 8 - 23 mg/dL    Creatinine 0.7 0.5 - 1.4 mg/dL    Calcium 8.1 (L) 8.7 - 10.5 mg/dL    Total Protein 6.0 6.0 - 8.4 g/dL    Albumin 1.9 (L) 3.5 - 5.2 g/dL    Total Bilirubin 5.8 (H) 0.1 - 1.0 mg/dL    Alkaline Phosphatase 130 55 - 135 U/L    AST 45 (H) 10 - 40 U/L    ALT 56 (H) 10 - 44 U/L    Anion Gap 5 (L) 8 - 16 mmol/L    eGFR >60 >60 mL/min/1.73 m^2   Magnesium    Collection Time: 10/12/22  6:23 AM   Result Value Ref Range    Magnesium 1.8 1.6 - 2.6 mg/dL   Phosphorus    Collection Time: 10/12/22  6:23 AM   Result Value Ref Range    Phosphorus 2.5 (L) 2.7 - 4.5 mg/dL   POCT glucose    Collection Time: 10/12/22 12:09 PM   Result Value Ref Range    POCT Glucose 185 (H) 70 - 110 mg/dL   CBC auto differential    Collection Time: 10/12/22 12:18 PM   Result Value Ref Range    WBC 4.71 3.90 - 12.70 K/uL    RBC 2.60 (L) 4.60 - 6.20 M/uL    Hemoglobin 7.7 (L) 14.0 - 18.0 g/dL    Hematocrit 22.9 (L) 40.0 - 54.0 %    MCV 88 82 - 98 fL    MCH 29.6 27.0 - 31.0 pg    MCHC 33.6 32.0 - 36.0 g/dL    RDW 16.6 (H) 11.5 - 14.5 %    Platelets 52 (L) 150 - 450 K/uL    MPV 11.9 9.2 - 12.9 fL  "  Sodium    Collection Time: 10/12/22 12:18 PM   Result Value Ref Range    Sodium 135 (L) 136 - 145 mmol/L      No results found for: PHENYTOIN, PHENOBARB, VALPROATE, CBMZ      EXAMINATION    VITALS   Vitals:    10/12/22 0522 10/12/22 0748 10/12/22 0857 10/12/22 1205   BP:  (!) 120/59  135/64   BP Location:  Right arm  Left arm   Patient Position:  Lying  Sitting   Pulse:  75  96   Resp:  18  18   Temp:  98.4 °F (36.9 °C)  96.9 °F (36.1 °C)   TempSrc:  Axillary  Oral   SpO2: (!) 94% (!) 93% (!) 93% 96%   Weight:       Height:          CONSTITUTIONAL  General Appearance: NAD, unremarkable, age appropriate, lying in bed, overweight, calm     MUSCULOSKELETAL  Muscle Strength and Tone: generalized weakness / fatigue noted in BUEs and BLEs (slowly improving)  Abnormal Involuntary Movements: none observed   Gait and Station: Attempted but unable to assess due to medical acuity      PSYCHIATRIC   Behavior/Cooperation:  cooperative, eye contact intact, calm  Speech:  normal tone, normal pitch, normal volume, improving rate   Language: grossly intact, able to name and repeat with spontaneous speech  Mood: "better"  Affect:  more full and reactive today   Associations: intermittent CHANDU (improving)  Thought Process: Linear with intermittent confusion (improving)  Thought Content: denies SI, HI, AH, VH, TH, delusions, or paranoia (no RIS observed)  Sensorium: Alert  Alert and Oriented: to person, type of place, name of place, state, month, and year.  He was disoriented to city and situation.   Memory: 3/3 immediate, 0/3 at 5 minutes               Recent:  Impaired / Limited ; able to report intermittent recent events              Remote:  Impaired / Limited / Improving; Named 2/4 past presidents   Attention/concentration: Limited / Improving. Able to spell [id number] but NOT [id number].   Similarities: Intact (difference between apple and orange?)  Abstract reasoning: Impaired / Limited  Fund of Knowledge: Named 2/4 past " presidents   Insight: Limited / Improving   Judgment: Limited / Improving      CAM ICU Delirium Assessment - NEGATIVE      MOCA - Patient was unwilling / unable to participate in a MOCA today despite multiple attempts.      Is the patient aware of the biomedical complications associated with substance abuse and mental illness? Yes         MEDICAL DECISION MAKING     ASSESSMENT      Acute Metabolic Encephalopathy (improving)   Delirium due to Medical Condition with Behavioral Disturbance (improving)  Alcohol Use Disorder, Severe, Dependence (pt denies intake in 5 months)   (Rule out dementia with behavioral disturbance)     RECOMMENDATIONS       - Patient does not currently meet PEC criteria due to not being an imminent threat to self/others and not being gravely disabled 2/2 mental illness at this time.       Implement / Continue the below DELIRIUM BEHAVIOR MANAGEMENT:  - Minimize use of restraints; if physical restraints necessary, please utilize medical/chemical prns for agitation (can use LOW DOSE Zyprexa 2.5 mg PO/IM q8 hours PRN) (discussed risks/benefits/alt vs no treatment with patient).  - Keep shades open and room lit during day and room dim at night in order to promote healthy circadian rhythms.  - Encourage family at bedside.  - Keep whiteboard in patient's room current with the date and name of the members of patient's team for easy patient self re-orientation.  - Avoid benzodiazepines, antihistamines, anticholinergics, hypnotics, and minimize opiates while controlling for pain as these medications may exacerbate delirium.      - AVOID PRN Haldol at this time due to excessive morning somnolence when given previously overnight.      - Continue to provide folate / thiamine / multi-vitamin supplementation given hx of alcohol abuse (discussed risks/benefits/alt vs no treatment with patient).     - Psychotherapy was again performed with patient as noted above with a focus on improving orientation, confusion,  "behavioral modification, and alcohol cessation / continued total sobriety.     - Patient's most recent labs, imaging, procedures, studies, and EKG were reviewed today ; UDS negative on admission ; ethanol < 10 on admission ; PETH < 10 on 10/06/22 ; covid negative ; TSH wnl ; HIV and RPR non-reactive ; ammonia wnl ; B12 and Folate wnl from 09/24/22 ; MRI Brain on 10/09/22 with "brain parenchymal volume loss and prominence of the CSF spaces.  There is encephalomalacia in the left frontal parietal lobes, compatible with a remote infarction." ; EEG from 10/07/22 with "moderate/severe degree of diffuse slowing and generalized triphasic waves.  These findings are consistent with a diffuse disturbance of cerebral function or encephalopathy secondary to metabolic, toxic, infectious, inflammatory, or other systemic processes. No potentially epileptogenic discharges or seizure activity are present during the recording." ; QTc was 496 from EKG on 10/08/22.   Repeat EKG on 10/11/2022 with QTc of 293.     - Despite counseling, education, and motivational interviewing, the patient is not interested in residential alcohol rehab, IOP treatment, outpatient MH / addiction treatment, MAT, or AA/NA meetings.     - Patient was instructed to call 911 and/or [phone] and return to the nearest ED if he begins feeling suicidal, homicidal, or gravely disabled (for s/p this hospitalization).      - Thank you for this consult ; will continue to follow patient         Total time spent with patient and/or managing/coordinating patient's care today (excluding the time spent on psychotherapy): 41 minutes   Time spent on psychotherapy today (as noted above): 21 minutes   Total time for encounter today including psychotherapy: 62 minutes      More than 50% of the time was spent counseling/coordinating care.     Consulting clinician was informed of the encounter and consult note.      STAFF:  Perico Botello MD  [facility]  10/12/2022   "

## 2022-10-12 NOTE — PROGRESS NOTES
[facility] Progress Note    Author Rafael Lawson  1950  [id number]      Subjective:   Patient is a 71 y.o. male who was admitted on 10/5/2022 for syncope and encephalopathy. Neurology consulted for concern for new stroke.     Today the patient states that he is feeling better today although drowsy. MRI C/ T spine with mild canal stenosis confounded with motion abnormalities, unable to tolerate MRI L spine.  No more agitation episodes overnight. Improved mentation and movement in upper and lower extremities.    ROS: speech problems, Generalized and LE weakness    Objective:  Vitals:    10/12/22 1205   BP: 135/64   Pulse: 96   Resp: 18   Temp: 96.9 °F (36.1 °C)     Scheduled Meds:   balsam peru-castor oiL   Topical (Top) BID    famotidine  20 mg Oral BID    folic acid  1 mg Oral Daily    multivitamin  1 tablet Oral Daily    predniSONE  40 mg Oral Daily    thiamine (VITAMIN B1) IVPB  250 mg Intravenous TID       Neurologic Physical Examination:   Orientation  Somnolent but arousable, oriented to name, place, month. States he drove here (found down)  Language  Dysarthric but improved from yesterday.   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  Motor  5/5 strength in UE  2/5 strength in LE  +1 reflexes and symmetric throughout   No clonus   Negative Dodge sign bilaterally  Sensory  Normal to light touch and pinprick throughout   Cerebellar/Gait  Normal finger to nose   Heel-to-shin could not be assessed    Laboratory Findings:   Platelets 52  CPK : 134 ( NL)    Neuroimaging:   CT Head Without Contrast    Result Date: 10/5/2022  EXAMINATION: CT HEAD WITHOUT CONTRAST CLINICAL HISTORY: Head trauma, minor (Age >= 65y); TECHNIQUE: Low dose axial images were obtained through the head.  Coronal and sagittal reformations were also performed. Contrast was not  administered. COMPARISON: None. FINDINGS: Blood: No acute intracranial hemorrhage. Parenchyma: No definite loss of gray-white differentiation to suggest acute or subacute transcortical infarct. Left frontal and parietal lobe encephalomalacia and gliosis.  Elsewhere, generalized pattern age-related volume loss.  Additional nonspecific areas of white matter hypoattenuation could relate to sequela of chronic small vessel ischemic disease. Ventricles/Extra-axial spaces: No abnormal extra-axial fluid collection. Basal cisterns are patent. Vessels: Atherosclerosis Orbits: Grossly unremarkable. Scalp: Question left posterolateral and right posterior scalp soft tissue contusions. Skull: There are no depressed skull fractures or destructive bone lesions. Sinuses and mastoids: Scattered relatively minor paranasal sinus mucosal thickening retention cysts. Other findings: None     1. No acute intracranial findings. 2. Left frontoparietal encephalomalacia gliosis for which clinical correlation recommended. Electronically signed by: Aniceto Bob Date:    10/05/2022 Time:    14:39    CTA Neck    Result Date: 10/8/2022  EXAMINATION: CTA NECK CLINICAL HISTORY: Ataxia, cervical trauma;Ams; TECHNIQUE: CT angiogram was performed from the level of the dat to the EAC following the IV administration of 100mL of Omnipaque 350.   Sagittal and coronal reconstructions and maximum intensity projection reconstructions were performed. Arterial stenosis percentages are based on NASCET measurement criteria. COMPARISON: MRI brain 10/05/2022 MRA head 10/07/2022 FINDINGS: There is no significant stenosis at the origin of the vessels from the aortic arch or at the origin of the vertebral arteries from the subclavian arteries. There are calcifications at the carotid bifurcations bilaterally.  There is no significant stenosis at the right carotid bifurcation by NASCET criteria.  The left carotid bifurcation is identified at the C3-4 level with  approximately 60% stenosis at the distal carotid bulb by NASCET criteria. Evaluation of the vertebral arteries demonstrates the left to be dominant with mild narrowing intracranially with atherosclerotic calcification.  The right vertebral artery demonstrates marked narrowing at the C1 level and becomes critically narrowed versus occluded as it enters the foramen magnum. Evaluation of the visualized portions of the [id number] demonstrates calcifications with mild narrowing of the cavernous ICA bilaterally.  No major branch stenosis of the anterior circulation is identified.  There is mild stenosis however of the proximal right PCA. No evidence of acute cervical fracture. No soft tissue mass is identified in the neck.  A mildly enlarged 16 mm left supraclavicular lymph node is identified in the region of Virchow's  node, nonspecific.     No significant stenosis at the right carotid bifurcation by NASCET criteria.  Approximately 60% stenosis at the left carotid bifurcation. The left vertebral artery is dominant with mild narrowing intracranially.  The right vertebral artery is narrowed and irregular at the C1 level and is critically narrowed/occluded as it enters the foramen magnum.  This may be atherosclerotic unless there is clinical suspicion of underlying dissection, in light of the history of reported neck trauma. Limited intracranial valuation demonstrates mild narrowing of the right posterior cerebral artery. A mildly enlarged 16 mm left supraclavicular lymph node is identified in the region of Virchow's node, nonspecific. Electronically signed by: Steven Israel Date:    10/08/2022 Time:    17:17    CTA Chest Non-Coronary (PE Studies)    Result Date: 9/23/2022  EXAMINATION: CTA CHEST NON CORONARY (PE STUDIES) CLINICAL HISTORY: Pulmonary embolism (PE) suspected, high prob; TECHNIQUE: Low dose axial images, sagittal and coronal reformations were obtained from the thoracic inlet to the lung bases following  the IV administration of 100 mL of Omnipaque 350.  Contrast timing was optimized to evaluate the pulmonary arteries.  MIP images were performed. COMPARISON: None FINDINGS: Pulmonary arteries:Pulmonary arteries are adequately enhanced.No filling defects to indicate pulmonary embolism. Thoracic soft tissues: Unremarkable. Aorta: Left-sided aortic arch.  No aneurysm or dissection. Heart: Normal size. No effusion. Stephanie/Mediastinum: Mild mediastinal adenopathy the paratracheal region, AP window and subcarinal regions.  Mild bilateral hilar adenopathy. Bilateral mild axillary adenopathy. Airways: Patent. Lungs/Pleura: Clear lungs. No pleural effusion or thickening.  Few small scattered emphysematous blebs. Esophagus: Unremarkable. Upper Abdomen: The spleen is enlarged measuring greater than 18 cm.  The liver is likely enlarged though incompletely visualized. Mild upper mesenteric adenopathy Bones: No acute fracture. No suspicious lytic or sclerotic lesions.     1. No evidence of pulmonary embolism 2. Mild nonspecific adenopathy involving the mediastinum, bilateral hilum, bilateral axilla and upper abdomen.  Lymphoma or metastatic disease would be a consideration.  Follow-up recommended. 3. Hepatosplenomegaly. 4.  This report was flagged in Epic as abnormal. Electronically signed by: Tobin Stevens Date:    09/23/2022 Time:    17:43    MRA Brain without contrast    Result Date: 10/7/2022  EXAMINATION: MRA BRAIN WITHOUT CONTRAST CLINICAL HISTORY: ams; TECHNIQUE: 3D time-of-flight noncontrast MR angiogram of the intracranial vasculature with multiple MIP reconstructions. Examination significant degraded by patient motion, despite a repeat acquisition. COMPARISON: None FINDINGS: The visualized internal carotid arteries are normal in course and caliber. Basilar artery normal in caliber. The proximal ACAs, MCAs and PCAs appear within normal limits. No high-grade stenosis, large vessel occlusion, or intracranial aneurysm  identified.     Motion degraded examination, without compelling evidence of high-grade stenosis or large vessel occlusion. Electronically signed by: eTrrence Longoria MD Date:    10/07/2022 Time:    13:15    MRI Brain Without Contrast    Result Date: 10/5/2022  EXAMINATION: MRI BRAIN WITHOUT CONTRAST CLINICAL HISTORY: AMS; TECHNIQUE: Multiplanar multisequence MR imaging of the brain was performed without intravenous contrast. COMPARISON: CT head from earlier the same date. FINDINGS: There is no evidence of restricted diffusion to suggest an acute infarction. Ventricles are normal in size for age without evidence of hydrocephalus.  There is left frontoparietal encephalomalacia, likely related to a remote infarction.  There are calcifications along the posterior falx.  There are T2/FLAIR hyperintensities in the supratentorial white matter, compatible with mild chronic microvascular ischemic changes.  No mass, hemorrhage, or recent or remote major vascular distribution infarct.  No extra-axial blood or fluid collections. Normal vascular flow voids. Bone marrow signal intensity is normal. Paranasal sinuses and mastoid air cells are clear.     No acute intracranial abnormality. Left frontoparietal encephalomalacia. Electronically signed by: Mayank Ontiveros Date:    10/05/2022 Time:    18:42    MRI Brain W WO Contrast    Result Date: 10/10/2022  EXAMINATION: MRI BRAIN W WO CONTRAST CLINICAL HISTORY: please look for meningeal enhancements; TECHNIQUE: Multiplanar, multisequence MR imaging of the brain was performed before and after the administration of 10 mL Gadavist intravenous contrast. COMPARISON: MRI brain from 10/05/2022.  CTA neck from 10/08/2022. FINDINGS: Examination is limited by motion artifact. There is no evidence of restricted diffusion to suggest an acute infarction. There is brain parenchymal volume loss and prominence of the CSF spaces.  No hydrocephalus.  There is encephalomalacia in the left frontal parietal  lobes, compatible with a remote infarction.  No mass, hemorrhage, or recent or remote major vascular distribution infarct.  No extra-axial blood or fluid collections. Normal vascular flow voids. There is no abnormal pachymeningeal or leptomeningeal enhancement. Bone marrow signal intensity is normal. Paranasal sinuses and mastoid air cells are clear.     Limited examination due to motion artifact.  No acute intracranial abnormality or evidence of meningeal enhancement. Electronically signed by: Mayank Ontiveros Date:    10/10/2022 Time:    21:02    Echo    Result Date: 9/25/2022  · Concentric hypertrophy and normal systolic function. · The estimated ejection fraction is 60%. · Normal left ventricular diastolic function. · Normal right ventricular size with normal right ventricular systolic function. · Mild left atrial enlargement. · There is mild aortic valve stenosis. · Aortic valve area is 2.05 cm2; peak velocity is 2.51 m/s; mean gradient is 15 mmHg. · Mild tricuspid regurgitation. · Intermediate central venous pressure (8 mmHg). · The estimated PA systolic pressure is 24 mmHg. · Trivial pericardial effusion.      MRI C/T/L Spine 10/8/2022  Cervical spine:     Satisfactory alignment.     Mild chronic appearing height loss of the C3, C4, and C5 vertebral bodies.  No acute fracture or marrow replacement process detected.     Multilevel disc space height loss most prominent C5-C6 and C6-C7.  Multilevel disc desiccation.     Spinal cord is obscured and not well evaluated.     Degenerative findings:     C2-C3: Left facet arthropathy.  No significant spinal canal stenosis.  Mild left neural foraminal narrowing.     C3-C4: Posterior disc osteophyte complex, uncovertebral spurring, and right facet arthropathy.  No significant spinal canal stenosis.  Moderate bilateral neural foraminal narrowing.     C4-C5: Posterior disc osteophyte complex, uncovertebral spurring, and facet arthropathy.  Mild spinal canal stenosis.   Moderate left neural foraminal narrowing.     C5-C6: Posterior disc osteophyte complex, uncovertebral spurring, and facet arthropathy.  Moderate spinal canal stenosis.  Moderate/severe bilateral neural foraminal narrowing.     C6-C7: Posterior disc osteophyte complex and facet arthropathy.  Mild effacement of ventral thecal sac.  Moderate bilateral neural foraminal narrowing.     C7-T1: No significant spinal canal stenosis or neural foraminal narrowing.     Thoracic spine:     Satisfactory alignment.     Vertebral body heights are within normal limits.  No acute fracture or marrow replacement process detected.     Multilevel mild disc space height loss.     Spinal cord is obscured and not well evaluated.     Multilevel mild posterior disc protrusions.  No significant spinal canal stenosis.     Mild subcutaneous edema of the posterior paraspinous soft tissues.     Impression:     Limited examination due to significant motion artifact.     Multilevel degenerative changes of the cervical spine.  Most prominent level at C5-C6 with moderate spinal canal stenosis.  Multilevel variable neural foraminal narrowing, as above.    Assessment/Plan:   Author Hall  is a 71 y.o. right handed male, with pancytopenia, diffuse lymphadenopathy of unknown significance and anemia is here for the evaluation of syncope and acute encephalopathy, which was thought to be 2/2 dehydration/low intravascular volume with Hg of 6ish vs cardiogenic syncope vs seizures. Patient does have L frontoparietal encephalomalacia and that puts him at risk of seizures; however, we suspect that dehydration, anemia, UTI and  metabolic derangements could be main contributors here. For better assessment CTA head and neck were obtained, official read suggests 60% L carotid bifurcation stenosis, and R vert narrowed and irregular at the C1 with underlying clinical suspicion of dissection., given the recent fall. EEG brain showed mod-severe diffuse slowing and  gen triphasic waves suggestive of toxic/infectious/metabolic derangements. No seizures or epileptogenic focus were seen. Patient is oriented to self, place, and follows commands in all 4 extremities with out any lag. Repeat MRI W & WO contrast showed not acute intracranial abnormality or evidence of meningeal enhancement.Vascular Neurology reevaluated vessel imaging with indications for medical management. Acute symptoms of presentation likely related to pancytopenia, metabolic abnormalities, deconditioning and severe illness.      Recommendations:   -ASA 81mg with Plavix 75mg - if and when thrombocytopenia improves  -Atorvastatin 40mg daily for goal LDL < 70 in setting of atherosclerotic disease  - Continue MVI and thiamine supplementation  -PT/OT/SLP evaluate and treat   -No further recommendations per neurology   - Will sign off for now, please consult us with any new neurologic concerns/findings.       Thank you for the consult.   Case discussed with Dr. Garg.     Adama Nava DO   [facility] PGY-1

## 2022-10-12 NOTE — PT/OT/SLP PROGRESS
Physical Therapy Treatment    Patient Name:  Author Rafael Lawson   MRN:  [id number]    Recommendations:     Discharge Recommendations:   (post acute placement)   Discharge Equipment Recommendations:  (TBD)   Barriers to discharge:  decreased mobility,strength and endurance    Assessment:     Author Rafael Lawson is a 71 y.o. male admitted with a medical diagnosis of Syncope and collapse.  He presents with the following impairments/functional limitations:  weakness, impaired endurance, impaired functional mobility, impaired balance, decreased upper extremity function, decreased lower extremity function, pain, decreased safety awareness, impaired joint extensibility,pt with good participation requiring increased time with mobility,pt requires assistance with all mobility at this time and will benefit from post acute placement upon discharge.    Rehab Prognosis: Fair; patient would benefit from acute skilled PT services to address these deficits and reach maximum level of function.    Recent Surgery: * No surgery found *      Plan:     During this hospitalization, patient to be seen 5 x/week to address the identified rehab impairments via gait training, therapeutic activities, therapeutic exercises, neuromuscular re-education and progress toward the following goals:    Plan of Care Expires:  11/09/22    Subjective     Chief Complaint: n/a  Patient/Family Comments/goals: pt fatigued post rx.  Pain/Comfort:  Pain Rating 1:  (no rating)  Location - Side 1: Right  Location - Orientation 1: lateral  Location 1: thigh  Pain Addressed 1: Reposition, Distraction, Cessation of Activity      Objective:     Communicated with nsg prior to session.  Patient found supine with bed alarm, Condom Catheter, PICC line, pressure relief boots, telemetry upon PT entry to room.     General Precautions: Standard, fall   Orthopedic Precautions:N/A   Braces: N/A  Respiratory Status: Room air     Functional Mobility:  Bed Mobility:     Supine to Sit:  moderate assistance  Sit to Supine: moderate assistance and of 2 persons  Transfers:     Sit to Stand:  minimum assistance, of 2 persons, and X 2 trials with rolling walker  Balance: poor standing balance      AM-PAC 6 CLICK MOBILITY  Turning over in bed (including adjusting bedclothes, sheets and blankets)?: 2  Sitting down on and standing up from a chair with arms (e.g., wheelchair, bedside commode, etc.): 2  Moving from lying on back to sitting on the side of the bed?: 2  Moving to and from a bed to a chair (including a wheelchair)?: 2  Need to walk in hospital room?: 1       Therapeutic Activities and Exercises: pt took 2 sidesteps to HOB with RW and Mod A X 2,rest periods throughout rx.       Patient left supine with all lines intact, call button in reach, bed alarm on, and nsg notified..    GOALS: see general POC  Multidisciplinary Problems       Physical Therapy Goals          Problem: Physical Therapy    Goal Priority Disciplines Outcome Goal Variances Interventions   Physical Therapy Goal     PT, PT/OT Ongoing, Progressing     Description: Goals to be met by: 22     Patient will increase functional independence with mobility by performin. Supine to sit with Moderate Assistance  2. Sit to supine with Moderate Assistance  3. Sit to stand transfer with Moderate Assistance  4. Bed to chair transfer with Moderate Assistance using least restrictive assistive device  5. Gait  x 25 feet with Moderate Assistance using least restrictive assistive device.                          Time Tracking:     PT Received On: 10/12/22  PT Start Time: 1129     PT Stop Time: 1157  PT Total Time (min): 28 min     Billable Minutes: Therapeutic Activity 17 and Therapeutic Exercise 11       PT/PTA: PTA     PTA Visit Number: 1     10/12/2022

## 2022-10-12 NOTE — PT/OT/SLP PROGRESS
Occupational Therapy   Treatment    Name: Author Rafael Lawson  MRN: [id number]  Admitting Diagnosis:  Syncope and collapse       Recommendations:     Discharge Recommendations: other (see comments) (post acute placement)  Discharge Equipment Recommendations:  other (see comments) (TBD)  Barriers to discharge:  Inaccessible home environment, Decreased caregiver support    Assessment:     Author Rafael Lawson is a 71 y.o. male with a medical diagnosis of Syncope and collapse.  He presents with ..The primary encounter diagnosis was FRANK (acute kidney injury). Diagnoses of Intravascular volume depletion, AMS (altered mental status), Shortness of breath, Syncope, cardiogenic, Syncope and collapse, Tachycardia, Somnolence, Occlusion and stenosis of right vertebral artery, and At risk for long QT syndrome were also pertinent to this visit.  . Performance deficits affecting function are weakness, visual deficits, impaired endurance, impaired cognition, impaired sensation, decreased coordination, impaired self care skills, decreased upper extremity function, impaired functional mobility, decreased lower extremity function, gait instability, decreased safety awareness, impaired balance, impaired cardiopulmonary response to activity.     Tolerated treatment, cotreatment with PT. Patient progressed toward LE dressing performance on this date, but with quick fatigue upon two transitional sit to stand movements with assist of 2.  Continued recommendation for post acute placement.    Rehab Prognosis:  Fair; patient would benefit from acute skilled OT services to address these deficits and reach maximum level of function.       Plan:     Patient to be seen 3 x/week to address the above listed problems via self-care/home management, therapeutic activities, therapeutic exercises  Plan of Care Expires: 11/11/22  Plan of Care Reviewed with: patient    Subjective     Pain/Comfort:  Pain Rating 1: other (see comments) (denies pain at  rest)  Pain Addressed 1: Pre-medicate for activity, Nurse notified  Pain Rating Post-Intervention 1: other (see comments) (indicates fatigue not pain after minimal movement)    Objective:     Communicated with: nursing prior to session.  Patient found HOB elevated with peripheral IV, PICC line, pressure relief boots, bed alarm, telemetry , REJI system upon OT entry to room.    General Precautions: Standard, fall   Orthopedic Precautions:N/A   Braces: N/A  Respiratory Status: Room air     Occupational Performance:     Bed Mobility:    Patient completed Supine to Sit with moderate assistance of 1, extra time  Patient completed Sit to Supine with moderate assistance of 2 persons, assist for trunk and LE management    Functional Mobility/Transfers:  Patient completed Sit <> Stand Transfer with two persons  with  rolling walker   Functional Mobility: Patient with insistence for bed to be elevated (would not attempt sit to stand without elevated position). Two sit to stand attempts performed. Trial one: bed elevated: min assist of 2; trial two bed elevated: min assist of 2 (clinical inference: mod assist of 1 but with elevated bed; likely max assist of 1 to mod assist of 2 from standard height. Tolerated 2 side steps laterally with moderate assist of 2 and management of walker    Activities of Daily Living:  Feeding:  verbal cueing for initiation to facilitate performance of set up;  patient with necessity of cueing to facilitate action of attempting to set up own tray  Lower body dressing: doff/don L sock via figure four method with cues for placement of thigh on bed for support; extensive time needed; therapist donned R sock with disinterest noted from patient; dynamic sitting fair +      AMPAC 6 Click ADL: 14    Treatment & Education:  Patient agreeable to session, although with min excessive talking not relevant to scenario. Alert and oriented to self and recognizing being in the hospital. Patient also oriented to  month and year but not date. Patient guided for bed mobility, transition movement to eob, followed by two trials of sit to stand with tolerance for standing for < 45 seconds each time, seated rest break between tasks before two lateral steps toward HOB. Patient with self-facilitatory return to seated as well as with presentation of increased breathlessness (although SpO2 on room air in upper 95-98%, and hr in upper 70s). Cued to perform pursed lip breathing with fair followthrough. Instructed for participation in set up of own meal as noted above.     *Therapist alerted RN who entered room to assess patient regarding dark reddish color in weaver bag    Patient left with bed in chair position with all lines intact, call button in reach, bed alarm on, and nursing notified    GOALS:   Multidisciplinary Problems       Occupational Therapy Goals          Problem: Occupational Therapy    Goal Priority Disciplines Outcome Interventions   Occupational Therapy Goal     OT, PT/OT Ongoing, Progressing    Description: Goals to be met by: 11/08/2022      Patient will increase functional independence with ADLs by performing:    UE Dressing with Modified [place].  LE Dressing with Modified [place].  Grooming while standing with Modified [place].  Toileting from toilet with Modified [place] for hygiene and clothing management.   Toilet transfer to toilet with Modified [place].  Increased functional strength to WFL for self care.  Upper extremity exercise program x10 reps per handout, with supervision.                          Time Tracking:     OT Date of Treatment: 10/12/22  OT Start Time: 1129  OT Stop Time: 1157  OT Total Time (min): 28 min ; co tx with PT due to complex nature of patient and for safety with mobility to decrease fall risk for patient and caregiver injury requiring two skilled therapists to provide different interventions.      Billable Minutes:Self Care/Home Management 11 min  Therapeutic  Activity 17 min    OT/JONI: OT     JONI Visit Number: 1    10/12/2022

## 2022-10-12 NOTE — PLAN OF CARE
"[facility] - Telemetry  Discharge Final Note    Primary Care Provider: Primary Doctor No    Expected Discharge Date: 10/11/2022    Pharmacist will go over home medications and reasons for medications. VN and bedside nurse to reiterate final discharge instructions.       Final Discharge Note (most recent)       Final Note - 10/12/22 0914          Final Note    Assessment Type Final Discharge Note (P)      Anticipated Discharge Disposition Skilled Nursing Facility (P)      Hospital Resources/Appts/Education Provided Appointments scheduled and added to AVS;Post-Acute resouces added to AVS (P)         Post-Acute Status    Post-Acute Authorization Placement (P)      Post-Acute Placement Status Set-up Complete/Auth obtained (P)    Orders sent to facility to review. PENDING ORDER ACCEPTANCE AND REPORT INFORMATION.    Discharge Delays PFC Arranged Transportation (P)    Transporation request for 1pm pickup.                  Future Appointments   Date Time Provider Department Center   10/13/2022 11:20 AM Fransisco Raines MD [facility] HEM ONC [facility]   12/8/2022  9:30 AM Trista Son NP [facility] NEURO [place]     BP (!) 120/59 (BP Location: Right arm, Patient Position: Lying)   Pulse 75   Temp 98.4 °F (36.9 °C) (Axillary)   Resp 18   Ht 6' 1" (1.854 m)   Wt 102.8 kg (226 lb 10.1 oz)   SpO2 (!) 93%   BMI 29.90 kg/m²       Contact Info       [facility]   Specialty: Skilled Nursing Facility    [address] [address]   Phone: [phone]       Next Steps: Follow up    Fransisco Raines MD   Specialty: Hematology and Oncology    [address] [place] [address]   Phone: [phone]       Next Steps: Follow up             Medication List        START taking these medications      famotidine 20 MG tablet  Commonly known as: PEPCID  Take 1 tablet (20 mg total) by mouth 2 (two) times daily.     folic acid 1 MG tablet  Commonly known as: FOLVITE  Take 1 tablet (1 mg total) by mouth once " daily.     multivitamin Tab  Take 1 tablet by mouth once daily.     polyethylene glycol 17 gram/dose powder  Commonly known as: GLYCOLAX  mix 17 g ( 1 capful ) in 8 ounces of liquid and drink  by mouth 3 (three) times daily as needed.     predniSONE 20 MG tablet  Commonly known as: DELTASONE  Take 2 tablets (40 mg total) by mouth once daily for 7 days, THEN 1.5 tablets (30 mg total) once daily for 7 days, THEN 1 tablet (20 mg total) once daily for 7 days, THEN 0.5 tablets (10 mg total) once daily for 7 days.  Start taking on: October 12, 2022     thiamine 100 MG tablet  Take 1 tablet (100 mg total) by mouth once daily.            STOP taking these medications      diclofenac sodium 1 % Gel  Commonly known as: VOLTAREN               Where to Get Your Medications        These medications were sent to [facility] [address] [id number] [id number]      Hours: Mon-Fri, 8a-5:30p Phone: [phone]   famotidine 20 MG tablet  folic acid 1 MG tablet  multivitamin Tab  polyethylene glycol 17 gram/dose powder  predniSONE 20 MG tablet  thiamine 100 MG tablet

## 2022-10-12 NOTE — PLAN OF CARE
Tolerated treatment, cotreatment with PT. Patient progressed toward LE dressing performance on this date, but with quick fatigue upon two transitional sit to stand movements with assist of 2. See detailed note to follow.    Continued recommendation for post acute placement.    Problem: Occupational Therapy  Goal: Occupational Therapy Goal  Description: Goals to be met by: 11/08/2022      Patient will increase functional independence with ADLs by performing:    UE Dressing with Modified [place].  LE Dressing with Modified [place].  Grooming while standing with Modified [place].  Toileting from toilet with Modified [place] for hygiene and clothing management.   Toilet transfer to toilet with Modified [place].  Increased functional strength to WFL for self care.  Upper extremity exercise program x10 reps per handout, with supervision.     Outcome: Ongoing, Progressing

## 2022-10-12 NOTE — PLAN OF CARE
Problem: Physical Therapy  Goal: Physical Therapy Goal  Description: Goals to be met by: 22     Patient will increase functional independence with mobility by performin. Supine to sit with Moderate Assistance  2. Sit to supine with Moderate Assistance  3. Sit to stand transfer with Moderate Assistance  4. Bed to chair transfer with Moderate Assistance using least restrictive assistive device  5. Gait  x 25 feet with Moderate Assistance using least restrictive assistive device.     Outcome: Ongoing, Progressing

## 2022-10-12 NOTE — PLAN OF CARE
Discharge orders noted. AVS prepared with medication list, importance of medication compliance, follow up appointments, diet, home care instructions, treatment plan, self management, and when to seek medical attention. Detailed clinical reference list attached. Bedside nurse to print AVS and place in discharge packet for accepting facility.

## 2022-10-13 ENCOUNTER — PATIENT OUTREACH (OUTPATIENT)
Dept: ADMINISTRATIVE | Facility: CLINIC | Age: 72
End: 2022-10-13
Payer: MEDICARE

## 2022-10-13 LAB
BACTERIA BLD CULT: NORMAL
BACTERIA BLD CULT: NORMAL

## 2022-10-14 ENCOUNTER — TELEPHONE (OUTPATIENT)
Dept: HEMATOLOGY/ONCOLOGY | Facility: CLINIC | Age: 72
End: 2022-10-14
Payer: MEDICARE

## 2022-10-14 DIAGNOSIS — D69.6 THROMBOCYTOPENIA: ICD-10-CM

## 2022-10-14 DIAGNOSIS — D64.9 NORMOCYTIC ANEMIA: Primary | ICD-10-CM

## 2022-10-14 LAB
BACTERIA BLD CULT: NORMAL
BACTERIA BLD CULT: NORMAL

## 2022-10-14 NOTE — PROGRESS NOTES
During hospitalization, direct antiglobulin test was positive and haptoglobin is decreased, suggesting a component of hemolytic anemia. He missed follow-up appointment on 10/13/22. We will schedule labs and appt on 10/27/22.    Fransisco Raines M.D.  Hematology/Oncology  [facility] - [address], [address]  [place] [address]  Phone: [phone]  Fax: [phone]

## 2022-10-24 NOTE — DISCHARGE SUMMARY
[facility] Medicine  Discharge Summary      Patient Name: Author Rafael Lawson  MRN: [id number]  Patient Class: IP- Inpatient  Admission Date: 10/5/2022  Hospital Length of Stay: 6 days  Discharge Date and Time: 10/12/2022  1:40 PM  Attending Physician: Xin att. providers found   Discharging Provider: Saeed Reyes NP  Primary Care Provider: Primary Doctor No      HPI:   71-year-old male, homeless, unknown medical history, brought to the ED by EMS for altered mental status.  Patient is confused about his situation he alert and oriented to self and place.  Per nursing chart, patient was found in the hotel bathroom on the floor, covered in urine and feces.  Patient doesn't remember leading events, denied chest pain or dizziness prior to, no n/v. Says that he might have a fallen and woke up in a hotel bed then EMS brought him in here. Reports shortness of breath, bilaterally lower extremities weakness, unable to move them without assistant.  Denies chest pain, abdominal pain, nausea/vomiting, no alcohol or drug uses. [facility] ER MD.       * No surgery found *      Hospital Course:   No notes on file     Goals of Care Treatment Preferences:  Code Status: DNR    Health care agent: none identified to date  Health care agent number: none identified to date          What is most important right now is to focus on spending time at home, avoiding the hospital, remaining as independent as possible, improvement in condition but with limits to invasive therapies, comfort and QOL .  Accordingly, we have decided that the best plan to meet the patient's goals includes continuing with treatment.      Consults:   Consults (From admission, onward)        Status Ordering Provider     Inpatient consult to Psychiatry  Once        Provider:  (Not yet assigned)    Completed ALEENA ABDI     Inpatient consult to Non Acute Stroke  Once        Provider:  (Not yet assigned)    Completed ALEENA ABDI      Inpatient consult to Palliative Care  Once        Provider:  (Not yet assigned)    Completed ALEENA ABDI     Inpatient consult to Hematology/Oncology  Once        Provider:  (Not yet assigned)    Completed ALEENA ABDI     Inpatient consult to Cardiology-[facility]  Once        Provider:  (Not yet assigned)    Completed ALEENA ABDI     Inpatient consult to [facility]  Once        Provider:  (Not yet assigned)    Completed JE SALOMON          * Syncope and collapse-resolved as of 10/12/2022  Tele  Cycle CE troponin elevated, however cardiology states Low suspicion for cardiac etiology      Had a recent echo 9/2022 HFpEF  Fall precautions     Per neurology recs: MRI C/T/L spine for debility and urinary incontinence     Apnea    Sleep apnea undiagnosed or r/t haldol   -CPAP, continuous pulse ox   -ABG, CXR     -will consult consult psych to assist with mood stabilization without sedation     Occlusion and stenosis of right vertebral artery    No intervention per vascular sx   -DAPT when platelets normalize     UTI (urinary tract infection)    -follow urine cx  -add rocephin    10/9 abx escalated with fevers. This is likely FOUO or possibly r/t blood transfusions- UTI likely not the culprit of AMS  -may stop or deescalate abx soon  -follow urine and blood cx     10/11 stop abx     Encephalopathy, metabolic    See AMS     Discharge planning issues  Pt appeared disheveled at POC he's reported to be living in a hotel room and likely is abusing etoh.   May benefit from placement. SW for DC planning    -patient's nephew would like him to be in a nursing home in [place] with Hospice.   -Patient is now DNR.   10/10 Son Morgan, Nephew Filipe at bedside. Given patient clinical status, plan of care, they voiced understanding, all questions answered. Family involved in decision making.     10/11 CM attempting SNF, hospice or IP hospice - is accepted at [facility] for  SNF    Transaminitis  Mild elevation AST/ALT WNL, Tbili 3.5  AST elevated I suspect from etoh abuse   -will order liver US:No acute abnormality or biliary ductal dilation.     and hepatitis panel (negative)   -repeat ammonia level WNL        Intravascular volume depletion  Continue IVF/PO hydration     AMS (altered mental status)  He denies eating and drinking well and has not been taking care of himself. Not sure of the validity of this. Family reports ETOH abuse 6 months ago.  MRI w/o acute infarct, but shows Left frontoparietal encephalomalacia. . Neuro checks and neuro c/s  UDS (negative) and etoh level (negative), PETH (negative)  PT/OT/ST/SW  -neurology on board   - EEG to assess for nonepileptic seizure activity:  moderate/severe degree of diffuse slowing and generalized triphasic waves.  These findings are consistent with a diffuse disturbance of cerebral function or encephalopathy secondary to metabolic, toxic, infectious, inflammatory, or other systemic processes. No potentially epileptogenic discharges or seizure activity are present during the recording.   -ammonia (WNL) RPR (nonreactive), HIV (nonreactive), ABG (mildly hypoxic, added supplemental O2)   -CIWA q 4 hours with PRN ativan if he is withdrawing from ETOH - stopped   -UA with rare bacteria- started IV rocephin, abx escalated with fevers, now no fevers since 10/8  - stop abx, no source of infection found   -MRA head: Motion degraded examination, without compelling evidence of high-grade stenosis or large vessel occlusion.   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 discussed with  Vascular neurology who does not recommend  Intervention due to no acute stroke and would medically manage. Cannot give antiplatelets at this time due to pancytopenia.   -will discuss LP with neurology - neuro recs: no LP at this time, official consult to vascular neurology, MRI brain w w/o to assess for meningeal enhancements:      Limited examination due to motion artifact.  No acute intracranial abnormality or evidence of meningeal enhancement.  -mental status mildly improved 10/8, slightly worse 10/9, AAOx3 10/10, llethargic 10/11 however given haldol   -febrile overnight 10/8, redraw blood cx (NG), urine cx (NG),   CXR:Heart size is similar to previous exam, not enlarged.  There is calcification along the wall of the aorta.  No large volume of pleural fluid is present on this single view.  There are degenerative changes in the spine.  Lung fields are somewhat underinflated, with no focal consolidation present.    -escalated abx to vancomycin, zosyn. - stopped       Pancytopenia    Prominent periaortic and bilateral iliac and inguinal lymph nodes visualized.  Findings are concerning for lymphoma per CT ab/pel 9/24  Hgb 6.1, platelets 60, WBC 3.85  Anemia panel with low reticulocytes   B12, folate WNL   Stool negative for OCB 9/24  Transfuse 2 units PRBC 10/6, two MD signature needed for consent due to patient with AMS and no family contact available.   Consult Hem[facility]     10/8 Hgb 6.9 - 1 unit PRBC ordered   10/9 platelets 39. Will transfuse if < 20 or obvious signs of bleeding. Prednisone started per Hemoc recs. - concern for AOCD however Direct sheila +   10/10 Hgb 6.4, Platelets 63. Transfuse 1 unit PRBC   Appreciate Hemoc recs     10/11 suspected severe anemia of chronic disease. Direct antiglobulin test (10/8/22) was positive of unclear significance.  - minimize blood draws to minimize worsening of anemia  -may need bone marrow bx   Hgb 7  Platelets clumped, need to redraw   Appreciate hematology recs  - , haptoglobin pending     Refer to hemoc outpatient  Plan to taper prednisone by 10 mg weekly     Hypokalemia-resolved as of 10/12/2022    -3.3, replete     Hyponatremia-resolved as of 10/12/2022  ?beer potomania. Appears chronic do not suspect it's c/t his AMS  Mey and Osm  Serial Na while giving isotonic saline in case this is  siadh which I highly doubt    Na now 141     10/9 Na 151- start d10 gtt at 50 ml/hr, recheck na in 4 hours   10/10 fluids transitioned to D5 at 100 ml/hr yesterday evening, Na improving 149, continue to correct and monitor   10/11 Na 137 dextrose stopped          FRANK (acute kidney injury)-resolved as of 10/12/2022  Patient with acute kidney injury likely due to IVVD/dehydration   Order urine stuides   IVF  Repeat chem  Limit nephrotoxins   -increase IVF  -also likely s/t anemia   -improving Cr 0.9      Final Active Diagnoses:    Diagnosis Date Noted POA    Apnea [R06.81] 10/11/2022 No    Occlusion and stenosis of right vertebral artery [I65.01] 10/10/2022 Yes    Encephalopathy, metabolic [G93.41] 10/07/2022 Yes    UTI (urinary tract infection) [N39.0] 10/07/2022 Yes    AMS (altered mental status) [R41.82] 10/05/2022 Yes    Intravascular volume depletion [E86.1] 10/05/2022 Yes    Transaminitis [R74.01] 10/05/2022 Yes    Discharge planning issues [Z02.9] 10/05/2022 Not Applicable    Pancytopenia [D61.818] 09/24/2022 Yes      Problems Resolved During this Admission:    Diagnosis Date Noted Date Resolved POA    PRINCIPAL PROBLEM:  Syncope and collapse [R55] 10/05/2022 10/12/2022 Yes    Hypokalemia [E87.6] 10/11/2022 10/12/2022 No    FRANK (acute kidney injury) [N17.9] 10/05/2022 10/12/2022 Yes    Hyponatremia [E87.1] 10/05/2022 10/12/2022 Yes       Discharged Condition: stable    Disposition: Home or Self Care    Follow Up:   Follow-up Information     [facility] Follow up.    Specialty: Skilled Nursing Facility  Contact information:  [address] [place] [address] [phone]           Fransisco Raines MD Follow up.    Specialty: Hematology and Oncology  Contact information:  [address] [place] [address]  [phone]                       Patient Instructions:      Ambulatory referral/consult to Vascular Neurology   Standing Status: Future   Referral Priority:  Routine Referral Type: Consultation   Referral Reason: Specialty Services Required   Requested Specialty: Vascular Neurology   Number of Visits Requested: 1     Ambulatory referral/consult to [facility]   Standing Status: Future   Referral Priority: Routine Referral Type: Consultation   Referral Reason: Specialty Services Required   Number of Visits Requested: 1     Diet Dysphagia Mechanical Soft     Notify your health care provider if you experience any of the following:  temperature >100.4     Notify your health care provider if you experience any of the following:  persistent nausea and vomiting or diarrhea     Notify your health care provider if you experience any of the following:  redness, tenderness, or signs of infection (pain, swelling, redness, odor or green/yellow discharge around incision site)     Notify your health care provider if you experience any of the following:  difficulty breathing or increased cough     Notify your health care provider if you experience any of the following:  persistent dizziness, light-headedness, or visual disturbances     Notify your health care provider if you experience any of the following:  worsening rash     Notify your health care provider if you experience any of the following:  increased confusion or weakness     Activity as tolerated       Significant Diagnostic Studies: Labs: All labs within the past 24 hours have been reviewed    Pending Diagnostic Studies:     None         Medications:  Reconciled Home Medications:      Medication List      START taking these medications    famotidine 20 MG tablet  Commonly known as: PEPCID  Take 1 tablet (20 mg total) by mouth 2 (two) times daily.     folic acid 1 MG tablet  Commonly known as: FOLVITE  Take 1 tablet (1 mg total) by mouth once daily.     multivitamin Tab  Take 1 tablet by mouth once daily.     polyethylene glycol 17 gram/dose powder  Commonly known as: GLYCOLAX  mix 17 g ( 1 capful ) in 8 ounces of liquid  and drink  by mouth 3 (three) times daily as needed.     predniSONE 20 MG tablet  Commonly known as: DELTASONE  Take 2 tablets (40 mg total) by mouth once daily for 7 days, THEN 1.5 tablets (30 mg total) once daily for 7 days, THEN 1 tablet (20 mg total) once daily for 7 days, THEN 0.5 tablets (10 mg total) once daily for 7 days.  Start taking on: October 12, 2022     thiamine 100 MG tablet  Take 1 tablet (100 mg total) by mouth once daily.        STOP taking these medications    diclofenac sodium 1 % Gel  Commonly known as: VOLTAREN            Indwelling Lines/Drains at time of discharge:   Lines/Drains/Airways     None                 Time spent on the discharge of patient: 35 minutes         Saeed Reyes NP  Department of Hospital Medicine  [facility]

## 2023-01-16 PROBLEM — N39.0 UTI (URINARY TRACT INFECTION): Status: RESOLVED | Noted: 2022-10-07 | Resolved: 2023-01-16
